# Patient Record
Sex: MALE | Race: OTHER | HISPANIC OR LATINO | Employment: OTHER | URBAN - METROPOLITAN AREA
[De-identification: names, ages, dates, MRNs, and addresses within clinical notes are randomized per-mention and may not be internally consistent; named-entity substitution may affect disease eponyms.]

---

## 2017-03-07 ENCOUNTER — ALLSCRIPTS OFFICE VISIT (OUTPATIENT)
Dept: OTHER | Facility: OTHER | Age: 42
End: 2017-03-07

## 2017-03-07 DIAGNOSIS — M54.50 LOW BACK PAIN: ICD-10-CM

## 2017-03-07 DIAGNOSIS — M54.2 CERVICALGIA: ICD-10-CM

## 2017-03-08 ENCOUNTER — APPOINTMENT (OUTPATIENT)
Dept: PHYSICAL THERAPY | Facility: CLINIC | Age: 42
End: 2017-03-08
Payer: COMMERCIAL

## 2017-03-08 PROCEDURE — 97110 THERAPEUTIC EXERCISES: CPT

## 2017-03-08 PROCEDURE — 97162 PT EVAL MOD COMPLEX 30 MIN: CPT

## 2017-03-09 ENCOUNTER — APPOINTMENT (OUTPATIENT)
Dept: PHYSICAL THERAPY | Facility: CLINIC | Age: 42
End: 2017-03-09
Payer: COMMERCIAL

## 2017-03-09 PROCEDURE — 97140 MANUAL THERAPY 1/> REGIONS: CPT

## 2017-03-09 PROCEDURE — 97110 THERAPEUTIC EXERCISES: CPT

## 2017-03-13 ENCOUNTER — APPOINTMENT (OUTPATIENT)
Dept: PHYSICAL THERAPY | Facility: CLINIC | Age: 42
End: 2017-03-13
Payer: COMMERCIAL

## 2017-03-13 PROCEDURE — 97140 MANUAL THERAPY 1/> REGIONS: CPT

## 2017-03-13 PROCEDURE — 97110 THERAPEUTIC EXERCISES: CPT

## 2017-03-14 ENCOUNTER — APPOINTMENT (OUTPATIENT)
Dept: PHYSICAL THERAPY | Facility: CLINIC | Age: 42
End: 2017-03-14
Payer: COMMERCIAL

## 2017-03-15 ENCOUNTER — APPOINTMENT (OUTPATIENT)
Dept: PHYSICAL THERAPY | Facility: CLINIC | Age: 42
End: 2017-03-15
Payer: COMMERCIAL

## 2017-03-16 ENCOUNTER — APPOINTMENT (OUTPATIENT)
Dept: PHYSICAL THERAPY | Facility: CLINIC | Age: 42
End: 2017-03-16
Payer: COMMERCIAL

## 2017-03-16 PROCEDURE — 97110 THERAPEUTIC EXERCISES: CPT

## 2017-03-20 ENCOUNTER — APPOINTMENT (OUTPATIENT)
Dept: PHYSICAL THERAPY | Facility: CLINIC | Age: 42
End: 2017-03-20
Payer: COMMERCIAL

## 2017-03-20 PROCEDURE — 97110 THERAPEUTIC EXERCISES: CPT

## 2017-03-20 PROCEDURE — 97140 MANUAL THERAPY 1/> REGIONS: CPT

## 2017-03-23 ENCOUNTER — APPOINTMENT (OUTPATIENT)
Dept: PHYSICAL THERAPY | Facility: CLINIC | Age: 42
End: 2017-03-23
Payer: COMMERCIAL

## 2017-03-23 PROCEDURE — 97110 THERAPEUTIC EXERCISES: CPT

## 2017-03-23 PROCEDURE — 97140 MANUAL THERAPY 1/> REGIONS: CPT

## 2017-03-27 ENCOUNTER — APPOINTMENT (OUTPATIENT)
Dept: PHYSICAL THERAPY | Facility: CLINIC | Age: 42
End: 2017-03-27
Payer: COMMERCIAL

## 2017-03-27 PROCEDURE — 97140 MANUAL THERAPY 1/> REGIONS: CPT

## 2017-03-27 PROCEDURE — 97110 THERAPEUTIC EXERCISES: CPT

## 2017-03-30 ENCOUNTER — APPOINTMENT (OUTPATIENT)
Dept: PHYSICAL THERAPY | Facility: CLINIC | Age: 42
End: 2017-03-30
Payer: COMMERCIAL

## 2017-03-30 PROCEDURE — 97110 THERAPEUTIC EXERCISES: CPT

## 2017-03-31 ENCOUNTER — ALLSCRIPTS OFFICE VISIT (OUTPATIENT)
Dept: OTHER | Facility: OTHER | Age: 42
End: 2017-03-31

## 2017-03-31 ENCOUNTER — TRANSCRIBE ORDERS (OUTPATIENT)
Dept: ADMINISTRATIVE | Facility: HOSPITAL | Age: 42
End: 2017-03-31

## 2017-03-31 DIAGNOSIS — M54.40 ACUTE RIGHT-SIDED LOW BACK PAIN WITH SCIATICA, SCIATICA LATERALITY UNSPECIFIED: ICD-10-CM

## 2017-03-31 DIAGNOSIS — M54.16 LUMBAR RADICULOPATHY: Primary | ICD-10-CM

## 2017-04-03 ENCOUNTER — GENERIC CONVERSION - ENCOUNTER (OUTPATIENT)
Dept: OTHER | Facility: OTHER | Age: 42
End: 2017-04-03

## 2017-04-04 ENCOUNTER — APPOINTMENT (OUTPATIENT)
Dept: PHYSICAL THERAPY | Facility: CLINIC | Age: 42
End: 2017-04-04
Payer: COMMERCIAL

## 2017-04-04 PROCEDURE — 97110 THERAPEUTIC EXERCISES: CPT

## 2017-04-05 ENCOUNTER — GENERIC CONVERSION - ENCOUNTER (OUTPATIENT)
Dept: OTHER | Facility: OTHER | Age: 42
End: 2017-04-05

## 2017-04-07 ENCOUNTER — APPOINTMENT (OUTPATIENT)
Dept: PHYSICAL THERAPY | Facility: CLINIC | Age: 42
End: 2017-04-07
Payer: COMMERCIAL

## 2017-04-07 PROCEDURE — 97110 THERAPEUTIC EXERCISES: CPT

## 2017-04-07 PROCEDURE — 97140 MANUAL THERAPY 1/> REGIONS: CPT

## 2017-04-10 ENCOUNTER — ALLSCRIPTS OFFICE VISIT (OUTPATIENT)
Dept: OTHER | Facility: OTHER | Age: 42
End: 2017-04-10

## 2017-04-12 ENCOUNTER — APPOINTMENT (OUTPATIENT)
Dept: PHYSICAL THERAPY | Facility: CLINIC | Age: 42
End: 2017-04-12
Payer: COMMERCIAL

## 2017-04-12 ENCOUNTER — GENERIC CONVERSION - ENCOUNTER (OUTPATIENT)
Dept: OTHER | Facility: OTHER | Age: 42
End: 2017-04-12

## 2017-04-12 PROCEDURE — 97140 MANUAL THERAPY 1/> REGIONS: CPT

## 2017-04-12 PROCEDURE — 97110 THERAPEUTIC EXERCISES: CPT

## 2017-04-13 ENCOUNTER — APPOINTMENT (OUTPATIENT)
Dept: PHYSICAL THERAPY | Facility: CLINIC | Age: 42
End: 2017-04-13
Payer: COMMERCIAL

## 2017-04-13 DIAGNOSIS — M54.50 LOW BACK PAIN: ICD-10-CM

## 2017-04-13 PROCEDURE — 97140 MANUAL THERAPY 1/> REGIONS: CPT

## 2017-04-13 PROCEDURE — 97110 THERAPEUTIC EXERCISES: CPT

## 2017-04-18 ENCOUNTER — APPOINTMENT (OUTPATIENT)
Dept: PHYSICAL THERAPY | Facility: CLINIC | Age: 42
End: 2017-04-18
Payer: COMMERCIAL

## 2017-04-18 PROCEDURE — 97110 THERAPEUTIC EXERCISES: CPT

## 2017-04-18 PROCEDURE — 97140 MANUAL THERAPY 1/> REGIONS: CPT

## 2017-04-20 ENCOUNTER — APPOINTMENT (OUTPATIENT)
Dept: PHYSICAL THERAPY | Facility: CLINIC | Age: 42
End: 2017-04-20
Payer: COMMERCIAL

## 2017-04-20 PROCEDURE — 97110 THERAPEUTIC EXERCISES: CPT

## 2017-04-20 PROCEDURE — 97140 MANUAL THERAPY 1/> REGIONS: CPT

## 2017-04-25 ENCOUNTER — APPOINTMENT (OUTPATIENT)
Dept: PHYSICAL THERAPY | Facility: CLINIC | Age: 42
End: 2017-04-25
Payer: COMMERCIAL

## 2017-04-25 PROCEDURE — 97110 THERAPEUTIC EXERCISES: CPT

## 2017-04-25 PROCEDURE — 97140 MANUAL THERAPY 1/> REGIONS: CPT

## 2017-04-27 ENCOUNTER — APPOINTMENT (OUTPATIENT)
Dept: PHYSICAL THERAPY | Facility: CLINIC | Age: 42
End: 2017-04-27
Payer: COMMERCIAL

## 2017-04-27 PROCEDURE — 97110 THERAPEUTIC EXERCISES: CPT

## 2017-04-30 ENCOUNTER — HOSPITAL ENCOUNTER (OUTPATIENT)
Dept: MRI IMAGING | Facility: HOSPITAL | Age: 42
Discharge: HOME | End: 2017-04-30
Payer: COMMERCIAL

## 2017-04-30 DIAGNOSIS — M54.50 LOW BACK PAIN: ICD-10-CM

## 2017-04-30 PROCEDURE — 72148 MRI LUMBAR SPINE W/O DYE: CPT

## 2017-05-01 ENCOUNTER — ANESTHESIA EVENT (OUTPATIENT)
Dept: GASTROENTEROLOGY | Facility: HOSPITAL | Age: 42
End: 2017-05-01
Payer: COMMERCIAL

## 2017-05-01 ENCOUNTER — GENERIC CONVERSION - ENCOUNTER (OUTPATIENT)
Dept: OTHER | Facility: OTHER | Age: 42
End: 2017-05-01

## 2017-05-01 RX ORDER — NAPROXEN 500 MG/1
500 TABLET ORAL 2 TIMES DAILY WITH MEALS
COMMUNITY
End: 2018-04-25 | Stop reason: ALTCHOICE

## 2017-05-01 RX ORDER — CYCLOBENZAPRINE HCL 10 MG
5 TABLET ORAL 3 TIMES DAILY PRN
COMMUNITY
End: 2018-03-05

## 2017-05-02 ENCOUNTER — GENERIC CONVERSION - ENCOUNTER (OUTPATIENT)
Dept: PERIOP | Facility: HOSPITAL | Age: 42
End: 2017-05-02

## 2017-05-02 ENCOUNTER — ANESTHESIA (OUTPATIENT)
Dept: GASTROENTEROLOGY | Facility: HOSPITAL | Age: 42
End: 2017-05-02
Payer: COMMERCIAL

## 2017-05-02 ENCOUNTER — HOSPITAL ENCOUNTER (OUTPATIENT)
Facility: HOSPITAL | Age: 42
Setting detail: OUTPATIENT SURGERY
Discharge: HOME | End: 2017-05-02
Attending: SURGERY | Admitting: SURGERY
Payer: COMMERCIAL

## 2017-05-02 VITALS
SYSTOLIC BLOOD PRESSURE: 107 MMHG | WEIGHT: 135 LBS | HEIGHT: 69 IN | DIASTOLIC BLOOD PRESSURE: 67 MMHG | TEMPERATURE: 97.8 F | BODY MASS INDEX: 19.99 KG/M2 | HEART RATE: 52 BPM | RESPIRATION RATE: 20 BRPM | OXYGEN SATURATION: 100 %

## 2017-05-02 DIAGNOSIS — K62.5 HEMORRHAGE OF ANUS AND RECTUM: ICD-10-CM

## 2017-05-02 PROCEDURE — 88305 TISSUE EXAM BY PATHOLOGIST: CPT | Performed by: SURGERY

## 2017-05-02 RX ORDER — SODIUM CHLORIDE 9 MG/ML
125 INJECTION, SOLUTION INTRAVENOUS CONTINUOUS
Status: DISCONTINUED | OUTPATIENT
Start: 2017-05-02 | End: 2017-05-02 | Stop reason: HOSPADM

## 2017-05-02 RX ORDER — PROPOFOL 10 MG/ML
INJECTION, EMULSION INTRAVENOUS AS NEEDED
Status: DISCONTINUED | OUTPATIENT
Start: 2017-05-02 | End: 2017-05-02 | Stop reason: SURG

## 2017-05-02 RX ORDER — ONDANSETRON 2 MG/ML
4 INJECTION INTRAMUSCULAR; INTRAVENOUS EVERY 6 HOURS PRN
Status: DISCONTINUED | OUTPATIENT
Start: 2017-05-02 | End: 2017-05-02 | Stop reason: HOSPADM

## 2017-05-02 RX ORDER — LIDOCAINE HYDROCHLORIDE 10 MG/ML
INJECTION, SOLUTION INFILTRATION; PERINEURAL AS NEEDED
Status: DISCONTINUED | OUTPATIENT
Start: 2017-05-02 | End: 2017-05-02 | Stop reason: SURG

## 2017-05-02 RX ADMIN — PROPOFOL 50 MG: 10 INJECTION, EMULSION INTRAVENOUS at 10:25

## 2017-05-02 RX ADMIN — PROPOFOL 120 MG: 10 INJECTION, EMULSION INTRAVENOUS at 10:22

## 2017-05-02 RX ADMIN — PROPOFOL 30 MG: 10 INJECTION, EMULSION INTRAVENOUS at 10:27

## 2017-05-02 RX ADMIN — PROPOFOL 50 MG: 10 INJECTION, EMULSION INTRAVENOUS at 10:32

## 2017-05-02 RX ADMIN — PROPOFOL 50 MG: 10 INJECTION, EMULSION INTRAVENOUS at 10:30

## 2017-05-02 RX ADMIN — SODIUM CHLORIDE 125 ML/HR: 0.9 INJECTION, SOLUTION INTRAVENOUS at 09:53

## 2017-05-02 RX ADMIN — LIDOCAINE HYDROCHLORIDE 25 MG: 10 INJECTION, SOLUTION INFILTRATION; PERINEURAL at 10:22

## 2017-05-03 ENCOUNTER — APPOINTMENT (OUTPATIENT)
Dept: PHYSICAL THERAPY | Facility: CLINIC | Age: 42
End: 2017-05-03
Payer: COMMERCIAL

## 2017-05-03 PROCEDURE — 97110 THERAPEUTIC EXERCISES: CPT

## 2017-05-05 ENCOUNTER — GENERIC CONVERSION - ENCOUNTER (OUTPATIENT)
Dept: OTHER | Facility: OTHER | Age: 42
End: 2017-05-05

## 2017-05-05 ENCOUNTER — APPOINTMENT (OUTPATIENT)
Dept: PHYSICAL THERAPY | Facility: CLINIC | Age: 42
End: 2017-05-05
Payer: COMMERCIAL

## 2017-05-05 PROCEDURE — 97164 PT RE-EVAL EST PLAN CARE: CPT

## 2017-05-05 PROCEDURE — 97110 THERAPEUTIC EXERCISES: CPT

## 2017-05-08 ENCOUNTER — APPOINTMENT (OUTPATIENT)
Dept: PHYSICAL THERAPY | Facility: CLINIC | Age: 42
End: 2017-05-08
Payer: COMMERCIAL

## 2017-05-08 PROCEDURE — 97110 THERAPEUTIC EXERCISES: CPT

## 2017-05-08 PROCEDURE — 97140 MANUAL THERAPY 1/> REGIONS: CPT

## 2017-05-11 ENCOUNTER — APPOINTMENT (OUTPATIENT)
Dept: PHYSICAL THERAPY | Facility: CLINIC | Age: 42
End: 2017-05-11
Payer: COMMERCIAL

## 2017-05-11 PROCEDURE — 97110 THERAPEUTIC EXERCISES: CPT

## 2017-05-11 PROCEDURE — 97140 MANUAL THERAPY 1/> REGIONS: CPT

## 2017-05-15 ENCOUNTER — APPOINTMENT (OUTPATIENT)
Dept: PHYSICAL THERAPY | Facility: CLINIC | Age: 42
End: 2017-05-15
Payer: COMMERCIAL

## 2017-05-18 ENCOUNTER — APPOINTMENT (OUTPATIENT)
Dept: PHYSICAL THERAPY | Facility: CLINIC | Age: 42
End: 2017-05-18
Payer: COMMERCIAL

## 2017-05-18 PROCEDURE — 97140 MANUAL THERAPY 1/> REGIONS: CPT

## 2017-05-18 PROCEDURE — 97110 THERAPEUTIC EXERCISES: CPT

## 2017-05-22 ENCOUNTER — APPOINTMENT (OUTPATIENT)
Dept: PHYSICAL THERAPY | Facility: CLINIC | Age: 42
End: 2017-05-22
Payer: COMMERCIAL

## 2017-05-22 PROCEDURE — 97110 THERAPEUTIC EXERCISES: CPT

## 2017-05-25 ENCOUNTER — APPOINTMENT (OUTPATIENT)
Dept: PHYSICAL THERAPY | Facility: CLINIC | Age: 42
End: 2017-05-25
Payer: COMMERCIAL

## 2017-05-25 PROCEDURE — 97110 THERAPEUTIC EXERCISES: CPT

## 2017-05-25 PROCEDURE — 97140 MANUAL THERAPY 1/> REGIONS: CPT

## 2017-05-30 ENCOUNTER — GENERIC CONVERSION - ENCOUNTER (OUTPATIENT)
Dept: OTHER | Facility: OTHER | Age: 42
End: 2017-05-30

## 2017-05-30 ENCOUNTER — APPOINTMENT (OUTPATIENT)
Dept: PHYSICAL THERAPY | Facility: CLINIC | Age: 42
End: 2017-05-30
Payer: COMMERCIAL

## 2017-05-30 PROCEDURE — 97140 MANUAL THERAPY 1/> REGIONS: CPT

## 2017-05-30 PROCEDURE — 97110 THERAPEUTIC EXERCISES: CPT

## 2017-06-01 ENCOUNTER — ALLSCRIPTS OFFICE VISIT (OUTPATIENT)
Dept: OTHER | Facility: OTHER | Age: 42
End: 2017-06-01

## 2017-06-01 DIAGNOSIS — M54.2 CERVICALGIA: ICD-10-CM

## 2017-06-05 ENCOUNTER — TRANSCRIBE ORDERS (OUTPATIENT)
Dept: ADMINISTRATIVE | Facility: HOSPITAL | Age: 42
End: 2017-06-05

## 2017-06-05 DIAGNOSIS — M54.2 CERVICAL PAIN: Primary | ICD-10-CM

## 2017-06-09 ENCOUNTER — HOSPITAL ENCOUNTER (OUTPATIENT)
Dept: MRI IMAGING | Facility: HOSPITAL | Age: 42
Discharge: HOME | End: 2017-06-09
Payer: COMMERCIAL

## 2017-06-09 DIAGNOSIS — M54.2 CERVICAL PAIN: ICD-10-CM

## 2017-06-09 PROCEDURE — 72141 MRI NECK SPINE W/O DYE: CPT

## 2017-06-12 ENCOUNTER — GENERIC CONVERSION - ENCOUNTER (OUTPATIENT)
Dept: OTHER | Facility: OTHER | Age: 42
End: 2017-06-12

## 2017-07-14 ENCOUNTER — ALLSCRIPTS OFFICE VISIT (OUTPATIENT)
Dept: OTHER | Facility: OTHER | Age: 42
End: 2017-07-14

## 2017-07-19 ENCOUNTER — ALLSCRIPTS OFFICE VISIT (OUTPATIENT)
Dept: RADIOLOGY | Facility: MEDICAL CENTER | Age: 42
End: 2017-07-19
Attending: DENTIST
Payer: COMMERCIAL

## 2017-07-25 ENCOUNTER — ALLSCRIPTS OFFICE VISIT (OUTPATIENT)
Dept: OTHER | Facility: OTHER | Age: 42
End: 2017-07-25

## 2017-07-31 ENCOUNTER — GENERIC CONVERSION - ENCOUNTER (OUTPATIENT)
Dept: OTHER | Facility: OTHER | Age: 42
End: 2017-07-31

## 2017-09-11 DIAGNOSIS — R93.7 ABNORMAL FINDINGS ON DIAGNOSTIC IMAGING OF OTHER PARTS OF MUSCULOSKELETAL SYSTEM: ICD-10-CM

## 2017-09-18 ENCOUNTER — GENERIC CONVERSION - ENCOUNTER (OUTPATIENT)
Dept: OTHER | Facility: OTHER | Age: 42
End: 2017-09-18

## 2017-09-18 DIAGNOSIS — M54.2 CERVICALGIA: ICD-10-CM

## 2017-09-18 DIAGNOSIS — M47.812 SPONDYLOSIS OF CERVICAL REGION WITHOUT MYELOPATHY OR RADICULOPATHY: ICD-10-CM

## 2017-09-20 ENCOUNTER — GENERIC CONVERSION - ENCOUNTER (OUTPATIENT)
Dept: OTHER | Facility: OTHER | Age: 42
End: 2017-09-20

## 2017-09-28 ENCOUNTER — APPOINTMENT (OUTPATIENT)
Dept: PHYSICAL THERAPY | Facility: CLINIC | Age: 42
End: 2017-09-28
Payer: COMMERCIAL

## 2017-09-28 ENCOUNTER — GENERIC CONVERSION - ENCOUNTER (OUTPATIENT)
Dept: OTHER | Facility: OTHER | Age: 42
End: 2017-09-28

## 2017-09-28 DIAGNOSIS — M47.812 SPONDYLOSIS OF CERVICAL REGION WITHOUT MYELOPATHY OR RADICULOPATHY: ICD-10-CM

## 2017-09-28 DIAGNOSIS — M54.2 CERVICALGIA: ICD-10-CM

## 2017-09-28 PROCEDURE — G8991 OTHER PT/OT GOAL STATUS: HCPCS

## 2017-09-28 PROCEDURE — G8990 OTHER PT/OT CURRENT STATUS: HCPCS

## 2017-09-28 PROCEDURE — 97162 PT EVAL MOD COMPLEX 30 MIN: CPT

## 2017-10-03 ENCOUNTER — APPOINTMENT (OUTPATIENT)
Dept: PHYSICAL THERAPY | Facility: CLINIC | Age: 42
End: 2017-10-03
Payer: COMMERCIAL

## 2017-10-03 PROCEDURE — 97140 MANUAL THERAPY 1/> REGIONS: CPT

## 2017-10-03 PROCEDURE — 97110 THERAPEUTIC EXERCISES: CPT

## 2017-10-05 ENCOUNTER — APPOINTMENT (OUTPATIENT)
Dept: PHYSICAL THERAPY | Facility: CLINIC | Age: 42
End: 2017-10-05
Payer: COMMERCIAL

## 2017-10-05 PROCEDURE — 97110 THERAPEUTIC EXERCISES: CPT

## 2017-10-05 PROCEDURE — 97140 MANUAL THERAPY 1/> REGIONS: CPT

## 2017-10-06 ENCOUNTER — GENERIC CONVERSION - ENCOUNTER (OUTPATIENT)
Dept: OTHER | Facility: OTHER | Age: 42
End: 2017-10-06

## 2017-10-10 ENCOUNTER — APPOINTMENT (OUTPATIENT)
Dept: PHYSICAL THERAPY | Facility: CLINIC | Age: 42
End: 2017-10-10
Payer: COMMERCIAL

## 2017-10-10 PROCEDURE — 97140 MANUAL THERAPY 1/> REGIONS: CPT

## 2017-10-10 PROCEDURE — 97110 THERAPEUTIC EXERCISES: CPT

## 2017-10-12 ENCOUNTER — APPOINTMENT (OUTPATIENT)
Dept: PHYSICAL THERAPY | Facility: CLINIC | Age: 42
End: 2017-10-12
Payer: COMMERCIAL

## 2017-10-12 PROCEDURE — 97112 NEUROMUSCULAR REEDUCATION: CPT

## 2017-10-12 PROCEDURE — 97140 MANUAL THERAPY 1/> REGIONS: CPT

## 2017-10-12 PROCEDURE — 97110 THERAPEUTIC EXERCISES: CPT

## 2017-10-17 ENCOUNTER — APPOINTMENT (OUTPATIENT)
Dept: PHYSICAL THERAPY | Facility: CLINIC | Age: 42
End: 2017-10-17
Payer: COMMERCIAL

## 2017-10-19 ENCOUNTER — APPOINTMENT (OUTPATIENT)
Dept: PHYSICAL THERAPY | Facility: CLINIC | Age: 42
End: 2017-10-19
Payer: COMMERCIAL

## 2017-10-19 PROCEDURE — 97140 MANUAL THERAPY 1/> REGIONS: CPT

## 2017-10-19 PROCEDURE — 97110 THERAPEUTIC EXERCISES: CPT

## 2017-10-24 ENCOUNTER — APPOINTMENT (OUTPATIENT)
Dept: PHYSICAL THERAPY | Facility: CLINIC | Age: 42
End: 2017-10-24
Payer: COMMERCIAL

## 2017-10-24 PROCEDURE — 97110 THERAPEUTIC EXERCISES: CPT

## 2017-10-24 PROCEDURE — 97140 MANUAL THERAPY 1/> REGIONS: CPT

## 2017-10-26 ENCOUNTER — APPOINTMENT (OUTPATIENT)
Dept: PHYSICAL THERAPY | Facility: CLINIC | Age: 42
End: 2017-10-26
Payer: COMMERCIAL

## 2017-10-26 PROCEDURE — 97140 MANUAL THERAPY 1/> REGIONS: CPT

## 2017-10-26 PROCEDURE — 97110 THERAPEUTIC EXERCISES: CPT

## 2017-10-30 ENCOUNTER — GENERIC CONVERSION - ENCOUNTER (OUTPATIENT)
Dept: FAMILY MEDICINE CLINIC | Facility: CLINIC | Age: 42
End: 2017-10-30

## 2017-10-31 ENCOUNTER — GENERIC CONVERSION - ENCOUNTER (OUTPATIENT)
Dept: OTHER | Facility: OTHER | Age: 42
End: 2017-10-31

## 2017-10-31 ENCOUNTER — APPOINTMENT (OUTPATIENT)
Dept: PHYSICAL THERAPY | Facility: CLINIC | Age: 42
End: 2017-10-31
Payer: COMMERCIAL

## 2017-10-31 PROCEDURE — G8991 OTHER PT/OT GOAL STATUS: HCPCS

## 2017-10-31 PROCEDURE — G8990 OTHER PT/OT CURRENT STATUS: HCPCS

## 2017-10-31 PROCEDURE — 97140 MANUAL THERAPY 1/> REGIONS: CPT

## 2017-11-02 ENCOUNTER — APPOINTMENT (OUTPATIENT)
Dept: PHYSICAL THERAPY | Facility: CLINIC | Age: 42
End: 2017-11-02
Payer: COMMERCIAL

## 2017-11-02 PROCEDURE — 97110 THERAPEUTIC EXERCISES: CPT

## 2017-11-02 PROCEDURE — 97140 MANUAL THERAPY 1/> REGIONS: CPT

## 2017-11-02 PROCEDURE — 97530 THERAPEUTIC ACTIVITIES: CPT

## 2017-11-07 ENCOUNTER — APPOINTMENT (OUTPATIENT)
Dept: PHYSICAL THERAPY | Facility: CLINIC | Age: 42
End: 2017-11-07
Payer: COMMERCIAL

## 2017-11-07 PROCEDURE — 97140 MANUAL THERAPY 1/> REGIONS: CPT

## 2017-11-07 PROCEDURE — 97530 THERAPEUTIC ACTIVITIES: CPT

## 2017-11-07 PROCEDURE — 97110 THERAPEUTIC EXERCISES: CPT

## 2017-11-09 ENCOUNTER — APPOINTMENT (OUTPATIENT)
Dept: PHYSICAL THERAPY | Facility: CLINIC | Age: 42
End: 2017-11-09
Payer: COMMERCIAL

## 2017-11-09 PROCEDURE — 97110 THERAPEUTIC EXERCISES: CPT

## 2017-11-09 PROCEDURE — 97530 THERAPEUTIC ACTIVITIES: CPT

## 2017-11-09 PROCEDURE — 97140 MANUAL THERAPY 1/> REGIONS: CPT

## 2017-11-13 ENCOUNTER — ALLSCRIPTS OFFICE VISIT (OUTPATIENT)
Dept: OTHER | Facility: OTHER | Age: 42
End: 2017-11-13

## 2017-11-14 ENCOUNTER — APPOINTMENT (OUTPATIENT)
Dept: PHYSICAL THERAPY | Facility: CLINIC | Age: 42
End: 2017-11-14
Payer: COMMERCIAL

## 2017-11-14 PROCEDURE — 97140 MANUAL THERAPY 1/> REGIONS: CPT

## 2017-11-14 PROCEDURE — 97110 THERAPEUTIC EXERCISES: CPT

## 2017-11-14 PROCEDURE — 97530 THERAPEUTIC ACTIVITIES: CPT

## 2017-11-14 NOTE — PROGRESS NOTES
Assessment  1  Cervical myofascial pain syndrome (729 1) (M79 1)   2  Cervical spondylosis (721 0) (M47 812)   3  Chronic neck pain (723 1,338 29) (M54 2,G89 29)    Plan   Cervical myofascial pain syndrome, Myofascial pain syndrome    · Chlorzoxazone 500 MG Oral Tablet; TAKE 1 5 TABLET 3 TIMES AS NEEDED   Rx By: Colton Gualpla; Dispense: 30 Days ; #:90 Tablet; Refill: 1;Cervical myofascial pain syndrome, Myofascial pain syndrome; JAE = N; Verified Transmission to [facility] [address]; Last Updated By: System, SureScript[employer]; 11/13/2017 10:50:34 AM  Follow-up visit in 2 months Evaluation and Treatment  Follow-up with [facility] for med refills  Status: Hold For - Scheduling  Requested for: [id number] Ordered; For: Cervical myofascial pain syndrome;  Ordered By: Colton Guallpa  Performed:   Due: [id number]   Discussion/Summary    The patient presents today for a follow-up office visit  The patient is currently being treated for cervical spondylosis, chronic left-sided neck pain  The patient has been participating in physical therapy which she feels is helpful while he is there and for short time after, however his pain ends up returning  He does use an at home TENS unit  He has also been using cyclobenzaprine  Patient tells me that he has a neurology appointment scheduled for December 13th at [facility]   this time, patient should continue with his physical therapy program and at home TENS unit   will switch the patient's muscle relaxer to chlorzoxazone 1 5 tablets 3 times per day  will provide the patient with samples of Pennsaid ointment since he was not able to obtain diclofenac gel after his last appointment  If he feels this medication is helpful he will call the office  PDMP was reviewed today and was appropriatewill see the patient back in 8 weeks  Patient is able to Self-Care  The treatment plan was reviewed with the patient/guardian   The patient/guardian understands and agrees with the treatment plan      Chief Complaint    1  Neck Pain  Left-sided neck and trapezius pain      History of Present Illness  The patient presents today for a follow-up office visit  The patient is currently being treated for cervical spondylosis, chronic left-sided neck pain  The patient has been participating in physical therapy which she feels is helpful while he is there and for short time after, however his pain ends up returning  He does use an at home TENS unit  He has also been using cyclobenzaprine  Patient tells me that he has a neurology appointment scheduled for December 13th at [facility]   the patient rates his pain 4/10, this is constant in nature most bothersome in the morning and at night  He describes his pain as burning, sharp, and pressure-like  He localizes his pain to the neck on the left side in into the left arm   have personally reviewed and/or updated the patient's past medical history, past surgical history, family history, social history, current medications, allergies, and vital signs today  Rachel Jesus presents with complaints of constant episodes of bilateral posterior neck pain, described as sharp and burning, radiating to the left shoulder  On a scale of 1 to 10, the patient rates the pain as 4  Symptoms are unchanged  Review of Systems   Constitutional: no fever,-- no recent weight gain-- and-- no recent weight loss  Eyes: no double vision-- and-- no blurry vision  Cardiovascular: no chest pain,-- no palpitations-- and-- no lower extremity edema  Respiratory: no complaints of shortness of breath-- and-- no wheezing  Musculoskeletal: muscle weakness,-- joint stiffness-- and-- decreased range of motion, but-- no difficulty walking,-- no joint swelling,-- no limb swelling-- and-- no pain in extremity  Neurological: dizziness, but-- no difficulty swallowing,-- no memory loss,-- no loss of consciousness-- and-- no seizures    Gastrointestinal: no nausea,-- no vomiting,-- no constipation-- and-- no diarrhea  Genitourinary: no difficulty initiating urine stream,-- no genital pain-- and-- no frequent urination  Integumentary: no complaints of skin rash  Psychiatric: no depression  Endocrine: no excessive thirst,-- no adrenal disease,-- no hypothyroidism-- and-- no hyperthyroidism  Hematologic/Lymphatic: no tendency for easy bruising-- and-- no tendency for easy bleeding  Active Problems  1  Abnormal magnetic resonance imaging of cervical spine (793 7) (R93 7)   2  Cervical spondylosis (721 0) (M47 812)   3  Chronic low back pain (724 2,338 29) (M54 5,G89 29)   4  Chronic neck pain (723 1,338 29) (M54 2,G89 29)   5  Depression screening (V79 0) (Z13 89)   6  Diverticulosis (562 10) (K57 90)   7  Hemorrhoids (455 6) (K64 9)   8  History of colonic polyps (V12 72) (Z86 010)   9  Neck pain on left side (723 1) (M54 2)   10  Need for influenza vaccination (V04 81) (Z23)   11  Paresthesias (782 0) (R20 2)   12  History of Rectal bleeding (569 3) (K62 5)   13  Right lumbar radiculopathy (724 4) (M54 16)    Past Medical History  1  History of depression (V11 8) (Z86 59)   2  History of Rectal bleeding (569 3) (K62 5)    Surgical History  1  History of Knee Arthroscopy (Therapeutic)    Family History  Mother    1  Family history of diabetes mellitus (V18 0) (Z83 3)   2  Family history of hypertension (V17 49) (Z82 49)   3  Family history of lung cancer (V16 1) (Z80 1)  Father    4  No pertinent family history    Social History   · Currently unemployed and looking for work (V62 0) (Z56 0)   · History of drug use (305 93) (Z87 898)   · Never a smoker   · No alcohol use   · No preference on Denominational beliefs   · Single, in a monogamous relationship   · Uses marijuana (305 20) (F12 90)    Allergies  1   No Known Drug Allergies    Vitals  Vital Signs    Recorded: 54RBH0597 15:26VG   Systolic 015   Diastolic 70   Weight 218 lb    BMI Calculated 19 64   BSA Calculated 1 74   Pain Scale 4 Physical Exam   Constitutional  General appearance: Well developed, well nourished, alert, in no distress, non-toxic and no overt pain behavior  Eyes  Sclera: anicteric  HEENT  Hearing grossly intact  Pulmonary  Respiratory effort: Even and unlabored  Psychiatric  Mood and affect: Mood and affect appropriate  Neurologic  Cranial nerves: Cranial nerves II-XII grossly intact  Musculoskeletal  Gait and station: Normal    Cervical Spine examination demonstrates Cervical Spine:  Appearance: Forward head carry  Tenderness: left paraspinal tenderness-- and-- left trapezius muscle  Flexion was painful  Extension was painful  Left lateral flexion was painful  Right lateral flexion was painful  Rotation to the left was painful  Rotation to the right was painful   hand strength was normal bilaterally  wrist strength was normal bilaterally  elbow strength was normal bilaterally  shoulder strength was normal bilaterally  Results/Data  * MRI CERVICAL SPINE WO CONTRAST [id number] 10:27PM Tia Goode     Test Name Result Flag Reference   [address]INE [address] (Report)       MRI CERVICAL SPINE WITHOUT CONTRAST   INDICATION: 45-year-old male, posterior lower neck pain  COMPARISON: None  TECHNIQUE: Sagittal T1, sagittal T2, sagittal inversion recovery, axial T2, axial 2D merge      IMAGE QUALITY: Diagnostic   FINDINGS:   ALIGNMENT: Normal alignment of the cervical spine  No compression fracture  No subluxation  No scoliosis  MARROW SIGNAL: Normal marrow signal is identified within the visualized bony structures  No discrete marrow lesion  CERVICAL AND VISUALIZED THORACIC CORD: Minimally prominent central canal of the spinal cord extends from approximately C3-C7, likely developmental  The degree of prominence is not sufficient to be considered typical syrinx   3 month MRI follow-up   recommended performed without and with IV contrast    PREVERTEBRAL AND PARASPINAL SOFT TISSUES: Prevertebral and paraspinal soft tissues are unremarkable     VISUALIZED POSTERIOR FOSSA: The visualized posterior fossa demonstrates no abnormal signal    CERVICAL DISC SPACES:      C2-C3: Normal    C3-C4: Normal    C4-C5: Minimal degenerative disc and facet disease, no stenosis   C5-C6: Minimal degenerative disc and facet disease, no stenosis   C6-C7: Minimal degenerative disc and facet disease, no stenosis   C7-T1: Normal    UPPER THORACIC DISC SPACES: Normal     IMPRESSION:  Minimal multilevel degenerative spondylosis   No evidence of disc herniation, central canal or foraminal stenosis   Mild multilevel prominence of the central canal of the cervical spinal cord, likely developmental  MRI follow-up recommended as discussed above    ##sigslh##sigslh    Workstation performed: [id number]   Signed by:  Jimi Caballero MD  6/12/17     Future Appointments    Date/Time Provider Specialty Site   01/08/2018 11:15 AM [facility] 15       Signatures   Electronically signed by : Roseann Leon; Nov 13 2017 10:53AM EST                       (Author)    Electronically signed by : Sean Escalante DO; Nov 13 2017 12:52PM EST

## 2017-11-16 ENCOUNTER — APPOINTMENT (OUTPATIENT)
Dept: PHYSICAL THERAPY | Facility: CLINIC | Age: 42
End: 2017-11-16
Payer: COMMERCIAL

## 2017-11-21 ENCOUNTER — APPOINTMENT (OUTPATIENT)
Dept: PHYSICAL THERAPY | Facility: CLINIC | Age: 42
End: 2017-11-21
Payer: COMMERCIAL

## 2017-11-21 PROCEDURE — 97140 MANUAL THERAPY 1/> REGIONS: CPT

## 2017-11-21 PROCEDURE — 97110 THERAPEUTIC EXERCISES: CPT

## 2017-11-24 ENCOUNTER — APPOINTMENT (OUTPATIENT)
Dept: PHYSICAL THERAPY | Facility: CLINIC | Age: 42
End: 2017-11-24
Payer: COMMERCIAL

## 2017-11-24 PROCEDURE — 97110 THERAPEUTIC EXERCISES: CPT

## 2017-11-24 PROCEDURE — 97530 THERAPEUTIC ACTIVITIES: CPT

## 2017-11-24 PROCEDURE — 97140 MANUAL THERAPY 1/> REGIONS: CPT

## 2017-11-28 ENCOUNTER — APPOINTMENT (OUTPATIENT)
Dept: PHYSICAL THERAPY | Facility: CLINIC | Age: 42
End: 2017-11-28
Payer: COMMERCIAL

## 2017-11-28 ENCOUNTER — GENERIC CONVERSION - ENCOUNTER (OUTPATIENT)
Dept: OTHER | Facility: OTHER | Age: 42
End: 2017-11-28

## 2017-11-28 PROCEDURE — 97110 THERAPEUTIC EXERCISES: CPT

## 2017-11-28 PROCEDURE — 97140 MANUAL THERAPY 1/> REGIONS: CPT

## 2017-11-28 PROCEDURE — G8990 OTHER PT/OT CURRENT STATUS: HCPCS

## 2017-11-28 PROCEDURE — G8991 OTHER PT/OT GOAL STATUS: HCPCS

## 2017-11-30 ENCOUNTER — APPOINTMENT (OUTPATIENT)
Dept: PHYSICAL THERAPY | Facility: CLINIC | Age: 42
End: 2017-11-30
Payer: COMMERCIAL

## 2017-11-30 PROCEDURE — 97530 THERAPEUTIC ACTIVITIES: CPT

## 2017-11-30 PROCEDURE — 97140 MANUAL THERAPY 1/> REGIONS: CPT

## 2017-11-30 PROCEDURE — 97110 THERAPEUTIC EXERCISES: CPT

## 2017-12-04 ENCOUNTER — GENERIC CONVERSION - ENCOUNTER (OUTPATIENT)
Dept: OTHER | Facility: OTHER | Age: 42
End: 2017-12-04

## 2017-12-04 DIAGNOSIS — Z13.220 ENCOUNTER FOR SCREENING FOR LIPOID DISORDERS: ICD-10-CM

## 2017-12-04 DIAGNOSIS — R35.0 FREQUENCY OF MICTURITION: ICD-10-CM

## 2017-12-04 DIAGNOSIS — R63.1 POLYDIPSIA: ICD-10-CM

## 2017-12-04 DIAGNOSIS — R10.9 ABDOMINAL PAIN: ICD-10-CM

## 2017-12-04 DIAGNOSIS — R31.9 HEMATURIA: ICD-10-CM

## 2017-12-04 DIAGNOSIS — R35.8 OTHER POLYURIA (CODE): ICD-10-CM

## 2017-12-05 ENCOUNTER — APPOINTMENT (OUTPATIENT)
Dept: LAB | Facility: HOSPITAL | Age: 42
End: 2017-12-05
Payer: COMMERCIAL

## 2017-12-05 ENCOUNTER — GENERIC CONVERSION - ENCOUNTER (OUTPATIENT)
Dept: OTHER | Facility: OTHER | Age: 42
End: 2017-12-05

## 2017-12-05 DIAGNOSIS — R10.9 ABDOMINAL PAIN: ICD-10-CM

## 2017-12-05 DIAGNOSIS — Z13.220 ENCOUNTER FOR SCREENING FOR LIPOID DISORDERS: ICD-10-CM

## 2017-12-05 DIAGNOSIS — R63.1 POLYDIPSIA: ICD-10-CM

## 2017-12-05 DIAGNOSIS — R35.8 OTHER POLYURIA (CODE): ICD-10-CM

## 2017-12-05 DIAGNOSIS — R35.0 FREQUENCY OF MICTURITION: ICD-10-CM

## 2017-12-05 LAB
ALBUMIN SERPL BCP-MCNC: 4.1 G/DL (ref 3.5–5)
ALP SERPL-CCNC: 50 U/L (ref 46–116)
ALT SERPL W P-5'-P-CCNC: 17 U/L (ref 12–78)
ANION GAP SERPL CALCULATED.3IONS-SCNC: 6 MMOL/L (ref 4–13)
AST SERPL W P-5'-P-CCNC: 13 U/L (ref 5–45)
BACTERIA UR QL AUTO: ABNORMAL /HPF
BASOPHILS # BLD AUTO: 0.06 THOUSANDS/ΜL (ref 0–0.1)
BASOPHILS NFR BLD AUTO: 1 % (ref 0–1)
BILIRUB SERPL-MCNC: 0.4 MG/DL (ref 0.2–1)
BILIRUB UR QL STRIP: NEGATIVE
BUN SERPL-MCNC: 9 MG/DL (ref 5–25)
CALCIUM SERPL-MCNC: 9.4 MG/DL (ref 8.3–10.1)
CHLORIDE SERPL-SCNC: 103 MMOL/L (ref 100–108)
CHOLEST SERPL-MCNC: 177 MG/DL (ref 50–200)
CLARITY UR: CLEAR
CO2 SERPL-SCNC: 29 MMOL/L (ref 21–32)
COLOR UR: YELLOW
CREAT SERPL-MCNC: 0.81 MG/DL (ref 0.6–1.3)
EOSINOPHIL # BLD AUTO: 0.33 THOUSAND/ΜL (ref 0–0.61)
EOSINOPHIL NFR BLD AUTO: 3 % (ref 0–6)
ERYTHROCYTE [DISTWIDTH] IN BLOOD BY AUTOMATED COUNT: 14.4 % (ref 11.6–15.1)
EST. AVERAGE GLUCOSE BLD GHB EST-MCNC: 103 MG/DL
GFR SERPL CREATININE-BSD FRML MDRD: 110 ML/MIN/1.73SQ M
GLUCOSE P FAST SERPL-MCNC: 103 MG/DL (ref 65–99)
GLUCOSE UR STRIP-MCNC: NEGATIVE MG/DL
HBA1C MFR BLD: 5.2 % (ref 4.2–6.3)
HCT VFR BLD AUTO: 43.5 % (ref 36.5–49.3)
HDLC SERPL-MCNC: 49 MG/DL (ref 40–60)
HGB BLD-MCNC: 14.8 G/DL (ref 12–17)
HGB UR QL STRIP.AUTO: ABNORMAL
KETONES UR STRIP-MCNC: NEGATIVE MG/DL
LDLC SERPL CALC-MCNC: 105 MG/DL (ref 0–100)
LEUKOCYTE ESTERASE UR QL STRIP: NEGATIVE
LYMPHOCYTES # BLD AUTO: 4.68 THOUSANDS/ΜL (ref 0.6–4.47)
LYMPHOCYTES NFR BLD AUTO: 40 % (ref 14–44)
MCH RBC QN AUTO: 30.8 PG (ref 26.8–34.3)
MCHC RBC AUTO-ENTMCNC: 34 G/DL (ref 31.4–37.4)
MCV RBC AUTO: 91 FL (ref 82–98)
MONOCYTES # BLD AUTO: 0.76 THOUSAND/ΜL (ref 0.17–1.22)
MONOCYTES NFR BLD AUTO: 7 % (ref 4–12)
NEUTROPHILS # BLD AUTO: 5.81 THOUSANDS/ΜL (ref 1.85–7.62)
NEUTS SEG NFR BLD AUTO: 49 % (ref 43–75)
NITRITE UR QL STRIP: NEGATIVE
NON-SQ EPI CELLS URNS QL MICRO: ABNORMAL /HPF
NRBC BLD AUTO-RTO: 0 /100 WBCS
PH UR STRIP.AUTO: 6 [PH] (ref 4.5–8)
PLATELET # BLD AUTO: 274 THOUSANDS/UL (ref 149–390)
PMV BLD AUTO: 10.9 FL (ref 8.9–12.7)
POTASSIUM SERPL-SCNC: 3.9 MMOL/L (ref 3.5–5.3)
PROT SERPL-MCNC: 7.3 G/DL (ref 6.4–8.2)
PROT UR STRIP-MCNC: NEGATIVE MG/DL
RBC # BLD AUTO: 4.8 MILLION/UL (ref 3.88–5.62)
RBC #/AREA URNS AUTO: ABNORMAL /HPF
SODIUM SERPL-SCNC: 138 MMOL/L (ref 136–145)
SP GR UR STRIP.AUTO: <=1.005 (ref 1–1.03)
TRIGL SERPL-MCNC: 113 MG/DL
TSH SERPL DL<=0.05 MIU/L-ACNC: 1.01 UIU/ML (ref 0.36–3.74)
UROBILINOGEN UR QL STRIP.AUTO: 0.2 E.U./DL
WBC # BLD AUTO: 11.64 THOUSAND/UL (ref 4.31–10.16)
WBC #/AREA URNS AUTO: ABNORMAL /HPF

## 2017-12-05 PROCEDURE — 80053 COMPREHEN METABOLIC PANEL: CPT

## 2017-12-05 PROCEDURE — 36415 COLL VENOUS BLD VENIPUNCTURE: CPT

## 2017-12-05 PROCEDURE — 80061 LIPID PANEL: CPT

## 2017-12-05 PROCEDURE — 81001 URINALYSIS AUTO W/SCOPE: CPT

## 2017-12-05 PROCEDURE — 83036 HEMOGLOBIN GLYCOSYLATED A1C: CPT

## 2017-12-05 PROCEDURE — 84443 ASSAY THYROID STIM HORMONE: CPT

## 2017-12-05 PROCEDURE — 85025 COMPLETE CBC W/AUTO DIFF WBC: CPT

## 2017-12-11 ENCOUNTER — HOSPITAL ENCOUNTER (OUTPATIENT)
Dept: ULTRASOUND IMAGING | Facility: HOSPITAL | Age: 42
Discharge: HOME | End: 2017-12-11
Payer: COMMERCIAL

## 2017-12-11 DIAGNOSIS — R35.8 OTHER POLYURIA (CODE): ICD-10-CM

## 2017-12-11 DIAGNOSIS — R31.9 HEMATURIA: ICD-10-CM

## 2017-12-11 DIAGNOSIS — R35.0 FREQUENCY OF MICTURITION: ICD-10-CM

## 2017-12-11 PROCEDURE — 76770 US EXAM ABDO BACK WALL COMP: CPT

## 2017-12-12 ENCOUNTER — GENERIC CONVERSION - ENCOUNTER (OUTPATIENT)
Dept: OTHER | Facility: OTHER | Age: 42
End: 2017-12-12

## 2018-01-08 ENCOUNTER — ALLSCRIPTS OFFICE VISIT (OUTPATIENT)
Dept: OTHER | Facility: OTHER | Age: 43
End: 2018-01-08

## 2018-01-08 ENCOUNTER — GENERIC CONVERSION - ENCOUNTER (OUTPATIENT)
Dept: OTHER | Facility: OTHER | Age: 43
End: 2018-01-08

## 2018-01-09 NOTE — PROGRESS NOTES
Assessment   1  Chronic neck pain (723 1,338 29) (M54 2,G89 29)   2  Cervical myofascial pain syndrome (729 1) (M79 1)   3  Cervical spondylosis (721 0) (M47 812)    Plan   Cervical spondylosis, Chronic neck pain    · Follow-up visit in 2 months Evaluation and Treatment  Follow-up with AO for reevaluation     Status: Hold For - Scheduling  Requested for: [id number]   Ordered; For: Cervical spondylosis, Chronic neck pain; Ordered By: Zeenat Watkins Performed:  Due: 58FKE0835  Chronic neck pain, Neck pain on left side    · DULoxetine HCl - 30 MG Oral Capsule Delayed Release Particles; TAKE 1    CAPSULE AT BEDTIME   Rx By: Zeenat Medicariane; Dispense: 30 Days ; #:30 Capsule Delayed Release Particles; Refill: 1;For: Chronic neck pain, Neck pain on left side; JAE = N; Verified Transmission to [address]; Last Updated By: SystemRewardsForce; 1/8/2018 11:29:12 AM    Discussion/Summary      The patient presents today for a follow-up office visit  The patient is currently being treated for cervical spondylosis, chronic left-sided neck pain  The patient is no longer participating in Physical therapy, he had to stop due to other health issues  He is no longer using chlorzoxazone as it was not providing any relief, and pennsaid made his skin itchy  this time I would like to initiate cymbalta to decrease his neck pain symptoms  He was educated on the most common side effects which are drowsiness, dizziness, and nausea  He is aware it may take 3-4 weeks for the effects of the medication to be noticeable  PDMP was reviewed today and was appropriate   will follow up in 8 weeks for re-evaluation of duloxetine, this may need to be increased  Patient is able to Self-Care  The treatment plan was reviewed with the patient/guardian  The patient/guardian understands and agrees with the treatment plan      Chief Complaint   1   Neck Pain  Left-sided neck and trapezius pain      History of Present Illness   The patient presents today for a follow-up office visit  The patient is currently being treated for cervical spondylosis, chronic left-sided neck pain  The patient is no longer participating in Physical therapy, he had to stop due to other health issues  He is no longer using chlorzoxazone as it was not providing any relief, and pennsaid made his skin itchy  the patient rates his pain 7/10, this is constant in nature and bothersome throughout the entirety of the day  He describes his pain as burning, sharp, throbbing, pressure-like, shooting, and numbness  He localizes pain to the left side of his neck and the left upper trapezius  He is occasionally getting radiating left arm pain    have personally reviewed and/or updated the patient's past medical history, past surgical history, family history, social history, current medications, allergies, and vital signs today  Batsheva Gillespie presents with complaints of gradual onset of constant episodes of moderate left posterior neck pain, described as sharp, burning and throbbing, radiating to the left trapezius  On a scale of 1 to 10, the patient rates the pain as 7  Symptoms are unchanged  Review of Systems        Constitutional: no fever,-- no recent weight gain-- and-- no recent weight loss  Eyes: no double vision-- and-- no blurry vision  Cardiovascular: no chest pain,-- no palpitations-- and-- no lower extremity edema  Respiratory: no complaints of shortness of breath-- and-- no wheezing  Musculoskeletal: difficulty walking,-- muscle weakness,-- joint stiffness,-- joint swelling left side of neck-- and-- decreased range of motion, but-- no limb swelling-- and-- no pain in extremity  Neurological: no dizziness,-- no difficulty swallowing,-- no memory loss,-- no loss of consciousness-- and-- no seizures  Gastrointestinal: no nausea,-- no vomiting,-- no constipation-- and-- no diarrhea        Genitourinary: no difficulty initiating urine stream,-- no genital pain-- and-- no frequent urination  Integumentary: no complaints of skin rash  Psychiatric: no depression  Endocrine: no excessive thirst,-- no adrenal disease,-- no hypothyroidism-- and-- no hyperthyroidism  Hematologic/Lymphatic: no tendency for easy bruising-- and-- no tendency for easy bleeding  Active Problems   1  Abdominal pain (789 00) (R10 9)   2  Abnormal magnetic resonance imaging of cervical spine (793 7) (R93 7)   3  Cervical myofascial pain syndrome (729 1) (M79 1)   4  Cervical spondylosis (721 0) (M47 812)   5  Chronic low back pain (724 2,338 29) (M54 5,G89 29)   6  Chronic neck pain (723 1,338 29) (M54 2,G89 29)   7  Depression screening (V79 0) (Z13 89)   8  Diverticulosis (562 10) (K57 90)   9  Epidermoid cyst of face (706 2) (L72 0)   10  Frequency of urination and polyuria (788 41,788 42) (R35 0,R35 8)   11  Hematuria (599 70) (R31 9)   12  Hemorrhoids (455 6) (K64 9)   13  History of colonic polyps (V12 72) (Z86 010)   14  Lipid screening (V77 91) (Z13 220)   15  Myofascial pain syndrome (729 1) (M79 1)   16  Neck pain on left side (723 1) (M54 2)   17  Need for influenza vaccination (V04 81) (Z23)   18  Paresthesias (782 0) (R20 2)   19  Polydipsia (783 5) (R63 1)   20  History of Rectal bleeding (569 3) (K62 5)   21  Right lumbar radiculopathy (724 4) (M54 16)    Past Medical History   1  History of depression (V11 8) (Z86 59)   2  History of Rectal bleeding (569 3) (K62 5)    Surgical History   1  History of Knee Arthroscopy (Therapeutic)    Family History   Mother    1  Family history of diabetes mellitus (V18 0) (Z83 3)   2  Family history of hypertension (V17 49) (Z82 49)   3  Family history of lung cancer (V16 1) (Z80 1)  Father    4   No pertinent family history    Social History    · Currently unemployed and looking for work (V62 0) (Z56 0)   · History of drug use (305 93) (Z87 898)   · Never a smoker   · No preference on Buddhist beliefs   · Occasional alcohol use   · Single, in a monogamous relationship   · Uses marijuana (305 20) (F12 90)    Allergies   1  No Known Drug Allergies    Vitals   Vital Signs    Recorded: 96CQJ9869 11:03AM   Heart Rate 60   Respiration 12   Systolic 389   Diastolic 72   Height 5 ft 9 in   Weight 131 lb    BMI Calculated 19 35   BSA Calculated 1 73   Pain Scale 7     Physical Exam        Constitutional      General appearance: Well developed, well nourished, alert, in no distress, non-toxic and no overt pain behavior  Eyes      Sclera: anicteric      HEENT      Hearing grossly intact  Pulmonary      Respiratory effort: Even and unlabored  Psychiatric      Mood and affect: Mood and affect appropriate  Neurologic      Cranial nerves: Cranial nerves II-XII grossly intact  Musculoskeletal      Gait and station: Normal        Cervical Spine examination demonstrates Cervical Spine:      Appearance: Forward head carry  Tenderness: left paraspinal tenderness-- and-- left trapezius muscle  Flexion was painful  Extension was painful  Left lateral flexion was painful  Right lateral flexion was painful  Rotation to the left was painful  Rotation to the right was painful       hand strength was normal bilaterally  wrist strength was normal bilaterally  elbow strength was normal bilaterally  shoulder strength was normal bilaterally  Results/Data   Results Free Text Form [facility]:    Results        MRI:  MRI CERVICAL SPINE WITHOUT CONTRAST     INDICATION: 29-year-old male, posterior lower neck pain   COMPARISON: None  TECHNIQUE: Sagittal T1, sagittal T2, sagittal inversion recovery, axial T2, axial 2D merge     IMAGE QUALITY: Diagnostic     FINDINGS:     ALIGNMENT: Normal alignment of the cervical spine  No compression fracture  No subluxation  No scoliosis  MARROW SIGNAL: Normal marrow signal is identified within the visualized bony structures   No discrete marrow lesion  CERVICAL AND VISUALIZED THORACIC CORD: Minimally prominent central canal of the spinal cord extends from approximately C3-C7, likely developmental  The degree of prominence is not sufficient to be considered typical syrinx  3 month MRI follow-up   recommended performed without and with IV contrast      PREVERTEBRAL AND PARASPINAL SOFT TISSUES: Prevertebral and paraspinal soft tissues are unremarkable  VISUALIZED POSTERIOR FOSSA: The visualized posterior fossa demonstrates no abnormal signal      CERVICAL DISC SPACES:     C2-C3: Normal      C3-C4: Normal      C4-C5: Minimal degenerative disc and facet disease, no stenosis     C5-C6: Minimal degenerative disc and facet disease, no stenosis     C6-C7: Minimal degenerative disc and facet disease, no stenosis     C7-T1: Normal      UPPER THORACIC DISC SPACES: Normal        IMPRESSION:   Minimal multilevel degenerative spondylosis     No evidence of disc herniation, central canal or foraminal stenosis     Mild multilevel prominence of the central canal of the cervical spinal cord, likely developmental  MRI follow-up recommended as discussed above       ##sigslh##sigslh              Signatures    Electronically signed by : Trinh Alves; Jan 8 2018 11:32AM EST                       (Author)     Electronically signed by : Brigida Avila DO; Jan 8 2018 12:02PM EST

## 2018-01-09 NOTE — RESULT NOTES
Verified Results  * MRI LUMBAR SPINE WO CONTRAST 30Apr2017 08:16AM Lydia Gaona Order Number: [id number]    - Patient Instructions: To schedule this appointment, please contact Central Scheduling at [phone]  Test Name Result Flag Reference   MRI LUMBAR SPINE [address] (Report)     This is a summary report  The complete report is available in the patient's medical record  If you cannot access the medical record, please contact the sending organization for a detailed fax or copy  MRI LUMBAR SPINE WITHOUT CONTRAST     INDICATION: Low back pain for 9 years  Pain radiates down the left leg  COMPARISON: None  TECHNIQUE: Sagittal T1, sagittal T2, sagittal inversion recovery, axial T1 and axial T2, coronal T2       IMAGE QUALITY: Diagnostic     FINDINGS:     ALIGNMENT: Normal alignment of the lumbar spine  No compression fracture  No spondylolysis or spondylolisthesis  No scoliosis  MARROW SIGNAL: Normal marrow signal is identified within the visualized bony structures  No discrete marrow lesion  DISTAL CORD AND CONUS: Normal size and signal within the distal cord and conus  The conus ends at the L1 level  PARASPINAL SOFT TISSUES: Paraspinal soft tissues are unremarkable  SACRUM: Normal signal within the sacrum  No evidence of insufficiency or stress fracture  LOWER THORACIC DISC SPACES: Normal disc height and signal  No disc herniation, canal stenosis or foraminal narrowing  LUMBAR DISC SPACES: Disks maintain normal height and hydration  No central or foraminal narrowing  IMPRESSION:     Normal MRI of the lumbar spine         Workstation performed: [id number]     Signed by:   Nadia De Los Santos DO   5/1/17

## 2018-01-09 NOTE — MISCELLANEOUS
Message   Recorded as Task   Date: 09/28/2017 09:55 AM, Created By: Narinder Stokes   Task Name: Miscellaneous   Assigned To: [id number] [address] clinical,Team   Regarding Patient: Jose Trejo, Status: Active   Comment:    Doris Solitario - 28 Sep 2017 9:55 AM     TASK CREATED  physical therapist Robson Stevens is calling and would like to talk with someone about pt  please call her at [phone] - 28 Sep 2017 10:34 AM     TASK EDITED  S/w Robson Stevens PT  Robson Stevens stated today the pt had "dysphagia, vision changes, saccades w/ visual tracking, positive test for transverse ligament dysfunction and positive test for vertebral basiliar insufficiency "  Robson Stevens stated the pt had "no significant symptoms while he was there" so she didn't refer him to the ER  Robson Stevens wanted to know if the pt should F/U w/ [facility] or neuology and/or if need additional imaging? Robson Stevens wants to know how to proceed  Please advise  Siria Carrero - 28 Sep 2017 10:37 AM     TASK REPLIED TO: Previously Assigned To Siria Carrero                      recommend follow up with neurology for these symptoms   Inessa Martins - 28 Sep 2017 11:16 AM     TASK EDITED  S/W Robson Stevens PT  Advised her of the same and that the pt should go through PCP for referal   Robson Stevens stated she will let the pt know  Siria Carrero - 28 Sep 2017 11:20 AM     TASK REPLIED TO: Previously Assigned To Siria Carrero                   agree        Active Problems    1  Abnormal magnetic resonance imaging of cervical spine (793 7) (R93 7)   2  Cervical spondylosis (721 0) (M47 812)   3  Chronic low back pain (724 2,338 29) (M54 5,G89 29)   4  Chronic neck pain (723 1,338 29) (M54 2,G89 29)   5  Depression screening (V79 0) (Z13 89)   6  Diverticulosis (562 10) (K57 90)   7  Hemorrhoids (455 6) (K64 9)   8  History of colonic polyps (V12 72) (Z86 010)   9  Neck pain on left side (723 1) (M54 2)   10  Need for influenza vaccination (V04 81) (Z23)   11  History of Rectal bleeding (569 3) (K62 5)   12   Right lumbar radiculopathy (724 4) (M54 16)    Current Meds   1  Cyclobenzaprine HCl - 10 MG Oral Tablet; TAKE ONE-HALF TO 1 TABLET 3 TIMES    DAILY AS NEEDED; Therapy: [id number] to (Last Rx:11Niy5680)  Requested for: 89Crt4596 Ordered   2  Diclofenac Sodium 1 % Transdermal Gel; Apply 4 grams to affected area QID PRN; Therapy: 87AML4553 to (Last Flor John)  Requested for: 32YUF5819 Ordered   3  Naproxen 500 MG Oral Tablet; TAKE 1 TABLET TWICE DAILY WITH MEALS; Therapy: 30LOF2046 to (Evaluate:72Njg6607)  Requested for: 85LLP3651; Last   Rx:[id number] Ordered    Allergies    1   No Known Drug Allergies    Signatures   Electronically signed by : Sandrine Courtney, ; Sep 28 2017 11:25AM EST                       (Author)

## 2018-01-11 NOTE — MISCELLANEOUS
Message   Recorded as Task   Date: 09/20/2017 09:21 AM, Created By: Debo Gilliam   Task Name: Miscellaneous   Assigned To: [id number] clinical,Team   Regarding Patient: Rodrigo Prince, Status: Active   Comment:    Doris Solitario - 20 Sep 2017 9:21 AM     TASK CREATED  pt said his cream that was ordered is not at the pharmacy  [facility] on [address] [place] [address] # [phone]   Andrea Martin - 20 Sep 2017 9:36 AM     TASK REASSIGNED: Previously Assigned To [id number] clinical,Team   Femi Madrigal - 20 Sep 2017 10:25 AM     TASK REPLIED TO: Previously Assigned To Andrea Gonzalez - 20 Sep 2017 10:35 AM     TASK EDITED  S/W pt  Advised pt same as above  Active Problems    1  Abnormal magnetic resonance imaging of cervical spine (793 7) (R93 7)   2  Cervical spondylosis (721 0) (M47 812)   3  Chronic low back pain (724 2,338 29) (M54 5,G89 29)   4  Chronic neck pain (723 1,338 29) (M54 2,G89 29)   5  Depression screening (V79 0) (Z13 89)   6  Diverticulosis (562 10) (K57 90)   7  Hemorrhoids (455 6) (K64 9)   8  History of colonic polyps (V12 72) (Z86 010)   9  Neck pain on left side (723 1) (M54 2)   10  Need for influenza vaccination (V04 81) (Z23)   11  History of Rectal bleeding (569 3) (K62 5)   12  Right lumbar radiculopathy (724 4) (M54 16)    Current Meds   1  Cyclobenzaprine HCl - 10 MG Oral Tablet; TAKE ONE-HALF TO 1 TABLET 3 TIMES    DAILY AS NEEDED; Therapy: 50WMY8212 to (Last Rx:66Cpm0429)  Requested for: 36Fjt4729 Ordered   2  Diclofenac Sodium 1 % Transdermal Gel; Apply 4 grams to affected area QID PRN; Therapy: 25YWU5731 to (Last Darreld Brave)  Requested for: 81AGY9111 Ordered   3  Naproxen 500 MG Oral Tablet; TAKE 1 TABLET TWICE DAILY WITH MEALS; Therapy: 08DEU5614 to (Evaluate:84Vqe5665)  Requested for: 82PSI7042; Last   Rx:79Rqo7355 Ordered    Allergies    1   No Known Drug Allergies    Signatures   Electronically signed by : Cassie Nelson RN; Sep 20 2017 10:36AM EST                       (Author)

## 2018-01-12 VITALS — WEIGHT: 133 LBS | DIASTOLIC BLOOD PRESSURE: 70 MMHG | BODY MASS INDEX: 19.64 KG/M2 | SYSTOLIC BLOOD PRESSURE: 120 MMHG

## 2018-01-12 VITALS
WEIGHT: 132 LBS | RESPIRATION RATE: 18 BRPM | DIASTOLIC BLOOD PRESSURE: 70 MMHG | OXYGEN SATURATION: 100 % | SYSTOLIC BLOOD PRESSURE: 138 MMHG | HEART RATE: 88 BPM | BODY MASS INDEX: 19.55 KG/M2 | HEIGHT: 69 IN | TEMPERATURE: 98.4 F

## 2018-01-12 NOTE — MISCELLANEOUS
Message  Mailed colono letter to patients home address  Tasked PCPs office  {Updated pre-visit planning to reflect 5 year follow up  }      Active Problems    1  Chronic low back pain (724 2,338 29) (M54 5,G89 29)   2  Chronic neck pain (723 1,338 29) (M54 2,G89 29)   3  Depression (311) (F32 9)   4  Depression screening (V79 0) (Z13 89)   5  Need for influenza vaccination (V04 81) (Z23)   6  Rectal bleeding (569 3) (K62 5)   7  Right lumbar radiculopathy (724 4) (M54 16)    Current Meds   1  Cyclobenzaprine HCl - 10 MG Oral Tablet; TAKE ONE-HALF TO 1 TABLET 3 TIMES    DAILY AS NEEDED; Therapy: 07IZH6697 to (Last Rx:31Mar2017)  Requested for: 12FLK9682 Ordered   2  Naproxen 500 MG Oral Tablet; TAKE 1 TABLET TWICE DAILY WITH MEALS; Therapy: 16ZNB8223 to (Evaluate:14Nbc5109)  Requested for: 16GPL0278; Last   Rx:31Mar2017 Ordered    Allergies    1  No Known Drug Allergies    Plan  Rectal bleeding    · COLONOSCOPY ; every 5 years;  Last 69DLV8355; Next 57OOS5381; Status:Active    Signatures   Electronically signed by : Joselito Richards, ; May 17 2017 11:08AM EST                       (Author)

## 2018-01-13 VITALS
WEIGHT: 136 LBS | DIASTOLIC BLOOD PRESSURE: 70 MMHG | HEART RATE: 72 BPM | HEIGHT: 69 IN | TEMPERATURE: 96.4 F | BODY MASS INDEX: 20.14 KG/M2 | SYSTOLIC BLOOD PRESSURE: 102 MMHG | RESPIRATION RATE: 20 BRPM

## 2018-01-13 VITALS
OXYGEN SATURATION: 100 % | DIASTOLIC BLOOD PRESSURE: 80 MMHG | BODY MASS INDEX: 18.96 KG/M2 | HEIGHT: 69 IN | TEMPERATURE: 98.4 F | HEART RATE: 88 BPM | SYSTOLIC BLOOD PRESSURE: 128 MMHG | RESPIRATION RATE: 18 BRPM | WEIGHT: 128 LBS

## 2018-01-13 VITALS
SYSTOLIC BLOOD PRESSURE: 110 MMHG | HEART RATE: 88 BPM | RESPIRATION RATE: 18 BRPM | BODY MASS INDEX: 19.26 KG/M2 | OXYGEN SATURATION: 99 % | DIASTOLIC BLOOD PRESSURE: 70 MMHG | TEMPERATURE: 98.8 F | WEIGHT: 130 LBS | HEIGHT: 69 IN

## 2018-01-14 VITALS
WEIGHT: 129 LBS | SYSTOLIC BLOOD PRESSURE: 118 MMHG | RESPIRATION RATE: 12 BRPM | DIASTOLIC BLOOD PRESSURE: 62 MMHG | BODY MASS INDEX: 19.11 KG/M2 | HEART RATE: 60 BPM | HEIGHT: 69 IN

## 2018-01-14 VITALS
TEMPERATURE: 96.9 F | DIASTOLIC BLOOD PRESSURE: 62 MMHG | HEIGHT: 69 IN | SYSTOLIC BLOOD PRESSURE: 106 MMHG | RESPIRATION RATE: 18 BRPM | WEIGHT: 136.13 LBS | OXYGEN SATURATION: 100 % | HEART RATE: 72 BPM | BODY MASS INDEX: 20.16 KG/M2

## 2018-01-14 NOTE — RESULT NOTES
Message   Recorded as Task   Date: 10/10/2017 03:37 PM, Created By: Li Roy   Task Name: Med Renewal Request   Assigned To: Clifton Cunningham   Regarding Patient: Laura Katz, Status:  In Progress   Carlos Ojeda - 10 Oct 2017 3:37 PM     TASK CREATED  Prior Authorization sent for Diclofenac sodium 1% gel wait for approval   Li Roy - 10 Oct 2017 3:38 PM     TASK IN PROGRESS   Diane Moses - 13 Oct 2017 9:51 AM     TASK EDITED  Gel is now covered by insurance per pt, who received a letter  Pt also confirmed with the pharmacy

## 2018-01-14 NOTE — MISCELLANEOUS
Message   Recorded as Task   Date: 05/05/2017 01:14 PM, Created By: Hung Dotson   Task Name: Care Coordination   Assigned To: [facility],Team   Regarding Patient: Xiomara Saavedra, Status: Active   CommentGabriel Mitchell - 05 May 2017 1:14 PM     TASK CREATED  tubular adenoma, 3-5 year follow up   Madison Hernández - 05 May 2017 1:24 PM     TASK EDITED  Called patient with path results  Told to call our office in 3-5 years for repeat colonoscopy and to expect a letter in the mail stating this  Active Problems    1  Chronic low back pain (724 2,338 29) (M54 5,G89 29)   2  Chronic neck pain (723 1,338 29) (M54 2,G89 29)   3  Depression (311) (F32 9)   4  Depression screening (V79 0) (Z13 89)   5  Need for influenza vaccination (V04 81) (Z23)   6  Rectal bleeding (569 3) (K62 5)   7  Right lumbar radiculopathy (724 4) (M54 16)    Current Meds   1  Cyclobenzaprine HCl - 10 MG Oral Tablet; TAKE ONE-HALF TO 1 TABLET 3 TIMES    DAILY AS NEEDED; Therapy: 98RZN3215 to (Last Rx:31Mar2017)  Requested for: 69ZWA8709 Ordered   2  Naproxen 500 MG Oral Tablet; TAKE 1 TABLET TWICE DAILY WITH MEALS; Therapy: 93ROM3044 to (Evaluate:73Jeu9038)  Requested for: 86VJW5659; Last   Rx:31Mar2017 Ordered    Allergies    1   No Known Drug Allergies    Signatures   Electronically signed by : Ulises Montano, ; May  5 2017  1:25PM EST                       (Author)

## 2018-01-15 NOTE — RESULT NOTES
Verified Results  * MRI CERVICAL SPINE WO CONTRAST 76EPC2011 10:27PM Tia Goode     Test Name Result Flag Reference   MRI CERVICAL SPINE [address] (Report)     MRI CERVICAL SPINE WITHOUT CONTRAST     INDICATION: 36-year-old male, posterior lower neck pain   COMPARISON: None  TECHNIQUE: Sagittal T1, sagittal T2, sagittal inversion recovery, axial T2, axial 2D merge        IMAGE QUALITY: Diagnostic     FINDINGS:     ALIGNMENT: Normal alignment of the cervical spine  No compression fracture  No subluxation  No scoliosis  MARROW SIGNAL: Normal marrow signal is identified within the visualized bony structures  No discrete marrow lesion  CERVICAL AND VISUALIZED THORACIC CORD: Minimally prominent central canal of the spinal cord extends from approximately C3-C7, likely developmental  The degree of prominence is not sufficient to be considered typical syrinx  3 month MRI follow-up    recommended performed without and with IV contrast      PREVERTEBRAL AND PARASPINAL SOFT TISSUES: Prevertebral and paraspinal soft tissues are unremarkable       VISUALIZED POSTERIOR FOSSA: The visualized posterior fossa demonstrates no abnormal signal      CERVICAL DISC SPACES:        C2-C3: Normal      C3-C4: Normal      C4-C5: Minimal degenerative disc and facet disease, no stenosis     C5-C6: Minimal degenerative disc and facet disease, no stenosis     C6-C7: Minimal degenerative disc and facet disease, no stenosis     C7-T1: Normal      UPPER THORACIC DISC SPACES: Normal        IMPRESSION:   Minimal multilevel degenerative spondylosis     No evidence of disc herniation, central canal or foraminal stenosis     Mild multilevel prominence of the central canal of the cervical spinal cord, likely developmental  MRI follow-up recommended as discussed above       ##sigslh##sigslh       Workstation performed: [id number]     Signed by:   Tereza Niño MD   6/12/17       Plan  Abnormal magnetic resonance imaging of cervical spine · * MRI CERVICAL SPINE WO CONTRAST; Status:Need Information - Financial  Authorization;  Requested for:90Cla9859;

## 2018-01-15 NOTE — MISCELLANEOUS
Message   Recorded as Task   Date: 05/03/2017 11:08 AM, Created By: Caridad Freed   Task Name: Follow Up   Assigned To: [facility],Team   Regarding Patient: Jona Duverney, Status: Active   CommentMikeybernardo Merrill - 03 May 2017 11:08 AM     TASK CREATED  colono = 5/2/17  Called and spoke to patients wife, Dimas Hwang  She states that patient is doing fine  No F/V/N/C  He had a BM this morning  She's aware that pathology is pending and of 5 year recommended followup  She's aware that follow up letter will be mailed to home address  Madison Hernández - 05 May 2017 1:26 PM     TASK EDITED  see previous task        Active Problems    1  Chronic low back pain (724 2,338 29) (M54 5,G89 29)   2  Chronic neck pain (723 1,338 29) (M54 2,G89 29)   3  Depression (311) (F32 9)   4  Depression screening (V79 0) (Z13 89)   5  Need for influenza vaccination (V04 81) (Z23)   6  Rectal bleeding (569 3) (K62 5)   7  Right lumbar radiculopathy (724 4) (M54 16)    Current Meds   1  Cyclobenzaprine HCl - 10 MG Oral Tablet; TAKE ONE-HALF TO 1 TABLET 3 TIMES    DAILY AS NEEDED; Therapy: 67IMZ2060 to (Last Rx:31Mar2017)  Requested for: 78ZYH7697 Ordered   2  Naproxen 500 MG Oral Tablet; TAKE 1 TABLET TWICE DAILY WITH MEALS; Therapy: 58ZMK6819 to (Evaluate:09Pgv6395)  Requested for: 30DWN6613; Last   Rx:31Mar2017 Ordered    Allergies    1   No Known Drug Allergies    Signatures   Electronically signed by : Shyla Presley, ; May  5 2017  1:26PM EST                       (Author)

## 2018-01-16 NOTE — MISCELLANEOUS
Message   Recorded as Task   Date: 10/06/2017 08:38 AM, Created By: Liset Gross   Task Name: Miscellaneous   Assigned To: [id number] [address] clinical,Team   Regarding Patient: Laura Katz, Status: Active   Comment:    AltafDoris - 06 Oct 2017 8:38 AM     TASK CREATED  pts cream that was prescribed was not covered before but he just got a letter in the mail saying it is covered now so he wants to know if you can send it to pharmacy again  please  [phone]   Norris Zhu - 06 Oct 2017 9:35 AM     TASK IN PROGRESS   VarunBarb - 06 Oct 2017 9:37 AM     TASK EDITED  S/w pt  States diclofenac gel now covered by his insurance, per letter he recieved in the mail  Pharmacy verified  Please reorder  Norris Zhu - 06 Oct 2017 9:38 AM     TASK EDITED   Norris Zhu - 06 Oct 2017 9:44 AM     TASK REASSIGNED: Previously Assigned To Peter Palencia - 06 Oct 2017 10:00 AM     TASK REPLIED TO: Previously Assigned To [id number] [place] [address] clinical,Team  I escribed a script with a refill for the Diclofenac GEl as requested  Thank you  Marjorie Bynum - 06 Oct 2017 10:05 AM     TASK EDITED  S/W pt  Advised pt of the same and [facility]  Pt verbalized understanding  Active Problems    1  Abnormal magnetic resonance imaging of cervical spine (793 7) (R93 7)   2  Cervical spondylosis (721 0) (M47 812)   3  Chronic low back pain (724 2,338 29) (M54 5,G89 29)   4  Chronic neck pain (723 1,338 29) (M54 2,G89 29)   5  Depression screening (V79 0) (Z13 89)   6  Diverticulosis (562 10) (K57 90)   7  Hemorrhoids (455 6) (K64 9)   8  History of colonic polyps (V12 72) (Z86 010)   9  Neck pain on left side (723 1) (M54 2)   10  Need for influenza vaccination (V04 81) (Z23)   11  History of Rectal bleeding (569 3) (K62 5)   12  Right lumbar radiculopathy (724 4) (M54 16)    Current Meds   1  Cyclobenzaprine HCl - 10 MG Oral Tablet; TAKE ONE-HALF TO 1 TABLET 3 TIMES    DAILY AS NEEDED;    Therapy: 67RBS0086 to (Last Rx:39Mdy6291)  Requested for: 09Emf1733 Ordered   2  Diclofenac Sodium 1 % Transdermal Gel; Apply 4 grams to affected area QID PRN; Therapy: 83ENE9021 to (Claudia Tamayo)  Requested for: 07GFO7961; Last   Rx:81Fqx9868 Ordered   3  Naproxen 500 MG Oral Tablet; TAKE 1 TABLET TWICE DAILY WITH MEALS; Therapy: 68TZE5506 to (Evaluate:54Xpt5776)  Requested for: 60OJV7466; Last   Rx:15Kwb1527 Ordered    Allergies    1   No Known Drug Allergies    Signatures   Electronically signed by : Liset Bhatia, ; Oct  6 2017 10:05AM EST                       (Author)

## 2018-01-17 NOTE — RESULT NOTES
Message   Recorded as Task   Date: 07/20/2017 03:08 PM, Created By: Junie Roth   Task Name: Follow Up   Assigned To: Keyona Smiley   Regarding Patient: Katerina Laughlin, Status: Active   CommentGaila Mian - 20 Jul 2017 3:08 PM     TASK CREATED  please schedule MBB#2   Gavin,Shae - 20 Jul 2017 3:09 PM     TASK REASSIGNED: Previously Assigned To [address] [place] [address] clinical,Team   Keyona Smiley - 20 Jul 2017 4:08 PM     TASK EDITED  noted, will coordinate   Keyona Smiley - 21 Jul 2017 10:34 AM     TASK EDITED  As indicated in IDX, preferred contact method is mobile #; called cell and left message to call me back  Stormy Cartagena - 24 Jul 2017 8:56 AM     TASK EDITED  phone call from patient stating that he feels like he has something in his throat, patient states he felt this way since the procedure on 07/19  please call patient at [phone] (cell)  Chula Shea - 24 Jul 2017 1:40 PM     TASK EDITED  S/w pt regarding above  Per pt the day after his procedure on 7/19 he felt like there was something "caught" in his throat  Per pt he doesn't have trouble breathing and feels like he has to constantly "clear" his throat  Per pt he only had approx 2 hours of pain relief from the MBB#1 and actually had such increased pressure from the injection on his left side of his neck that did not go away until three days after procedure  Per pt he has such pressure and "tenderness" noted at the "ball of his neck "    Per pt maybe the feeling of having something stuck in his throat is because of the tenderness at the "ball of neck "    Advised pt that I would send this information to Kayla Block and c/b with his recommendations for same  ***please advise on above thank you***   Michael Carroll - 24 Jul 2017 2:17 PM     TASK REPLIED TO: Previously Assigned To Junie Roth                      aware, rec follow up with urgent care or pcp regarding throat symptoms, not related to procedure  can schedule MBB#2 when comfortable to  Chula Shea - 24 Jul 2017 3:51 PM     TASK EDITED  S/w pt regarding above  Per pt he stated that he will call his pcp regarding his throat, however feels that he really did not get enough pain relief to have a socond MBB  Advised pt that I would send this information to Desi Hu to see what he would recommend  ***please advise thank you***   Ping Joels - 24 Jul 2017 3:58 PM     TASK REPLIED TO: Previously Assigned To Ping Hopes                      understood, that is fine, he can follow up with his PCP or AO for further recs   Chula Shea - 25 Jul 2017 10:16 AM     TASK EDITED  ***pt to see pcp today at 3:40***   Chula Shea - 25 Jul 2017 10:16 AM     TASK IN PROGRESS   Shae Alonso - 25 Jul 2017 1:33 PM     TASK EDITED  Left vm on number provided for pt to c/b and s/w the nurse  Office hours and c/b # provided  **pls relay below rec from Dr Travon Webster that he can f/u with his PCP or AO for further rec if he doesn't want to have the 2nd MBB done  **   Shae Alonso - 25 Jul 2017 4:06 PM     TASK EDITED  S/W pt and advised that per Desi Hu if he doesn't want to do MBB#2 then he can f/u with PCP or AO for further rec  Pt said he has a letter from his Ins co that MBB #2 might not be covered by his Ins co so he doesn't think he should schedule it  Told pt I would have Keyona the  call him tomorrow and he should provide that info to her b/c she would be the one to get the approval from his Ins co for MBB #2  Pls call pt on his cell [phone]  Keyona Smiley - 25 Jul 2017 4:15 PM     TASK EDITED  correct - it is not approved, pending the pain diary from #1 MBB   Keyona Smiley - 26 Jul 2017 11:30 AM     TASK EDITED  Faxed MBB #1 pain diary to [employer] [facility] to see if they will auth #2; will call patient to schedule once approved     Keyona Smiley - 28 Jul 2017 2:08 PM     TASK EDITED  Left message for patient with update; when I get response from [employer] regarding #2 MBB, I will call him to coordinate Keyona Smiley - 31 Jul 2017 7:51 AM     TASK EDITED  Message rec'd (7/28/17 @4487) from Dal Landau at [address]: MBB #2 has been denied by the medical director as the pain diary and patient note from PCP visit shows that he did not have an benefit from #1  Gvep-re-uuku can by done if initiaed within 2 business, [phone]  Ref#: [id number]    Do you wish for cvco-ou-xtkn? OV to discuss further options? Martin Harding - 31 Jul 2017 11:52 AM     TASK REPLIED TO: Previously Assigned To Martin Harding                      aware, follow up with AO or PCP for further management  no other procedural options available at this time  Keyona Smiley - 31 Jul 2017 12:03 PM     TASK EDITED  Spoke with patient; aware of MBB #2 denial and understands  Would like to f/u with AO for any further options (non-procedural) for him  Scheduled for 9/6, [address] [place] [address]

## 2018-01-18 NOTE — MISCELLANEOUS
Message   Recorded as Task   Date: 03/31/2017 02:02 PM, Created By: System   Task Name: Schedule Appointment   Assigned To: [facility],Team   Regarding Patient: Mee Suarez, Status: Active   Comment:    System - 31 Mar 2017 2:02 PM     Preferred Communication: Mail  Ordering Site: [address] [place] [address]    Referred To: Leonila Richter MD, [facility]  To Be Done: 31 Mar 2017   Ash Russo - 31 Mar 2017 2:12 PM     TASK REASSIGNED: Previously Assigned To Tia Goode  Please call patient to schedule an appointment within 48 hours on their  The best time to reach the patient is in the  Patient prefers appointment to be  or     Last Mammogram date:[  ]    After this appointment is scheduled please reply back to [  ] team    Marcia Sanchez - 03 Apr 2017 8:52 AM     TASK EDITED  Received call from patient  Scheduled consultation for Monday, 4/10/17 in the [facility] office  Aware of office location  Mailed packet to home address  Active Problems    1  Chronic low back pain (724 2,338 29) (M54 5,G89 29)   2  Chronic neck pain (723 1,338 29) (M54 2,G89 29)   3  Depression (311) (F32 9)   4  Depression screening (V79 0) (Z13 89)   5  Need for influenza vaccination (V04 81) (Z23)   6  Rectal bleeding (569 3) (K62 5)   7  Right lumbar radiculopathy (724 4) (M54 16)    Current Meds   1  Cyclobenzaprine HCl - 10 MG Oral Tablet; TAKE ONE-HALF TO 1 TABLET 3 TIMES    DAILY AS NEEDED; Therapy: 01PIU6734 to (Last Rx:31Mar2017)  Requested for: 45SPA5901 Ordered   2  Naproxen 500 MG Oral Tablet; TAKE 1 TABLET TWICE DAILY WITH MEALS; Therapy: 55KGH7797 to (Evaluate:01Lmh3546)  Requested for: 70JHJ1530; Last   Rx:31Mar2017 Ordered    Allergies    1   No Known Drug Allergies    Signatures   Electronically signed by : Trudi Rubinstein, ; Apr  3 2017  8:52AM EST                       (Author)

## 2018-01-22 VITALS
TEMPERATURE: 97.3 F | WEIGHT: 131 LBS | SYSTOLIC BLOOD PRESSURE: 128 MMHG | HEART RATE: 93 BPM | BODY MASS INDEX: 19.4 KG/M2 | HEIGHT: 69 IN | RESPIRATION RATE: 18 BRPM | DIASTOLIC BLOOD PRESSURE: 82 MMHG

## 2018-01-22 VITALS
SYSTOLIC BLOOD PRESSURE: 152 MMHG | DIASTOLIC BLOOD PRESSURE: 72 MMHG | WEIGHT: 132 LBS | BODY MASS INDEX: 19.55 KG/M2 | HEART RATE: 80 BPM | RESPIRATION RATE: 16 BRPM | HEIGHT: 69 IN

## 2018-01-23 VITALS
HEART RATE: 60 BPM | DIASTOLIC BLOOD PRESSURE: 72 MMHG | HEIGHT: 69 IN | WEIGHT: 131 LBS | BODY MASS INDEX: 19.4 KG/M2 | RESPIRATION RATE: 12 BRPM | SYSTOLIC BLOOD PRESSURE: 108 MMHG

## 2018-01-23 NOTE — RESULT NOTES
Verified Results  [facility] BLADDER 17Uuv8475 10:48AM Paco Foil Order Number: [id number]    - Patient Instructions: To schedule this appointment, please contact Central Scheduling at [phone]  Test Name Result Flag Reference   US KIDNEY AND BLADDER (Report)     RENAL ULTRASOUND     INDICATION: 20-year-old male with microscopic hematuria and urinary frequency     COMPARISON: None  TECHNIQUE:  Ultrasound of the retroperitoneum was performed with a curvilinear transducer utilizing volumetric sweeps and still imaging techniques  FINDINGS:     KIDNEYS:   Symmetric and normal size  Right kidney: 10 9 x 4 5 cm  Normal echogenicity and contour  No suspicious masses detected  No hydronephrosis  No shadowing calculi  No perinephric fluid collections  Left kidney: 10 3 x 5 7 cm  Normal echogenicity and contour  No suspicious masses detected  No hydronephrosis  No shadowing calculi  No perinephric fluid collections  URETERS:   Nonvisualized  BLADDER:    Normally distended  No focal thickening or mass lesions  Bilateral ureteral jets detected  IMPRESSION:     No significant abnormality identified   If symptoms persist, renal protocol CT scan suggested for further workup        Workstation performed: [id number]     Signed by:   Jeanne Graf MD   12/12/17

## 2018-01-23 NOTE — RESULT NOTES
Verified Results  (1) CBC/PLT/DIFF [id number] 09:13AM [facility] Order Number: [id number]     Test Name Result Flag Reference   WBC COUNT 11 64 Thousand/uL H 4 31-10 16   RBC COUNT 4 80 Million/uL  3 88-5 62   HEMOGLOBIN 14 8 g/dL  12 0-17 0   HEMATOCRIT 43 5 %  36 5-49 3   MCV 91 fL  82-98   MCH 30 8 pg  26 8-34 3   MCHC 34 0 g/dL  31 4-37 4   RDW 14 4 %  11 6-15 1   MPV 10 9 fL  8 9-12 7   PLATELET COUNT 769 Thousands/uL  149-390   nRBC AUTOMATED 0 /100 WBCs     NEUTROPHILS RELATIVE PERCENT 49 %  43-75   LYMPHOCYTES RELATIVE PERCENT 40 %  14-44   MONOCYTES RELATIVE PERCENT 7 %  4-12   EOSINOPHILS RELATIVE PERCENT 3 %  0-6   BASOPHILS RELATIVE PERCENT 1 %  0-1   NEUTROPHILS ABSOLUTE COUNT 5 81 Thousands/? ??L  1 85-7 62   LYMPHOCYTES ABSOLUTE COUNT 4 68 Thousands/? ??L H 0 60-4 47   MONOCYTES ABSOLUTE COUNT 0 76 Thousand/? ??L  0 17-1 22   EOSINOPHILS ABSOLUTE COUNT 0 33 Thousand/? ??L  0 00-0 61   BASOPHILS ABSOLUTE COUNT 0 06 Thousands/? ??L  0 00-0 10     (1) COMPREHENSIVE METABOLIC PANEL [id number] 55:80JR [facility] Order Number: [id number]     Test Name Result Flag Reference   SODIUM 138 mmol/L  136-145   POTASSIUM 3 9 mmol/L  3 5-5 3   CHLORIDE 103 mmol/L  100-108   CARBON DIOXIDE 29 mmol/L  21-32   ANION GAP (CALC) 6 mmol/L  4-13   BLOOD UREA NITROGEN 9 mg/dL  5-25   CREATININE 0 81 mg/dL  0 60-1 30   Standardized to IDMS reference method   CALCIUM 9 4 mg/dL  8 3-10 1   BILI, TOTAL 0 40 mg/dL  0 20-1 00   ALK PHOSPHATAS 50 U/L     ALT (SGPT) 17 U/L  12-78   Specimen collection should occur prior to Sulfasalazine administration due to the potential for falsely depressed results  AST(SGOT) 13 U/L  5-45   Specimen collection should occur prior to Sulfasalazine administration due to the potential for falsely depressed results     ALBUMIN 4 1 g/dL  3 5-5 0   TOTAL PROTEIN 7 3 g/dL  6 4-8 2   eGFR 110 ml/min/1 73sq [employer] Disease Education Program recommendations are as follows:  GFR calculation is accurate only with a steady state creatinine  Chronic Kidney disease less than 60 ml/min/1 73 sq  meters  Kidney failure less than 15 ml/min/1 73 sq  meters  GLUCOSE FASTING 103 mg/dL H 65-99   Specimen collection should occur prior to Sulfasalazine administration due to the potential for falsely depressed results  Specimen collection should occur prior to Sulfapyridine administration due to the potential for falsely elevated results  (1) HEMOGLOBIN A1C [id number] 09:13AM [facility] Order Number: [id number]     Test Name Result Flag Reference   HEMOGLOBIN A1C 5 2 %  4 2-6 3   EST  AVG  GLUCOSE 103 mg/dl       (1) LIPID PANEL, FASTING [id number] 09:13AM [facility] Order Number: [id number]     Test Name Result Flag Reference   CHOLESTEROL 177 mg/dL     HDL,DIRECT 49 mg/dL  40-60   Specimen collection should occur prior to Metamizole administration due to the potential for falsley depressed results  LDL CHOLESTEROL CALCULATED 105 mg/dL H 0-100   Triglyceride:        Normal <150 mg/dl   Borderline High 150-199 mg/dl   High 200-499 mg/dl   Very High >499 mg/dl      Cholesterol:       Desirable <200 mg/dl    Borderline High 200-239 mg/dl    High >239 mg/dl      HDL Cholesterol:       High>59 mg/dL    Low <41 mg/dL      This screening LDL is a calculated result  It does not have the accuracy of the Direct Measured LDL in the monitoring of patients with hyperlipidemia and/or statin therapy  Direct Measure LDL (LCL648) must be ordered separately in these patients  TRIGLYCERIDES 113 mg/dL  <=150   Specimen collection should occur prior to N-Acetylcysteine or Metamizole administration due to the potential for falsely depressed results       (1) TSH WITH FT4 REFLEX [id number] 09:13AM [facility] Order Number: [id number]     Test Name Result Flag Reference   TSH 1 010 uIU/mL  0 358-3 740   Patients undergoing fluorescein dye angiography may retain small amounts of fluorescein in the body for 48-72 hours post procedure  Samples containing fluorescein can produce falsely depressed TSH values  If the patient had this procedure,a specimen should be resubmitted post fluorescein clearance       (1) URINALYSIS w URINE C/S REFLEX (will reflex a microscopy if leukocytes, occult blood, or nitrites are not within normal limits) [id number] 09:13AM [facility] Order Number: [id number]     Test Name Result Flag Reference   COLOR Yellow     CLARITY Clear     PH UA 6 0  4 5-8 0   LEUKOCYTE ESTERASE UA Negative  Negative   NITRITE UA Negative  Negative   PROTEIN UA Negative mg/dl  Negative   GLUCOSE UA Negative mg/dl  Negative   KETONES UA Negative mg/dl  Negative   UROBILINOGEN UA 0 2 E U /dl  0 2, 1 0 E U /dl   BILIRUBIN UA Negative  Negative   BLOOD UA Small A Negative, Trace-Intact   SPECIFIC GRAVITY UA <=1 005  1 003-1 030   BACTERIA None Seen /hpf  None Seen, Occasional   EPITHELIAL CELLS Occasional /hpf  None Seen, Occasional   RBC UA None Seen /hpf  None Seen, 0-5   WBC UA 1-2 /hpf A None Seen, 0-5, 5-55, 5-65       Plan  Frequency of urination and polyuria, Hematuria    · [facility]; Status:Hold For - Scheduling; Requested for:[id number];    · 1 Sadia Hidalgo MD, Hilda Madera  (Urology) Co-Management  *  Status: Active  Requested for:  [id number]  Care Summary provided  : Yes

## 2018-01-24 VITALS
TEMPERATURE: 98.1 F | DIASTOLIC BLOOD PRESSURE: 76 MMHG | SYSTOLIC BLOOD PRESSURE: 120 MMHG | HEIGHT: 69 IN | RESPIRATION RATE: 18 BRPM | WEIGHT: 131 LBS | HEART RATE: 88 BPM | BODY MASS INDEX: 19.4 KG/M2

## 2018-03-05 ENCOUNTER — OFFICE VISIT (OUTPATIENT)
Dept: PAIN MEDICINE | Facility: MEDICAL CENTER | Age: 43
End: 2018-03-05
Payer: COMMERCIAL

## 2018-03-05 VITALS — WEIGHT: 129 LBS | BODY MASS INDEX: 19.05 KG/M2 | SYSTOLIC BLOOD PRESSURE: 130 MMHG | DIASTOLIC BLOOD PRESSURE: 80 MMHG

## 2018-03-05 DIAGNOSIS — M79.18 CERVICAL MYOFASCIAL PAIN SYNDROME: ICD-10-CM

## 2018-03-05 DIAGNOSIS — M47.812 SPONDYLOSIS OF CERVICAL REGION WITHOUT MYELOPATHY OR RADICULOPATHY: ICD-10-CM

## 2018-03-05 DIAGNOSIS — M54.2 NECK PAIN: Primary | ICD-10-CM

## 2018-03-05 PROCEDURE — 99213 OFFICE O/P EST LOW 20 MIN: CPT | Performed by: NURSE PRACTITIONER

## 2018-03-05 RX ORDER — DULOXETIN HYDROCHLORIDE 30 MG/1
60 CAPSULE, DELAYED RELEASE ORAL DAILY
Qty: 60 CAPSULE | Refills: 1 | Status: SHIPPED | OUTPATIENT
Start: 2018-03-05 | End: 2018-04-25 | Stop reason: SDUPTHER

## 2018-03-05 RX ORDER — DICLOFENAC SODIUM 1 MG/ML
SOLUTION/ DROPS OPHTHALMIC
COMMUNITY
End: 2018-06-20

## 2018-03-05 RX ORDER — DULOXETIN HYDROCHLORIDE 30 MG/1
30 CAPSULE, DELAYED RELEASE ORAL
COMMUNITY
Start: 2018-01-09 | End: 2018-03-05 | Stop reason: SDUPTHER

## 2018-03-05 RX ORDER — CHLORZOXAZONE 500 MG/1
500 TABLET ORAL
COMMUNITY
Start: 2017-11-14 | End: 2018-03-05 | Stop reason: ALTCHOICE

## 2018-03-05 RX ORDER — FAMOTIDINE 20 MG/1
20 TABLET, FILM COATED ORAL
Refills: 0 | COMMUNITY
Start: 2017-12-04 | End: 2018-06-20

## 2018-03-05 RX ORDER — TIZANIDINE HYDROCHLORIDE 2 MG/1
CAPSULE, GELATIN COATED ORAL
Qty: 30 CAPSULE | Refills: 1 | Status: SHIPPED | OUTPATIENT
Start: 2018-03-05 | End: 2018-04-25 | Stop reason: SDUPTHER

## 2018-03-05 NOTE — PROGRESS NOTES
Pt is c/o neck pain that travels to his left shoulder  Assessment:  1  Neck pain    2  Cervical myofascial pain syndrome    3  Spondylosis of cervical region without myelopathy or radiculopathy        Plan: At this time, I would like to continue with the Cymbalta, an increase to 60 milligrams to try and obtain better pain relief  This was refilled today  He can trial tizanidine 2 milligrams at bedtime, he did try chlorzoxazone with no relief  I did encourage the patient to use over-the-counter Lidocaine products such as a lidocaine patch for 12 hours, or lidocaine cream up to 4 times daily  Follow-up in 6 weeks for re-evaluation    [place] Prescription Drug Monitoring Program report was reviewed and was appropriate       History of Present Illness: The patient is a 44 y o  male who presents for a follow up office visit in regards to Neck Pain  The patients current symptoms include neck pain rated 8/10, this is constant in nature most bothersome in the morning in the evening  He describes his pain as burning, sharp, pressure-like, and numbness  Cymbalta 30 milligrams daily with nausea as his only side effect  Patient is scheduled to have an EMG per his neurologist next month  I have personally reviewed and/or updated the patient's past medical history, past surgical history, family history, social history, current medications, allergies, and vital signs today  Review of Systems  Review of Systems   Respiratory: Negative for shortness of breath  Cardiovascular: Negative for chest pain  Gastrointestinal: Negative for constipation, diarrhea, nausea and vomiting  Musculoskeletal: Negative for arthralgias, gait problem, joint swelling and myalgias  Difficulty walking  Joint stiffness     Skin: Negative for rash  Neurological: Positive for dizziness  Negative for seizures and weakness  Memory loss   All other systems reviewed and are negative          Past Medical History:   Diagnosis Date    Chronic pain disorder     low back and neck    Rectal bleeding     Right lumbar radiculopathy        Past Surgical History:   Procedure Laterality Date    KNEE ARTHROSCOPY Left     knee    CO COLONOSCOPY FLX DX W/COLLJ SPEC WHEN PFRMD N/A 5/2/2017    Procedure: COLONOSCOPY with polypectomies;  Surgeon: Troy Koenig MD;  Location: [facility]; Service: General       Family History   Mother    1  Family history of diabetes mellitus (V18 0) (Z83 3)   2  Family history of hypertension (V17 49) (Z82 49)   3  Family history of lung cancer (V16 1) (Z80 1)  Father    4  No pertinent family history    Social History     Occupational History    Not on file  Social History Main Topics    Smoking status: Never Smoker    Smokeless tobacco: Never Used    Alcohol use Yes      Comment: socially    Drug use: Yes     Types: Marijuana      Comment: daily    Sexual activity: Not on file         Current Outpatient Prescriptions:     diclofenac (VOLTAREN) 0 1 % ophthalmic solution, Apply topically, Disp: , Rfl:     DULoxetine (CYMBALTA) 30 mg delayed release capsule, Take 2 capsules (60 mg total) by mouth daily, Disp: 60 capsule, Rfl: 1    famotidine (PEPCID) 20 mg tablet, Take 20 mg by mouth daily at bedtime, Disp: , Rfl: 0    naproxen (NAPROSYN) 500 mg tablet, Take 500 mg by mouth 2 (two) times a day with meals, Disp: , Rfl:     TiZANidine (ZANAFLEX) 2 MG capsule, Use one tablet at bedtime, Disp: 30 capsule, Rfl: 1    No Known Allergies    Physical Exam:    /80   Wt 58 5 kg (129 lb)   BMI 19 05 kg/m²     Constitutional:normal, well developed, well nourished, alert, in no distress and non-toxic and no overt pain behavior    Eyes:anicteric  HEENT:grossly intact  Neck:supple, symmetric, trachea midline and no masses   Pulmonary:even and unlabored  Cardiovascular:No edema or pitting edema present  Skin:Normal without rashes or lesions and well hydrated  Psychiatric:Mood and affect appropriate  Neurologic:Cranial Nerves II-XII grossly intact  Musculoskeletal:normal   Cervical Spine Exam    Appearance:  Normal lordosis  Palpation/Tenderness:  left cervical paraspinal tenderness  right cervical paraspinal tenderness  left trapezium tenderness  right trapezium tenderness          Imaging  No orders to display     MRI cervical spine wo contrast   Status: Final result   PACS Images     Show images for MRI cervical spine wo contrast   Order Report      Order Details   Study Result     MRI CERVICAL SPINE WITHOUT CONTRAST     INDICATION:  81-year-old male, posterior lower neck pain  COMPARISON:  None      TECHNIQUE:  Sagittal T1, sagittal T2, sagittal inversion recovery, axial T2, axial  2D merge          IMAGE QUALITY:  Diagnostic     FINDINGS:     ALIGNMENT:  Normal alignment of the cervical spine  No compression fracture  No subluxation  No scoliosis      MARROW SIGNAL:  Normal marrow signal is identified within the visualized bony structures  No discrete marrow lesion      CERVICAL AND VISUALIZED THORACIC CORD:  Minimally prominent central canal of the spinal cord extends from approximately C3-C7, likely developmental   The degree of prominence is not sufficient to be considered typical syrinx    3 month MRI follow-up   recommended performed without and with IV contrast      PREVERTEBRAL AND PARASPINAL SOFT TISSUES:  Prevertebral and paraspinal soft tissues are unremarkable      VISUALIZED POSTERIOR FOSSA:  The visualized posterior fossa demonstrates no abnormal signal      CERVICAL DISC SPACES:          C2-C3:  Normal      C3-C4:  Normal      C4-C5:  Minimal degenerative disc and facet disease, no stenosis     C5-C6:  Minimal degenerative disc and facet disease, no stenosis     C6-C7:  Minimal degenerative disc and facet disease, no stenosis     C7-T1:  Normal      UPPER THORACIC DISC SPACES:  Normal      IMPRESSION:  Minimal multilevel degenerative spondylosis     No evidence of disc herniation, central canal or foraminal stenosis     Mild multilevel prominence of the central canal of the cervical spinal cord, likely developmental   MRI follow-up recommended as discussed above        ##sigslh##sigslh       No orders of the defined types were placed in this encounter

## 2018-04-25 ENCOUNTER — OFFICE VISIT (OUTPATIENT)
Dept: PAIN MEDICINE | Facility: MEDICAL CENTER | Age: 43
End: 2018-04-25
Payer: COMMERCIAL

## 2018-04-25 VITALS
RESPIRATION RATE: 12 BRPM | HEIGHT: 69 IN | WEIGHT: 135.4 LBS | HEART RATE: 68 BPM | SYSTOLIC BLOOD PRESSURE: 106 MMHG | BODY MASS INDEX: 20.06 KG/M2 | DIASTOLIC BLOOD PRESSURE: 70 MMHG

## 2018-04-25 DIAGNOSIS — M54.2 NECK PAIN: ICD-10-CM

## 2018-04-25 DIAGNOSIS — M79.18 CERVICAL MYOFASCIAL PAIN SYNDROME: ICD-10-CM

## 2018-04-25 DIAGNOSIS — M47.812 SPONDYLOSIS OF CERVICAL REGION WITHOUT MYELOPATHY OR RADICULOPATHY: Primary | ICD-10-CM

## 2018-04-25 PROCEDURE — 99213 OFFICE O/P EST LOW 20 MIN: CPT | Performed by: NURSE PRACTITIONER

## 2018-04-25 RX ORDER — TIZANIDINE HYDROCHLORIDE 2 MG/1
2 CAPSULE, GELATIN COATED ORAL 3 TIMES DAILY
Qty: 90 CAPSULE | Refills: 1 | Status: SHIPPED | OUTPATIENT
Start: 2018-04-25 | End: 2018-06-20 | Stop reason: SDUPTHER

## 2018-04-25 RX ORDER — DULOXETIN HYDROCHLORIDE 30 MG/1
60 CAPSULE, DELAYED RELEASE ORAL DAILY
Qty: 60 CAPSULE | Refills: 1 | Status: SHIPPED | OUTPATIENT
Start: 2018-04-25 | End: 2018-06-20 | Stop reason: SDDI

## 2018-04-25 NOTE — PROGRESS NOTES
Assessment:  1  Spondylosis of cervical region without myelopathy or radiculopathy    2  Neck pain    3  Cervical myofascial pain syndrome        Plan: At this time, the patient can continue with the Cymbalta at as prescribed  This was refilled today  I will increase his tizanidine to 3 times daily this should help with the stiffness he is experiencing in his neck  This was also refilled today  I would like him to [facility] or twice a week for 4-6 weeks they will include massage therapy for myofascial release and hopefully improve his cervical range of motion  Follow-up visit in 8 weeks for re-evaluation      [place] Prescription Drug Monitoring Program report was reviewed and was appropriate         My impressions and treatment recommendations were discussed in detail with the patient who verbalized understanding and had no further questions  Discharge instructions were provided  I personally saw and examined the patient and I agree with the above discussed plan of care  Orders Placed This Encounter   Procedures    Ambulatory referral to Physical Therapy     Standing Status:   Future     Standing Expiration Date:   4/25/2019     Referral Priority:   Routine     Referral Type:   Physical Therapy     Referral Reason:   Specialty Services Required     Requested Specialty:   Physical Therapy     Number of Visits Requested:   1     Expiration Date:   4/25/2019     New Medications Ordered This Visit   Medications    DULoxetine (CYMBALTA) 30 mg delayed release capsule     Sig: Take 2 capsules (60 mg total) by mouth daily     Dispense:  60 capsule     Refill:  1    TiZANidine (ZANAFLEX) 2 MG capsule     Sig: Take 1 capsule (2 mg total) by mouth 3 (three) times a day Use one tablet at bedtime     Dispense:  90 capsule     Refill:  1       History of Present Illness:    Dae Iqbal is a 44 y o  male who presents for follow-up office visit related to his neck pain  Patient has been taking Cymbalta 60 mg 1 tablet daily along with tizanidine 2 mg 1 tablet at bedtime  He states that he does get nauseous from the Cymbalta however he is able to balance this out by eating more  Patient was to have EMG study per his neurologist, however he states that these were canceled and rescheduled and they can't get him in until July  I have personally reviewed and/or updated the patient's past medical history, past surgical history, family history, social history, current medications, allergies, and vital signs today  Review of Systems:    Review of Systems   Respiratory: Negative for shortness of breath  Cardiovascular: Positive for chest pain  Gastrointestinal: Positive for nausea  Negative for constipation, diarrhea and vomiting  Musculoskeletal: Positive for joint swelling, myalgias, neck pain and neck stiffness  Negative for arthralgias and gait problem  Skin: Negative for rash  Neurological: Positive for weakness  Negative for dizziness and seizures  All other systems reviewed and are negative  Patient Active Problem List   Diagnosis    Neck pain    Cervical myofascial pain syndrome    Spondylosis of cervical region without myelopathy or radiculopathy       Past Medical History:   Diagnosis Date    Chronic pain disorder     low back and neck    Rectal bleeding     Right lumbar radiculopathy        Past Surgical History:   Procedure Laterality Date    KNEE ARTHROSCOPY Left     knee    ND COLONOSCOPY FLX DX W/COLLJ SPEC WHEN PFRMD N/A 5/2/2017    Procedure: COLONOSCOPY with polypectomies;  Surgeon: Jarvis Demarco MD;  Location: [facility]; Service: General       History reviewed  No pertinent family history  Social History     Occupational History    Not on file       Social History Main Topics    Smoking status: Never Smoker    Smokeless tobacco: Never Used    Alcohol use Yes      Comment: socially    Drug use: Yes     Types: Marijuana Comment: daily    Sexual activity: Not on file       Current Outpatient Prescriptions on File Prior to Visit   Medication Sig    diclofenac (VOLTAREN) 0 1 % ophthalmic solution Apply topically    famotidine (PEPCID) 20 mg tablet Take 20 mg by mouth daily at bedtime    [DISCONTINUED] DULoxetine (CYMBALTA) 30 mg delayed release capsule Take 2 capsules (60 mg total) by mouth daily    [DISCONTINUED] TiZANidine (ZANAFLEX) 2 MG capsule Use one tablet at bedtime    [DISCONTINUED] naproxen (NAPROSYN) 500 mg tablet Take 500 mg by mouth 2 (two) times a day with meals     No current facility-administered medications on file prior to visit  Allergies   Allergen Reactions    Pregabalin      Depression       Physical Exam:    /70   Pulse 68   Resp 12   Ht 5' 9" (1 753 m)   Wt 61 4 kg (135 lb 6 4 oz)   BMI 20 00 kg/m²     Constitutional: normal, well developed, well nourished, alert, in no distress and non-toxic and no overt pain behavior    Eyes: anicteric  HEENT: grossly intact  Neck: supple, symmetric, trachea midline and no masses   Pulmonary:even and unlabored  Cardiovascular:No edema or pitting edema present  Skin:Normal without rashes or lesions and well hydrated  Psychiatric:Mood and affect appropriate  Neurologic:Cranial Nerves II-XII grossly intact  Musculoskeletal:normal   Cervical Spine Exam    Appearance:  Normal lordosis  Palpation/Tenderness:  left cervical paraspinal tenderness  right cervical paraspinal tenderness  left trapezium tenderness  right trapezium tenderness  Range of Motion:  Flexion:  No limitation  with pain  Extension:  Minimally limited  with pain  Lateral Flexion - Left:  Minimally limited  with pain  Lateral Flexion - Right:  Minimally limited  with pain  Rotation - Left:  Minimally limited  with pain  Rotation - Right:  Minimally limited  with pain  Motor Strength:  Left Arm Flexion  5/5  Left Arm Extension  5/5  Right Arm Flexion  5/5  Right Arm Extension  5/5  Left Wrist Flexion  5/5  Left Wrist Extension  5/5  Left Finger Abduction  5/5  Right Finger Abduction  5/5  Left Pincer Grasp  5/5  Right Pincer Grasp  5/5  Left    5/5  Right   5/5          Imaging

## 2018-05-07 ENCOUNTER — TELEPHONE (OUTPATIENT)
Dept: PAIN MEDICINE | Facility: MEDICAL CENTER | Age: 43
End: 2018-05-07

## 2018-05-07 NOTE — TELEPHONE ENCOUNTER
Pt's girlfriend Andrae Nipple) stating he was referred to [facility] by [facility] and they don't accept his insurance  Please call [phone]

## 2018-05-07 NOTE — TELEPHONE ENCOUNTER
Aware TY   Patient can try and bring that script to a [facility] facility and they should follow  The instructions provided on the script to use massage for myofascial release

## 2018-05-07 NOTE — TELEPHONE ENCOUNTER
Rn attempted to reach pt or pt's girlfriend regarding previous  RN left a detailed VMMOM of César's cell phone as per release of health information on chart  c/b number office hours and location provided as well as central scheduling ph# for PT of [phone]

## 2018-05-09 ENCOUNTER — EVALUATION (OUTPATIENT)
Dept: PHYSICAL THERAPY | Facility: CLINIC | Age: 43
End: 2018-05-09
Payer: COMMERCIAL

## 2018-05-09 DIAGNOSIS — M54.2 NECK PAIN: ICD-10-CM

## 2018-05-09 DIAGNOSIS — M79.18 CERVICAL MYOFASCIAL PAIN SYNDROME: ICD-10-CM

## 2018-05-09 DIAGNOSIS — M47.812 SPONDYLOSIS OF CERVICAL REGION WITHOUT MYELOPATHY OR RADICULOPATHY: Primary | ICD-10-CM

## 2018-05-09 PROCEDURE — 97140 MANUAL THERAPY 1/> REGIONS: CPT | Performed by: PHYSICAL THERAPIST

## 2018-05-09 PROCEDURE — 97162 PT EVAL MOD COMPLEX 30 MIN: CPT | Performed by: PHYSICAL THERAPIST

## 2018-05-09 PROCEDURE — G8991 OTHER PT/OT GOAL STATUS: HCPCS | Performed by: PHYSICAL THERAPIST

## 2018-05-09 PROCEDURE — G8990 OTHER PT/OT CURRENT STATUS: HCPCS | Performed by: PHYSICAL THERAPIST
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
## 2018-05-11 ENCOUNTER — OFFICE VISIT (OUTPATIENT)
Dept: PHYSICAL THERAPY | Facility: CLINIC | Age: 43
End: 2018-05-11
Payer: COMMERCIAL

## 2018-05-11 DIAGNOSIS — M54.2 NECK PAIN: ICD-10-CM

## 2018-05-11 DIAGNOSIS — M79.18 CERVICAL MYOFASCIAL PAIN SYNDROME: ICD-10-CM

## 2018-05-11 DIAGNOSIS — M47.812 SPONDYLOSIS OF CERVICAL REGION WITHOUT MYELOPATHY OR RADICULOPATHY: Primary | ICD-10-CM

## 2018-05-11 PROCEDURE — 97110 THERAPEUTIC EXERCISES: CPT

## 2018-05-11 PROCEDURE — 97150 GROUP THERAPEUTIC PROCEDURES: CPT

## 2018-05-11 PROCEDURE — 97140 MANUAL THERAPY 1/> REGIONS: CPT

## 2018-05-14 ENCOUNTER — OFFICE VISIT (OUTPATIENT)
Dept: PHYSICAL THERAPY | Facility: CLINIC | Age: 43
End: 2018-05-14
Payer: COMMERCIAL

## 2018-05-14 DIAGNOSIS — M47.812 SPONDYLOSIS OF CERVICAL REGION WITHOUT MYELOPATHY OR RADICULOPATHY: Primary | ICD-10-CM

## 2018-05-14 DIAGNOSIS — M79.18 CERVICAL MYOFASCIAL PAIN SYNDROME: ICD-10-CM

## 2018-05-14 DIAGNOSIS — M54.2 NECK PAIN: ICD-10-CM

## 2018-05-14 PROCEDURE — 97140 MANUAL THERAPY 1/> REGIONS: CPT

## 2018-05-14 PROCEDURE — 97110 THERAPEUTIC EXERCISES: CPT

## 2018-05-14 NOTE — PROGRESS NOTES
Daily Note     Today's date: 2018  Patient name: Philip Wyman  : 1975  MRN: [id number]  Referring provider: LANA Clayton  Dx:   Encounter Diagnosis     ICD-10-CM    1  Spondylosis of cervical region without myelopathy or radiculopathy M47 812    2  Neck pain M54 2    3  Cervical myofascial pain syndrome M79 1                   Subjective: Pt presents to PT reporting no pain only "stiffness"  Pt reports temporary relief from stiffness and pain post last visit "for a couple of hours"  Pt reports no changes in L UE sensation or feeling of heaviness  Pt reports fatigue in CS after performing wall slides  Pt reports "a lot less stiffness" post PT session  Objective: See treatment diary below    Precautions: arthritis, back pain, depression, headaches, sleep dysfunction    Daily Treatment Diary     Manual  18          Myofascial release to B suboccipitals, cervical paraspinals, levator 10 min PK PK          L UE nerve glides nv np PK          SOR nv PK PK          Cspine side glides PRN nv np np          BP in seated nv NV L UE/seated  118/78              Exercise Diary  18          UBE standing nv 4 mins bkwrd 4 mins bkwrd          pulleys nv 3 mins 3 mins          Cervical AROM nv 5" x 5 ea 5" x 10 ea          UE wall slides nv 5" x 10 5" x 10          Scapular retraction nv X 15 15x           TB ER w/ scap retraction nv Peach  3" x 10 Peach  3" x 15          Tspine extension over chair nv 5" x 10 5" x 10          Chin tucks in supine nv  3" x 10                                                                                                                                                                          Modalities  18          MH PRN nv declined declined                                          Assessment: Pt reports sensitivity at pain of CS but dissipates and manual therapy progresses    Pt demonstrates good tolerance to progressions with no complaints of increased pain  Plan: Continue per plan of care

## 2018-05-16 ENCOUNTER — OFFICE VISIT (OUTPATIENT)
Dept: PHYSICAL THERAPY | Facility: CLINIC | Age: 43
End: 2018-05-16
Payer: COMMERCIAL

## 2018-05-16 DIAGNOSIS — M47.812 SPONDYLOSIS OF CERVICAL REGION WITHOUT MYELOPATHY OR RADICULOPATHY: Primary | ICD-10-CM

## 2018-05-16 DIAGNOSIS — M54.2 NECK PAIN: ICD-10-CM

## 2018-05-16 DIAGNOSIS — M79.18 CERVICAL MYOFASCIAL PAIN SYNDROME: ICD-10-CM

## 2018-05-16 PROCEDURE — 97110 THERAPEUTIC EXERCISES: CPT | Performed by: PHYSICAL THERAPIST

## 2018-05-16 PROCEDURE — 97140 MANUAL THERAPY 1/> REGIONS: CPT | Performed by: PHYSICAL THERAPIST

## 2018-05-16 NOTE — PROGRESS NOTES
Daily Note     Today's date: 2018  Patient name: Kirt Barbosa  : 1975  MRN: [id number]  Referring provider: LANA Beckford  Dx:   Encounter Diagnosis     ICD-10-CM    1  Spondylosis of cervical region without myelopathy or radiculopathy M47 812    2  Neck pain M54 2    3  Cervical myofascial pain syndrome M79 1                   Subjective: Pt notes significant stiffness in bilateral scapulae and neck today, stating "I don't know how much I'm going to be able to do today " Pt rates pain 5/10  Objective: See treatment diary below    Precautions: arthritis, back pain, depression, headaches, sleep dysfunction    Daily Treatment Diary     Manual  18         Myofascial release to B suboccipitals, cervical paraspinals, levator 10 min PK PK ED         L UE nerve glides nv np PK nv         SOR nv PK PK nv         Cspine side glides PRN nv np np PA gr 1-2 in prone, ED         IASTM handlebar L scap lift    ED         BP in seated nv NV L UE/seated  118/78 L UE/ seated 113/69             Exercise Diary  18         UBE standing nv 4 mins bkwrd 4 mins bkwrd 4 min bwd         pulleys nv 3 mins 3 mins 3 min         Cervical AROM nv 5" x 5 ea 5" x 10 ea 5"x10 ea         UE wall slides nv 5" x 10 5" x 10          Scapular retraction nv X 15 15x           TB ER w/ scap retraction nv Peach  3" x 10 Peach  3" x 15          Tspine extension over chair nv 5" x 10 5" x 10 5"x10         Chin tucks in supine nv  3" x 10          Corner pec stretch    nv                                                                                                                                                            Modalities  18         MH PRN nv declined declined declined                                         Assessment: Tolerated treatment well   Patient would benefit from continued PT Pt demonstrates hypomobility throughout cervical and thoracic spine, fair tolerance to PA mobs in prone  Pt has significant soft tissue restrictions in cervical paraspinals and L periscapular muscles  Pt had good tolerance to initiation of IASTM to L scapula with handlebars with decreased trigger points noted afterward  Pt demonstrates improved mobility and fluidity of motion following manuals  Pt rates stiffness reduced to 2/10, "stabbing/burning" pain at CT junction reduced to 3/10  Plan: Continue per plan of care  Progress treatment as tolerated  with plan to resume full treatment session per pt tolerance

## 2018-05-21 ENCOUNTER — OFFICE VISIT (OUTPATIENT)
Dept: PHYSICAL THERAPY | Facility: CLINIC | Age: 43
End: 2018-05-21
Payer: COMMERCIAL

## 2018-05-21 DIAGNOSIS — M79.18 CERVICAL MYOFASCIAL PAIN SYNDROME: ICD-10-CM

## 2018-05-21 DIAGNOSIS — M54.2 NECK PAIN: ICD-10-CM

## 2018-05-21 DIAGNOSIS — M47.812 SPONDYLOSIS OF CERVICAL REGION WITHOUT MYELOPATHY OR RADICULOPATHY: Primary | ICD-10-CM

## 2018-05-21 PROCEDURE — 97110 THERAPEUTIC EXERCISES: CPT | Performed by: PHYSICAL THERAPIST

## 2018-05-21 PROCEDURE — 97140 MANUAL THERAPY 1/> REGIONS: CPT | Performed by: PHYSICAL THERAPIST

## 2018-05-21 NOTE — PROGRESS NOTES
Daily Note     Today's date: 2018  Patient name: Akanksha West  : 1975  MRN: [id number]  Referring provider: LANA Phelan  Dx:   Encounter Diagnosis     ICD-10-CM    1  Spondylosis of cervical region without myelopathy or radiculopathy M47 812    2  Neck pain M54 2    3  Cervical myofascial pain syndrome M79 1                   Subjective: Pt reports feeling increased pressure in his eardrums since last visit, denies changes in sinus pressure  Pt states that shoulder blade felt good following last visit but continues to note stiffness throughout cervical spine  Pt rates cervical pain 3/10 at start of session, reporting increase to 6/10 with active extension  Pt has EMG scheduled for this Thursday, 18        Objective: See treatment diary below    Precautions: arthritis, back pain, depression, headaches, sleep dysfunction    Daily Treatment Diary     Manual  18        Myofascial release to B suboccipitals, cervical paraspinals, levator 10 min PK PK ED ED        L UE nerve glides nv np PK nv ED        SOR nv PK PK nv ED        Cspine side glides PRN nv np np PA gr 1-2 in prone, ED ED        IASTM handlebar L scap lift    ED ED        BP in seated nv NV L UE/seated  118/78 L UE/ seated 113/69 np            Exercise Diary  18        UBE standing nv 4 mins bkwrd 4 mins bkwrd 4 min bwd 5 min bwd        pulleys nv 3 mins 3 mins 3 min 4 min        Cervical AROM nv 5" x 5 ea 5" x 10 ea 5"x10 ea 5"x10 ea        UE wall slides nv 5" x 10 5" x 10  5"x10        Scapular retraction nv X 15 15x   3"x20        TB ER w/ scap retraction nv Peach  3" x 10 Peach  3" x 15  peach        Tspine extension over chair nv 5" x 10 5" x 10 5"x10 5"x15        Chin tucks in supine nv  3" x 10  nv        Corner pec stretch    nv 15"x5 Modalities  5/9/18 5/11/18 5/14/18 5/16/18 5/21/18        MH PRN nv declined declined declined declined                                          Assessment: Tolerated treatment well  Patient demonstrated fatigue post treatment and would benefit from continued PT Pt requires cuing with cervical AROM to avoid extension with R rotation with pt demonstrating improved tolerance and ROM with this correction  Pt continues to have significant soft tissue restrictions in cervical paraspinals, L > R, and along articular pillar  Pt requires cuing to correct UE wall slides to maintain posture  Pt reports reduced pain to 1-2/10 at end of session  Plan: Continue per plan of care  Progress treatment as tolerated

## 2018-05-30 ENCOUNTER — OFFICE VISIT (OUTPATIENT)
Dept: PHYSICAL THERAPY | Facility: CLINIC | Age: 43
End: 2018-05-30
Payer: COMMERCIAL

## 2018-05-30 DIAGNOSIS — M79.18 CERVICAL MYOFASCIAL PAIN SYNDROME: ICD-10-CM

## 2018-05-30 DIAGNOSIS — M54.2 NECK PAIN: ICD-10-CM

## 2018-05-30 DIAGNOSIS — M47.812 SPONDYLOSIS OF CERVICAL REGION WITHOUT MYELOPATHY OR RADICULOPATHY: Primary | ICD-10-CM

## 2018-05-30 PROCEDURE — 97110 THERAPEUTIC EXERCISES: CPT

## 2018-05-30 PROCEDURE — 97140 MANUAL THERAPY 1/> REGIONS: CPT

## 2018-05-30 NOTE — PROGRESS NOTES
Daily Note     Today's date: 2018  Patient name: Lida Dunne  : 1975  MRN: [id number]  Referring provider: LANA Swift  Dx:   Encounter Diagnosis     ICD-10-CM    1  Spondylosis of cervical region without myelopathy or radiculopathy M47 812    2  Neck pain M54 2    3  Cervical myofascial pain syndrome M79 1                   Subjective: Pt presents to pt reporting EMG was performed and will get results w/ f/u on 18  Pt grades his pain a 4/10 in lower CS stating "the ball"  Pt reports decreased stiffness and pain grading the pain a 3/10          Objective: See treatment diary below    Precautions: arthritis, back pain, depression, headaches, sleep dysfunction    Daily Treatment Diary     Manual  18       Myofascial release to B suboccipitals, cervical paraspinals, levator 10 min PK PK ED ED PK       L UE nerve glides nv np PK nv ED PK       SOR nv PK PK nv ED nv       Cspine side glides PRN nv np np PA gr 1-2 in prone, ED ED np       IASTM handlebar L scap lift    ED ED PK       BP in seated nv NV L UE/seated  118/78 L UE/ seated 113/69 np np           Exercise Diary  18       UBE standing nv 4 mins bkwrd 4 mins bkwrd 4 min bwd 5 min bwd 5 min bwd       pulleys nv 3 mins 3 mins 3 min 4 min 5 min       Cervical AROM nv 5" x 5 ea 5" x 10 ea 5"x10 ea 5"x10 ea 5"x10 ea       UE wall slides nv 5" x 10 5" x 10  5"x10 5"x10       Scapular retraction nv X 15 15x   3"x20 3"x20       TB ER w/ scap retraction nv Pe[place]  3" x 10 Peach  3" x 15  peach Peach  3" x 15       Tspine extension over chair nv 5" x 10 5" x 10 5"x10 5"x15 5"x15       Chin tucks in supine nv  3" x 10  nv 3" x 10       Corner pec stretch    nv 15"x5 30" x 3                                                                                                                                                          Modalities  18 5/14/18 5/16/18 5/21/18 5/30/18       MH PRN nv declined declined declined declined declined                                         Assessment: Pt tolerated progressions well with no complaint  He required occ suing for correct technique  Patient demonstrated fatigue post treatment and would benefit from continued PT  Plan: Continue per plan of care  Progress treatment as tolerated

## 2018-06-04 ENCOUNTER — OFFICE VISIT (OUTPATIENT)
Dept: PHYSICAL THERAPY | Facility: CLINIC | Age: 43
End: 2018-06-04
Payer: COMMERCIAL

## 2018-06-04 DIAGNOSIS — M54.2 NECK PAIN: ICD-10-CM

## 2018-06-04 DIAGNOSIS — M79.18 CERVICAL MYOFASCIAL PAIN SYNDROME: ICD-10-CM

## 2018-06-04 DIAGNOSIS — M47.812 SPONDYLOSIS OF CERVICAL REGION WITHOUT MYELOPATHY OR RADICULOPATHY: Primary | ICD-10-CM

## 2018-06-04 PROCEDURE — 97110 THERAPEUTIC EXERCISES: CPT

## 2018-06-04 PROCEDURE — 97140 MANUAL THERAPY 1/> REGIONS: CPT

## 2018-06-04 NOTE — PROGRESS NOTES
Daily Note     Today's date: 2018  Patient name: Julisa Montano  : 1975  MRN: [id number]  Referring provider: LANA Gallego  Dx:   No diagnosis found  Subjective: Pt presents to pt reporting 6/10 burning pain in CS and L UT  He states he was grilling yesterday for a family picnic which may be the reason for the increased soreness  Pt reports decreased pain and increased mobility post PT session grading the pain a 4/10 and continues to describe it as burning        Objective: See treatment diary below    Precautions: arthritis, back pain, depression, headaches, sleep dysfunction    Daily Treatment Diary     Manual  18      Myofascial release to B suboccipitals, cervical paraspinals, levator 10 min PK PK ED ED PK PK      L UE nerve glides nv np PK nv ED PK np      SOR nv PK PK nv ED nv PK      Cspine side glides PRN nv np np PA gr 1-2 in prone, ED ED np np      IASTM handlebar L scap lift    ED ED PK PK      BP in seated nv NV L UE/seated  118/78 L UE/ seated 113/69 np np NP          Exercise Diary  18      UBE standing nv 4 mins bkwrd 4 mins bkwrd 4 min bwd 5 min bwd 5 min bwd 5 min bwd      pulleys nv 3 mins 3 mins 3 min 4 min 5 min 5 min      Cervical AROM nv 5" x 5 ea 5" x 10 ea 5"x10 ea 5"x10 ea 5"x10 ea 5"x10 ea      UE wall slides nv 5" x 10 5" x 10  5"x10 5"x10 5" x10      Scapular retraction nv X 15 15x   3"x20 3"x20 3"x20      TB ER w/ scap retraction nv Peach  3" x 10 Peach  3" x 15  peach Peach  3" x 15 Org  3" x 15      Tspine extension over chair nv 5" x 10 5" x 10 5"x10 5"x15 5"x15 5"x15      Chin tucks in supine nv  3" x 10  nv 3" x 10 3" x 10      Corner pec stretch    nv 15"x5 30" x 3 30" x 3                                                                                                                                                         Modalities  18 5/11/18 5/14/18 5/16/18 5/21/18 5/30/18 6/4/18      MH PRN nv declined declined declined declined declined declined                                        Assessment: Pt tolerated treatment well with no complaint  Noted mild muscle restrictions in L UT with manual   Patient demonstrated fatigue post treatment and would benefit from continued PT  Plan: Continue per plan of care  Progress treatment as tolerated

## 2018-06-06 ENCOUNTER — EVALUATION (OUTPATIENT)
Dept: PHYSICAL THERAPY | Facility: CLINIC | Age: 43
End: 2018-06-06
Payer: COMMERCIAL

## 2018-06-06 DIAGNOSIS — M47.812 SPONDYLOSIS OF CERVICAL REGION WITHOUT MYELOPATHY OR RADICULOPATHY: Primary | ICD-10-CM

## 2018-06-06 DIAGNOSIS — M79.18 CERVICAL MYOFASCIAL PAIN SYNDROME: ICD-10-CM

## 2018-06-06 DIAGNOSIS — M54.2 NECK PAIN: ICD-10-CM

## 2018-06-06 PROCEDURE — 97140 MANUAL THERAPY 1/> REGIONS: CPT | Performed by: PHYSICAL THERAPIST

## 2018-06-06 PROCEDURE — G8991 OTHER PT/OT GOAL STATUS: HCPCS | Performed by: PHYSICAL THERAPIST

## 2018-06-06 PROCEDURE — G8990 OTHER PT/OT CURRENT STATUS: HCPCS | Performed by: PHYSICAL THERAPIST

## 2018-06-06 NOTE — PROGRESS NOTES
PT Re-Evaluation     Today's date: 2018  Patient name: Ian Green  : 1975  MRN: [id number]  Referring provider: LANA Jovel  Dx:   Encounter Diagnosis     ICD-10-CM    1  Spondylosis of cervical region without myelopathy or radiculopathy M47 812    2  Neck pain M54 2    3  Cervical myofascial pain syndrome M79 1                   Assessment  Impairments: abnormal or restricted ROM, impaired physical strength and pain with function    Assessment details: Ian Green is a 44 y o  male who presents to physical therapy with Spondylosis of cervical region without myelopathy or radiculopathy, Neck pain, Cervical myofascial pain syndrome  Pt continues to have difficulty with reaching overhead  Pt has difficulty with sitting for greater than 30 minutes, noting head and scapulae feel "heavy and tired " Pt is not driving  Pt has not tried lifting heavier than a grocery bag  Pt continues to limit activity with L hand  Pt reports increased cervical rotation since starting therapy  Pt may benefit from additional evaluation to address psychosocial aspect of pain  Pt has improved cervical AROM and L UE strength but continues to have deficits in both areas  Pt continues to demonstrate poor seated posture  Ian Green would benefit from continued formal physical therapy to address impairments as detailed, decrease pain, and restore maximal level of function for all home and mobility tasks  Thank you for this referral     Understanding of Dx/Px/POC: good   Prognosis: good    Goals  Short-term goals:  1  Pt will decrease pain by 1-2 points within 4 weeks  - met  2  Pt will improve ROM by 5-10 degrees within 4 weeks  - met  3  Pt will improve strength by 1/2 grade within 4 weeks  - met  4  Pt will improve physical FS score by 15 points by discharge  - partially met  Long-term goals:  1  Pt will demonstrate independence with HEP by discharge  - met  2   Pt will return to all home tasks with good body mechanics and pain <3/10 by discharge  - partially met  3  Pt will reach in all planes with pain <4/10 by discharge  - partially met  4  Pt will demonstrate appropriate self-correction of posture without cuing by discharge  - partially met    Plan  Patient would benefit from: skilled PT  Planned modality interventions: cryotherapy and thermotherapy: hydrocollator packs  Planned therapy interventions: joint mobilization, manual therapy, neuromuscular re-education, patient education, strengthening, stretching, therapeutic exercise, flexibility, functional ROM exercises, home exercise program, activity modification, body mechanics training and massage  Other planned therapy interventions: myofascial release  Frequency: 2x week  Duration in weeks: 4  Treatment plan discussed with: patient        Subjective Evaluation    History of Present Illness  Mechanism of injury: Pt feels about 15% improved since starting therapy  Pt reports feeling better when he leaves therapy but that pain and pressure return within 2-3 hours  Pt has been compliant with therapy attendance and performing home exercise program  Pt reports occasional radicular symptoms into L arm, describing arm as feeling "heavy " Pt states headaches have been "not too many" since starting therapy  Pt reports continued "pressure" in posterior head  Pain  At best pain ratin  At worst pain ratin  Location: L cervical spine  Quality: pressure (pinching)  Relieving factors: heat, rest and change in position  Aggravating factors: lifting and overhead activity  Progression: improved    Social Support  Lives with: significant other and adult children    Employment status: not working  Hand dominance: left      Diagnostic Tests  MRI studies: abnormal (DDD)  Abnormal EMG results: pt does not have results at this time    Patient Goals  Patient goals for therapy: increased motion  Patient goal: increased mobility- partially met        Objective     Postural Observations    Additional Postural Observation Details  [facility] posture, significant capital extension to bring head to neutral    Palpation   Left   Hypertonic in the suboccipitals  Muscle spasm in the suboccipitals and upper trapezius  Tenderness of the suboccipitals  Right   Hypertonic in the suboccipitals  Muscle spasm in the suboccipitals  Tenderness of the suboccipitals  Additional Palpation Details  "tingling, stabbing" over C-spine; persists at RE over "ball of neck" (pt indicates CT junction)    Tenderness     Additional Tenderness Details  Tender over CT junction and C7, T1 spinous processes; persists at RE    Neurological Testing     Sensation   Cervical/Thoracic   Left   Intact: light touch  Diminished: light touch    Right   Intact: light touch    Comments   Left light touch: C5-C7       Reflexes   Left   Biceps (C5/C6): normal (2+)  Brachioradialis (C6): normal (2+)  Triceps (C7): normal (2+)  Velasquez's reflex: negative    Right   Biceps (C5/C6): normal (2+)  Brachioradialis (C6): normal (2+)  Triceps (C7): normal (2+)  Velasquez's reflex: negative    Active Range of Motion   Cervical/Thoracic Spine   Cervical    Flexion: 40 (felt pulling around 20 deg) degrees with pain  Extension: 30 degrees with pain  Left lateral flexion: 35 ("cracking, kink") degrees with pain  Right lateral flexion: 40 (pulling in L UT) degrees   Left rotation: 39 ("kink") degrees   Right rotation: 53 (stretch) degrees     Additional Active Range of Motion Details  Pain on L with extension > flexion; persists at RE    Joint Play Joints within functional limits: C1 and C2   Comments: Joint mobility WNL when supported in supine, restricted with cervical flexion; pt has improved mobility at RE but continues to have pain, elevated L ribs 1-2, head positioned in extension and R lateral flexion in supine    Strength/Myotome Testing   Cervical Spine     Left   Interossei strength (t1): 4    Right   Interossei strength (t1): 5etr    Left Shoulder     Planes of Motion   Flexion: 4   Abduction: 4   External rotation at 0°: 4+   Internal rotation at 0°: 4+     Right Shoulder     Planes of Motion   Flexion: 5   Abduction: 5   External rotation at 0°: 5   Internal rotation at 0°: 4+     Left Elbow   Flexion: 4+  Extension: 4+    Right Elbow   Flexion: 5  Extension: 5    Left Wrist/Hand   Wrist extension: 5  Wrist flexion: 5    Right Wrist/Hand   Wrist extension: 5  Wrist flexion: 5    Tests   Cervical     Left   Negative alar ligament integrity and transverse ligament  Right   Negative alar ligament integrity and transverse ligament  Left Shoulder   Positive ULTT1, ULTT2, ULTT3 and ULTT4  Right Shoulder   Negative ULTT1, ULTT2, ULTT3 and ULTT4       Additional Tests Details  Better with SOR; persists at RE  Neg VBI  Median nerve significantly restricted on L; improved at RE      Flowsheet Rows      Most Recent Value   PT/OT G-Codes   Current Score  43   Projected Score  46   FOTO information reviewed  Yes   Assessment Type  Re-evaluation   G code set  Other PT/OT Primary   Other PT Primary Current Status ()  CK   Other PT Primary Goal Status ()  CK        Precautions: arthritis, back pain, depression, headaches, sleep dysfunction    Daily Treatment Diary     Manual  5/9/18 5/11/19 5/14/18 5/16/18 5/21/18 5/30/18 6/4/18 6/6/18     Myofascial release to B suboccipitals, cervical paraspinals, levator 10 min PK PK ED ED PK PK ED     L UE nerve glides nv np PK nv ED PK np nv     SOR nv PK PK nv ED nv PK ED     Cspine side glides PRN nv np np PA gr 1-2 in prone, ED ED np np np     IASTM handlebar L scap lift    ED ED PK PK manual     BP in seated nv NV L UE/seated  118/78 L UE/ seated 113/69 np np NP np     measurements        ED         Exercise Diary  5/9/18 5/11/18 5/14/18 5/16/18 5/21/18 5/30/18 6/4/18 6/6/18     UBE standing nv 4 mins bkwrd 4 mins bkwrd 4 min bwd 5 min bwd 5 min bwd 5 min bwd 5 min bwd     pulleys nv 3 mins 3 mins 3 min 4 min 5 min 5 min nv     Cervical AROM nv 5" x 5 ea 5" x 10 ea 5"x10 ea 5"x10 ea 5"x10 ea 5"x10 ea nv     UE wall slides nv 5" x 10 5" x 10  5"x10 5"x10 5" x10 nv     Scapular retraction nv X 15 15x   3"x20 3"x20 3"x20 nv     TB ER w/ scap retraction nv Peach  3" x 10 Peach  3" x 15  peach [place]ach  3" x 15 Org  3" x 15 nv     Tspine extension over chair nv 5" x 10 5" x 10 5"x10 5"x15 5"x15 5"x15 nv     Chin tucks in supine nv  3" x 10  nv 3" x 10 3" x 10 nv     Corner pec stretch    nv 15"x5 30" x 3 30" x 3 nv                                                                                                                                                        Modalities  5/9/18 5/11/18 5/14/18 5/16/18 5/21/18 5/30/18 6/4/18 6/6/18      PRN nv declined declined declined declined declined declined np

## 2018-06-11 ENCOUNTER — OFFICE VISIT (OUTPATIENT)
Dept: PHYSICAL THERAPY | Facility: CLINIC | Age: 43
End: 2018-06-11
Payer: COMMERCIAL

## 2018-06-11 DIAGNOSIS — M54.2 NECK PAIN: ICD-10-CM

## 2018-06-11 DIAGNOSIS — M47.812 SPONDYLOSIS OF CERVICAL REGION WITHOUT MYELOPATHY OR RADICULOPATHY: Primary | ICD-10-CM

## 2018-06-11 DIAGNOSIS — M79.18 CERVICAL MYOFASCIAL PAIN SYNDROME: ICD-10-CM

## 2018-06-11 PROCEDURE — 97140 MANUAL THERAPY 1/> REGIONS: CPT

## 2018-06-11 PROCEDURE — 97110 THERAPEUTIC EXERCISES: CPT

## 2018-06-11 NOTE — PROGRESS NOTES
Daily Note     Today's date: 2018  Patient name: Carroll Mora  : 1975  MRN: [id number]  Referring provider: LANA Quintanilla  Dx:   Encounter Diagnosis     ICD-10-CM    1  Spondylosis of cervical region without myelopathy or radiculopathy M47 812    2  Neck pain M54 2    3  Cervical myofascial pain syndrome M79 1                   Subjective: Pt presents to PT reporting pressure and mild pain in lower CS grading the pain a 3/10  He reports pain with CS ext  He also reports "numbness" in L UE and reports occ tingling  He also reports L UE "feels heavy"          Objective: See treatment diary below    Precautions: arthritis, back pain, depression, headaches, sleep dysfunction    Daily Treatment Diary     Manual  18    Myofascial release to B suboccipitals, cervical paraspinals, levator 10 min PK PK ED ED PK PK ED PK    L UE nerve glides nv np PK nv ED PK np nv PK    SOR nv PK PK nv ED nv PK ED PK    Cspine side glides PRN nv np np PA gr 1-2 in prone, ED ED np np np     IASTM handlebar L scap lift    ED ED PK PK manual np    BP in seated nv NV L UE/seated  118/78 L UE/ seated 113/69 np np NP np np    measurements        ED         Exercise Diary  18    UBE standing nv 4 mins bkwrd 4 mins bkwrd 4 min bwd 5 min bwd 5 min bwd 5 min bwd 5 min bwd 5 min  bkwrd    pulleys nv 3 mins 3 mins 3 min 4 min 5 min 5 min nv 5 min    Cervical AROM nv 5" x 5 ea 5" x 10 ea 5"x10 ea 5"x10 ea 5"x10 ea 5"x10 ea nv 5" x 10 ea (no ext)    UE wall slides nv 5" x 10 5" x 10  5"x10 5"x10 5" x10 nv 5" x10    Scapular retraction nv X 15 15x   3"x20 3"x20 3"x20 nv 3"x20    TB ER w/ scap retraction nv Peach  3" x 10 Peach  3" x 15  peach Peach  3" x 15 Org  3" x 15 nv Org  3" x 15    Tspine extension over chair nv 5" x 10 5" x 10 5"x10 5"x15 5"x15 5"x15 nv 5"x15    Chin tucks in supine nv  3" x 10  nv 3" x 10 3" x 10 nv 3" x 10    Corner pec stretch    nv 15"x5 30" x 3 30" x 3 nv 30" x 3                                                                                                                                                       Modalities  5/9/18 5/11/18 5/14/18 5/16/18 5/21/18 5/30/18 6/4/18 6/6/18 6/11/18    MH PRN nv declined declined declined declined declined declined np np                                      Assessment:  Pt demonstrates good tolerance to TE but held cs extension secondary to c/o pain with that motion  Patient demonstrated fatigue post treatment and exhibited good technique with therapeutic exercises      Plan: Continue per plan of care

## 2018-06-13 ENCOUNTER — APPOINTMENT (OUTPATIENT)
Dept: PHYSICAL THERAPY | Facility: CLINIC | Age: 43
End: 2018-06-13
Payer: COMMERCIAL

## 2018-06-20 ENCOUNTER — OFFICE VISIT (OUTPATIENT)
Dept: PAIN MEDICINE | Facility: MEDICAL CENTER | Age: 43
End: 2018-06-20
Payer: COMMERCIAL

## 2018-06-20 VITALS — SYSTOLIC BLOOD PRESSURE: 118 MMHG | WEIGHT: 132 LBS | BODY MASS INDEX: 19.49 KG/M2 | DIASTOLIC BLOOD PRESSURE: 80 MMHG

## 2018-06-20 DIAGNOSIS — M79.18 CERVICAL MYOFASCIAL PAIN SYNDROME: ICD-10-CM

## 2018-06-20 DIAGNOSIS — M54.2 NECK PAIN: ICD-10-CM

## 2018-06-20 DIAGNOSIS — M47.812 SPONDYLOSIS OF CERVICAL REGION WITHOUT MYELOPATHY OR RADICULOPATHY: Primary | ICD-10-CM

## 2018-06-20 PROCEDURE — 99213 OFFICE O/P EST LOW 20 MIN: CPT | Performed by: NURSE PRACTITIONER

## 2018-06-20 RX ORDER — TIZANIDINE HYDROCHLORIDE 2 MG/1
2 CAPSULE, GELATIN COATED ORAL 3 TIMES DAILY
Qty: 90 CAPSULE | Refills: 1 | Status: SHIPPED | OUTPATIENT
Start: 2018-06-20 | End: 2018-08-15 | Stop reason: SDDI

## 2018-06-20 NOTE — PROGRESS NOTES
Pt is c/i neck pain that travels to his left sh  Assessment:  1  Spondylosis of cervical region without myelopathy or radiculopathy    2  Neck pain    3  Cervical myofascial pain syndrome        Plan: At today's visit, we did discuss the EMG that he had completed in May  This did not show any cervical radiculopathies  There was a possible left ulnar neuropathy  I did discuss with the patient that I do feel that the neck and left trapezius pain he is experiencing is myofascial in nature  I would like him to continue with physical therapy as it is providing him some relief  Continue with tizanidine this was refilled today  I did suggest that the patient try over-the-counter Biofreeze  Follow-up visit in 8 weeks if still having pain will try left cervical and upper trapezius trigger point injection        [place] Prescription Drug Monitoring Program report was reviewed and was appropriate       History of Present Illness: The patient is a 44 y o  male who presents for a follow up office visit in regards to Neck Pain  The patients current symptoms include neck and upper trapezius pain on the left rated 8/10, this is constant in nature most bothersome in the evening and at night  He describes his pain as burning, sharp, pressure-like, numbness, and stabbing  Patient is no longer taking the Cymbalta he states that it was not really providing him any relief so he stopped  He is taking the tizanidine 2 mg 3 times a day and he states that this is providing him a lot of relief of the cramping he was experiencing  I have personally reviewed and/or updated the patient's past medical history, past surgical history, family history, social history, current medications, allergies, and vital signs today  Review of Systems  Review of Systems   Respiratory: Negative for shortness of breath  Cardiovascular: Positive for chest pain     Gastrointestinal: Negative for constipation, diarrhea, nausea and vomiting  Musculoskeletal: Negative for arthralgias, gait problem, joint swelling and myalgias  Decreased rom  Muscle weakness  Joint stiffness   Skin: Negative for rash  Neurological: Positive for dizziness  Negative for seizures and weakness  All other systems reviewed and are negative  Past Medical History:   Diagnosis Date    Chronic pain disorder     low back and neck    Rectal bleeding     Right lumbar radiculopathy        Past Surgical History:   Procedure Laterality Date    KNEE ARTHROSCOPY Left     knee    NC COLONOSCOPY FLX DX W/COLLJ SPEC WHEN PFRMD N/A 5/2/2017    Procedure: COLONOSCOPY with polypectomies;  Surgeon: Misti Owens MD;  Location: [facility]; Service: General       Family History   Problem Relation Age of Onset    Cancer Mother        Social History     Occupational History    Not on file  Social History Main Topics    Smoking status: Never Smoker    Smokeless tobacco: Never Used    Alcohol use Yes      Comment: socially    Drug use: Yes     Types: Marijuana      Comment: daily    Sexual activity: Not on file         Current Outpatient Prescriptions:     TiZANidine (ZANAFLEX) 2 MG capsule, Take 1 capsule (2 mg total) by mouth 3 (three) times a day Use one tablet at bedtime, Disp: 90 capsule, Rfl: 1    Allergies   Allergen Reactions    Pregabalin      Depression       Physical Exam:    /80   Wt 59 9 kg (132 lb)   BMI 19 49 kg/m²     Constitutional:normal, well developed, well nourished, alert, in no distress and non-toxic and no overt pain behavior    Eyes:anicteric  HEENT:grossly intact  Neck:supple, symmetric, trachea midline and no masses   Pulmonary:even and unlabored  Cardiovascular:No edema or pitting edema present  Skin:Normal without rashes or lesions and well hydrated  Psychiatric:Mood and affect appropriate  Neurologic:Cranial Nerves II-XII grossly intact  Musculoskeletal:normal   Cervical Spine Exam    Appearance:  Normal lordosis  Palpation/Tenderness:  left cervical paraspinal tenderness  left trapezium tenderness          Imaging  No orders to display   MRI cervical spine wo contrast   Status: Final result   PACS Images     Show images for MRI cervical spine wo contrast   Order Report      Order Details   Study Result     MRI CERVICAL SPINE WITHOUT CONTRAST     INDICATION:  61-year-old male, posterior lower neck pain  COMPARISON:  None      TECHNIQUE:  Sagittal T1, sagittal T2, sagittal inversion recovery, axial T2, axial  2D merge          IMAGE QUALITY:  Diagnostic     FINDINGS:     ALIGNMENT:  Normal alignment of the cervical spine  No compression fracture  No subluxation  No scoliosis      MARROW SIGNAL:  Normal marrow signal is identified within the visualized bony structures  No discrete marrow lesion      CERVICAL AND VISUALIZED THORACIC CORD:  Minimally prominent central canal of the spinal cord extends from approximately C3-C7, likely developmental   The degree of prominence is not sufficient to be considered typical syrinx    3 month MRI follow-up   recommended performed without and with IV contrast      PREVERTEBRAL AND PARASPINAL SOFT TISSUES:  Prevertebral and paraspinal soft tissues are unremarkable      VISUALIZED POSTERIOR FOSSA:  The visualized posterior fossa demonstrates no abnormal signal      CERVICAL DISC SPACES:          C2-C3:  Normal      C3-C4:  Normal      C4-C5:  Minimal degenerative disc and facet disease, no stenosis     C5-C6:  Minimal degenerative disc and facet disease, no stenosis     C6-C7:  Minimal degenerative disc and facet disease, no stenosis     C7-T1:  Normal      UPPER THORACIC DISC SPACES:  Normal      IMPRESSION:  Minimal multilevel degenerative spondylosis     No evidence of disc herniation, central canal or foraminal stenosis     Mild multilevel prominence of the central canal of the cervical spinal cord, likely developmental   MRI follow-up recommended as discussed above        ##sigslh##sigslh        Workstation performed: [id number]         No orders of the defined types were placed in this encounter      [place]

## 2018-08-15 ENCOUNTER — OFFICE VISIT (OUTPATIENT)
Dept: PAIN MEDICINE | Facility: MEDICAL CENTER | Age: 43
End: 2018-08-15
Payer: COMMERCIAL

## 2018-08-15 VITALS
WEIGHT: 130.4 LBS | BODY MASS INDEX: 19.31 KG/M2 | SYSTOLIC BLOOD PRESSURE: 108 MMHG | RESPIRATION RATE: 12 BRPM | HEIGHT: 69 IN | DIASTOLIC BLOOD PRESSURE: 56 MMHG | HEART RATE: 64 BPM

## 2018-08-15 DIAGNOSIS — R20.2 PARESTHESIA OF RIGHT UPPER EXTREMITY: ICD-10-CM

## 2018-08-15 DIAGNOSIS — M79.18 CERVICAL MYOFASCIAL PAIN SYNDROME: ICD-10-CM

## 2018-08-15 DIAGNOSIS — M47.812 SPONDYLOSIS OF CERVICAL REGION WITHOUT MYELOPATHY OR RADICULOPATHY: Primary | ICD-10-CM

## 2018-08-15 DIAGNOSIS — M54.2 NECK PAIN: ICD-10-CM

## 2018-08-15 PROCEDURE — 99213 OFFICE O/P EST LOW 20 MIN: CPT | Performed by: NURSE PRACTITIONER

## 2018-08-15 RX ORDER — GABAPENTIN 300 MG/1
300 CAPSULE ORAL 3 TIMES DAILY
Qty: 90 CAPSULE | Refills: 0 | Status: SHIPPED | OUTPATIENT
Start: 2018-08-15 | End: 2018-09-12 | Stop reason: SDUPTHER

## 2018-08-15 NOTE — PROGRESS NOTES
Assessment:  1  Spondylosis of cervical region without myelopathy or radiculopathy    2  Neck pain    3  Cervical myofascial pain syndrome    4  Paresthesia of right upper extremity        Plan: At this time I did have a long discussion with the patient regarding his treatment options  He has had cervical medial branch blocks in the past which made his pain worse  He is no longer taking the tizanidine he has had a lot of stomach issues resulting in bleeding so he has stopped all medication  He has tried Cymbalta in the past which made him nauseous  I did offer him some trigger point injections however he is not interested  After our discussion he does tell me that in the past he was on Lyrica and it did provide him relief of the pain in his neck and shoulders  He could not tolerate the medication so he stopped however it was giving him good relief  I feel it is reasonable to trial gabapentin 300 mg 1 tablet 3 times a day and the patient is willing to try this medication  He was given a counter on how to safely increase the dose of the medication  If he experiences side effects in a negative way I would consider nortriptyline 25 mg at bedtime  Return in 4 weeks for re-evaluation        [address] [place] [address] [employer] [facility] report was reviewed and was appropriate         My impressions and treatment recommendations were discussed in detail with the patient who verbalized understanding and had no further questions  Discharge instructions were provided  I personally saw and examined the patient and I agree with the above discussed plan of care  No orders of the defined types were placed in this encounter      New Medications Ordered This Visit   Medications    gabapentin (NEURONTIN) 300 mg capsule     Sig: Take 1 capsule (300 mg total) by mouth 3 (three) times a day     Dispense:  90 capsule     Refill:  0       History of Present Illness:    Ian Green is a 44 y o  male who presents for follow-up related to his neck and shoulder pain he rates his pain 8/10 this is constant in nature most bothersome in the morning and at night  He describes his pain as burning, sharp, pressure-like, pins and needles, and shooting  The patient has had an EMG which was normal except for some ulnar neuropathy  He has also had cervical spine MRI last 1 being [facility] 2017 which was also normal except for minimal facet arthropathy  Patient tells me that he was told he has a cut and a leak at the ball of his neck " he is going to obtain records from his PCP as I am not seeing this on his most recent MRI  Patient stopped going to physical therapy as it was getting him very frustrated to see people older than him doing activities that he can do  I have personally reviewed and/or updated the patient's past medical history, past surgical history, family history, social history, current medications, allergies, and vital signs today  Review of Systems:    Review of Systems   Respiratory: Negative for shortness of breath  Cardiovascular: Positive for chest pain  Gastrointestinal: Negative for constipation, diarrhea, nausea and vomiting  Musculoskeletal: Positive for gait problem, joint swelling, myalgias and neck pain  Negative for arthralgias  Skin: Negative for rash  Neurological: Positive for weakness  Negative for dizziness and seizures  Psychiatric/Behavioral: Positive for decreased concentration  All other systems reviewed and are negative        Patient Active Problem List   Diagnosis    Neck pain    Cervical myofascial pain syndrome    Spondylosis of cervical region without myelopathy or radiculopathy    Paresthesia of right upper extremity       Past Medical History:   Diagnosis Date    Chronic pain disorder     low back and neck    Rectal bleeding     Right lumbar radiculopathy        Past Surgical History:   Procedure Laterality Date    KNEE ARTHROSCOPY Left     knee    [place] COLONOSCOPY FLX DX W/COLLJ SPEC WHEN PFRMD N/A 5/2/2017    Procedure: COLONOSCOPY with polypectomies;  Surgeon: Oseas Montano MD;  Location: [facility]; Service: General       Family History   Problem Relation Age of Onset    Cancer Mother        Social History     Occupational History    Not on file  Social History Main Topics    Smoking status: Never Smoker    Smokeless tobacco: Never Used    Alcohol use Yes      Comment: socially    Drug use: Yes     Types: Marijuana      Comment: daily    Sexual activity: Not on file       Current Outpatient Prescriptions on File Prior to Visit   Medication Sig    [DISCONTINUED] TiZANidine (ZANAFLEX) 2 MG capsule Take 1 capsule (2 mg total) by mouth 3 (three) times a day Use one tablet at bedtime     No current facility-administered medications on file prior to visit  Allergies   Allergen Reactions    Pregabalin      Depression       Physical Exam:    /56   Pulse 64   Resp 12   Ht 5' 9" (1 753 m)   Wt 59 1 kg (130 lb 6 4 oz)   BMI 19 26 kg/m²     Constitutional: normal, well developed, well nourished, alert, in no distress and non-toxic and no overt pain behavior    Eyes: anicteric  HEENT: grossly intact  Neck: supple, symmetric, trachea midline and no masses   Pulmonary:even and unlabored  Cardiovascular:No edema or pitting edema present  Skin:Normal without rashes or lesions and well hydrated  Psychiatric:Mood and affect appropriate  Neurologic:Cranial Nerves II-XII grossly intact  Musculoskeletal:normal   Cervical Spine Exam    Appearance:  Normal lordosis  Palpation/Tenderness:  left cervical paraspinal tenderness  right cervical paraspinal tenderness      Range of Motion:  Flexion:  No limitation  with pain  Extension:  No limitation  with pain  Lateral Flexion - Left:  No limitation  with pain  Lateral Flexion - Right:  No limitation  with pain  Rotation - Left:  No limitation  with pain  Rotation - Right:  No limitation  with pain  Motor Strength:  Left Arm Flexion  5/5  Left Arm Extension  5/5  Right Arm Flexion  5/5  Right Arm Extension  5/5  Left Wrist Flexion  5/5  Left Wrist Extension  5/5  Left Finger Abduction  5/5  Right Finger Abduction  5/5  Left Pincer Grasp  5/5  Right Pincer Grasp  5/5  Left    5/5  Right   5/5          Imaging

## 2018-09-12 ENCOUNTER — OFFICE VISIT (OUTPATIENT)
Dept: PAIN MEDICINE | Facility: MEDICAL CENTER | Age: 43
End: 2018-09-12
Payer: COMMERCIAL

## 2018-09-12 VITALS
BODY MASS INDEX: 18.19 KG/M2 | RESPIRATION RATE: 12 BRPM | DIASTOLIC BLOOD PRESSURE: 64 MMHG | WEIGHT: 122.8 LBS | SYSTOLIC BLOOD PRESSURE: 108 MMHG | HEIGHT: 69 IN | HEART RATE: 60 BPM

## 2018-09-12 DIAGNOSIS — R20.2 PARESTHESIA OF RIGHT UPPER EXTREMITY: ICD-10-CM

## 2018-09-12 DIAGNOSIS — M54.2 NECK PAIN: ICD-10-CM

## 2018-09-12 DIAGNOSIS — M47.812 SPONDYLOSIS OF CERVICAL REGION WITHOUT MYELOPATHY OR RADICULOPATHY: Primary | ICD-10-CM

## 2018-09-12 DIAGNOSIS — M79.18 CERVICAL MYOFASCIAL PAIN SYNDROME: ICD-10-CM

## 2018-09-12 PROCEDURE — 99213 OFFICE O/P EST LOW 20 MIN: CPT | Performed by: NURSE PRACTITIONER

## 2018-09-12 RX ORDER — GABAPENTIN 300 MG/1
600 CAPSULE ORAL 3 TIMES DAILY
Qty: 180 CAPSULE | Refills: 1 | Status: SHIPPED | OUTPATIENT
Start: 2018-09-12 | End: 2018-11-07 | Stop reason: SDUPTHER

## 2018-09-12 NOTE — PROGRESS NOTES
Assessment:  1  Spondylosis of cervical region without myelopathy or radiculopathy    2  Neck pain    3  Cervical myofascial pain syndrome    4  Paresthesia of right upper extremity        Plan: At this time since the patient is experiencing some relief from the gabapentin I feel it is reasonable to increase the dose to the therapeutic dose range of 1800 mg to provide further benefit  This medication was refilled today  Follow-up visit in 8 weeks for re-evaluation of medication  [place] Prescription Drug Monitoring Program report was reviewed and was appropriate         My impressions and treatment recommendations were discussed in detail with the patient who verbalized understanding and had no further questions  Discharge instructions were provided  I personally saw and examined the patient and I agree with the above discussed plan of care  No orders of the defined types were placed in this encounter  New Medications Ordered This Visit   Medications    gabapentin (NEURONTIN) 300 mg capsule     Sig: Take 2 capsules (600 mg total) by mouth 3 (three) times a day     Dispense:  180 capsule     Refill:  1       History of Present Illness:    Josie Pelayo is a 44 y o  male who presents for follow-up related to his neck and right arm pain  Today he rates his pain 7/10  This is constant in nature most bothersome in the morning and night  He describes his pain as burning, sharp, pressure-like, numbness, and pins and needles  The patient tells me that he does feel slightly better since starting gabapentin at his last office visit  Patient has been using gabapentin 300 mg twice a day with 20% relief and no side effects or issues  I have personally reviewed and/or updated the patient's past medical history, past surgical history, family history, social history, current medications, allergies, and vital signs today       Review of Systems:    Review of Systems   Respiratory: Negative for shortness of breath  Cardiovascular: Positive for chest pain  Gastrointestinal: Negative for constipation, diarrhea, nausea and vomiting  Musculoskeletal: Positive for joint swelling and myalgias  Negative for arthralgias and gait problem  Skin: Negative for rash  Neurological: Positive for weakness  Negative for dizziness and seizures  Psychiatric/Behavioral: Positive for decreased concentration  All other systems reviewed and are negative  Patient Active Problem List   Diagnosis    Neck pain    Cervical myofascial pain syndrome    Spondylosis of cervical region without myelopathy or radiculopathy    Paresthesia of right upper extremity       Past Medical History:   Diagnosis Date    Chronic pain disorder     low back and neck    Rectal bleeding     Right lumbar radiculopathy        Past Surgical History:   Procedure Laterality Date    KNEE ARTHROSCOPY Left     knee    [place] COLONOSCOPY FLX DX W/COLLJ SPEC WHEN PFRMD N/A 5/2/2017    Procedure: COLONOSCOPY with polypectomies;  Surgeon: Sandra Askew MD;  Location: [facility]; Service: General       Family History   Problem Relation Age of Onset    Cancer Mother        Social History     Occupational History    Not on file  Social History Main Topics    Smoking status: Never Smoker    Smokeless tobacco: Never Used    Alcohol use Yes      Comment: socially    Drug use: Yes     Types: Marijuana      Comment: daily    Sexual activity: Not on file       Current Outpatient Prescriptions on File Prior to Visit   Medication Sig    [DISCONTINUED] gabapentin (NEURONTIN) 300 mg capsule Take 1 capsule (300 mg total) by mouth 3 (three) times a day     No current facility-administered medications on file prior to visit          Allergies   Allergen Reactions    Pregabalin      Depression       Physical Exam:    /64   Pulse 60   Resp 12   Ht 5' 9" (1 753 m)   Wt 55 7 kg (122 lb 12 8 oz)   BMI 18 13 kg/m²     Constitutional: normal, well developed, well nourished, alert, in no distress and non-toxic and no overt pain behavior    Eyes: anicteric  HEENT: grossly intact  Neck: supple, symmetric, trachea midline and no masses   Pulmonary:even and unlabored  Cardiovascular:No edema or pitting edema present  Skin:Normal without rashes or lesions and well hydrated  Psychiatric:Mood and affect appropriate  Neurologic:Cranial Nerves II-XII grossly intact  Musculoskeletal:normal    Imaging

## 2018-11-07 ENCOUNTER — OFFICE VISIT (OUTPATIENT)
Dept: PAIN MEDICINE | Facility: MEDICAL CENTER | Age: 43
End: 2018-11-07
Payer: COMMERCIAL

## 2018-11-07 VITALS
BODY MASS INDEX: 19.26 KG/M2 | SYSTOLIC BLOOD PRESSURE: 108 MMHG | WEIGHT: 130 LBS | DIASTOLIC BLOOD PRESSURE: 74 MMHG | HEIGHT: 69 IN | HEART RATE: 70 BPM

## 2018-11-07 DIAGNOSIS — M47.812 SPONDYLOSIS OF CERVICAL REGION WITHOUT MYELOPATHY OR RADICULOPATHY: Primary | ICD-10-CM

## 2018-11-07 DIAGNOSIS — M54.2 NECK PAIN: ICD-10-CM

## 2018-11-07 DIAGNOSIS — R20.2 PARESTHESIA OF RIGHT UPPER EXTREMITY: ICD-10-CM

## 2018-11-07 DIAGNOSIS — M79.18 CERVICAL MYOFASCIAL PAIN SYNDROME: ICD-10-CM

## 2018-11-07 PROCEDURE — 99213 OFFICE O/P EST LOW 20 MIN: CPT | Performed by: NURSE PRACTITIONER

## 2018-11-07 RX ORDER — CYCLOBENZAPRINE HCL 10 MG
10 TABLET ORAL
Qty: 30 TABLET | Refills: 2 | Status: SHIPPED | OUTPATIENT
Start: 2018-11-07 | End: 2019-01-30 | Stop reason: SDUPTHER

## 2018-11-07 RX ORDER — GABAPENTIN 300 MG/1
600 CAPSULE ORAL 3 TIMES DAILY
Qty: 180 CAPSULE | Refills: 2 | Status: SHIPPED | OUTPATIENT
Start: 2018-11-07 | End: 2019-01-30 | Stop reason: SDUPTHER

## 2018-11-07 NOTE — PROGRESS NOTES
Assessment:  1  Spondylosis of cervical region without myelopathy or radiculopathy    2  Cervical myofascial pain syndrome    3  Paresthesia of right upper extremity    4  Neck pain        Plan:  Continue with gabapentin this was refilled today  I will also initiate cyclobenzaprine 10 mg 1 tablet at bedtime for the muscle pain he is experiencing in his left upper trapezius  I would also like him to begin using his at home TENS unit in this area up to 4 times a day  Patient can use over-the-counter lidocaine patches on his posterior neck and leave in place for 12 hr as needed for pain  Follow-up in 12 weeks for medication refills        [place] Prescription Drug Monitoring Program report was reviewed and was appropriate         My impressions and treatment recommendations were discussed in detail with the patient who verbalized understanding and had no further questions  Discharge instructions were provided  I personally saw and examined the patient and I agree with the above discussed plan of care  No orders of the defined types were placed in this encounter  New Medications Ordered This Visit   Medications    gabapentin (NEURONTIN) 300 mg capsule     Sig: Take 2 capsules (600 mg total) by mouth 3 (three) times a day     Dispense:  180 capsule     Refill:  2       History of Present Illness:    Litzy Vasquez is a 44 y o  male who presents for follow-up related to his neck pain  Today he rates his pain 7/10 this is constant in nature most bothersome in the morning and at night  He describes his pain as burning, dull, aching, and pressure-like  The patient reports that since starting gabapentin the stabbing sharp pain he has been experiencing in his posterior neck has definitely improved  Patient takes gabapentin 300 mg 2 tablets 3 times a day with 30-35% relief of his pain symptoms    He is happy with the results of this medication and reports short-term memory loss as his only side effect  I have personally reviewed and/or updated the patient's past medical history, past surgical history, family history, social history, current medications, allergies, and vital signs today  Review of Systems:    Review of Systems   Constitutional: Negative for fever and unexpected weight change  HENT: Negative for trouble swallowing  Eyes: Negative for visual disturbance  Respiratory: Negative for shortness of breath and wheezing  Cardiovascular: Negative for chest pain and palpitations  Gastrointestinal: Negative for constipation, diarrhea, nausea and vomiting  Endocrine: Negative for cold intolerance, heat intolerance and polydipsia  Genitourinary: Negative for difficulty urinating and frequency  Musculoskeletal: Positive for joint swelling  Negative for arthralgias, gait problem and myalgias  Decreased ROM, swelling   Skin: Negative for rash  Neurological: Positive for weakness  Negative for dizziness, seizures, syncope and headaches  Memory loss   Hematological: Does not bruise/bleed easily  Psychiatric/Behavioral: Negative for dysphoric mood  All other systems reviewed and are negative  Patient Active Problem List   Diagnosis    Neck pain    Cervical myofascial pain syndrome    Spondylosis of cervical region without myelopathy or radiculopathy    Paresthesia of right upper extremity       Past Medical History:   Diagnosis Date    Chronic pain disorder     low back and neck    Rectal bleeding     Right lumbar radiculopathy        Past Surgical History:   Procedure Laterality Date    KNEE ARTHROSCOPY Left     knee    [place] COLONOSCOPY FLX DX W/COLLJ SPEC WHEN PFRMD N/A 5/2/2017    Procedure: COLONOSCOPY with polypectomies;  Surgeon: Cheryle Martin MD;  Location: [facility]; Service: General       Family History   Problem Relation Age of Onset    Cancer Mother        Social History     Occupational History    Not on file       Social History Main Topics    Smoking status: Never Smoker    Smokeless tobacco: Never Used    Alcohol use Yes      Comment: socially    Drug use: Yes     Types: Marijuana      Comment: daily    Sexual activity: Not on file       Current Outpatient Prescriptions on File Prior to Visit   Medication Sig    [DISCONTINUED] gabapentin (NEURONTIN) 300 mg capsule Take 2 capsules (600 mg total) by mouth 3 (three) times a day     No current facility-administered medications on file prior to visit  Allergies   Allergen Reactions    Pregabalin      Depression       Physical Exam:    /74   Pulse 70   Ht 5' 9" (1 753 m)   Wt 59 kg (130 lb)   BMI 19 20 kg/m²     Constitutional: normal, well developed, well nourished, alert, in no distress and non-toxic and no overt pain behavior    Eyes: anicteric  HEENT: grossly intact  Neck: supple, symmetric, trachea midline and no masses   Pulmonary:even and unlabored  Cardiovascular:No edema or pitting edema present  Skin:Normal without rashes or lesions and well hydrated  Psychiatric:Mood and affect appropriate  Neurologic:Cranial Nerves II-XII grossly intact  Musculoskeletal:normal    Imaging

## 2018-12-17 ENCOUNTER — HOSPITAL ENCOUNTER (EMERGENCY)
Facility: HOSPITAL | Age: 43
Discharge: HOME | End: 2018-12-17
Attending: EMERGENCY MEDICINE | Admitting: EMERGENCY MEDICINE
Payer: COMMERCIAL

## 2018-12-17 ENCOUNTER — TELEPHONE (OUTPATIENT)
Dept: PHYSICAL THERAPY | Facility: OTHER | Age: 43
End: 2018-12-17

## 2018-12-17 ENCOUNTER — NURSE TRIAGE (OUTPATIENT)
Dept: PHYSICAL THERAPY | Facility: OTHER | Age: 43
End: 2018-12-17

## 2018-12-17 VITALS
SYSTOLIC BLOOD PRESSURE: 139 MMHG | DIASTOLIC BLOOD PRESSURE: 63 MMHG | RESPIRATION RATE: 17 BRPM | BODY MASS INDEX: 19.94 KG/M2 | HEART RATE: 99 BPM | TEMPERATURE: 99 F | WEIGHT: 135 LBS

## 2018-12-17 DIAGNOSIS — M54.5 CHRONIC BILATERAL LOW BACK PAIN, WITH SCIATICA PRESENCE UNSPECIFIED: Primary | ICD-10-CM

## 2018-12-17 DIAGNOSIS — S39.012A STRAIN OF LUMBAR REGION, INITIAL ENCOUNTER: Primary | ICD-10-CM

## 2018-12-17 DIAGNOSIS — M25.552 PAIN OF BOTH HIP JOINTS: ICD-10-CM

## 2018-12-17 DIAGNOSIS — M25.551 PAIN OF BOTH HIP JOINTS: ICD-10-CM

## 2018-12-17 DIAGNOSIS — M54.5 ACUTE BILATERAL LOW BACK PAIN, WITH SCIATICA PRESENCE UNSPECIFIED: ICD-10-CM

## 2018-12-17 DIAGNOSIS — G89.29 CHRONIC BILATERAL LOW BACK PAIN, WITH SCIATICA PRESENCE UNSPECIFIED: Primary | ICD-10-CM

## 2018-12-17 PROCEDURE — 99283 EMERGENCY DEPT VISIT LOW MDM: CPT

## 2018-12-17 RX ORDER — LIDOCAINE 50 MG/G
1 PATCH TOPICAL DAILY
Qty: 7 PATCH | Refills: 0 | Status: SHIPPED | OUTPATIENT
Start: 2018-12-17 | End: 2019-01-30

## 2018-12-17 RX ORDER — IBUPROFEN 600 MG/1
600 TABLET ORAL EVERY 6 HOURS PRN
Qty: 30 TABLET | Refills: 0 | Status: SHIPPED | OUTPATIENT
Start: 2018-12-17 | End: 2019-03-27 | Stop reason: ALTCHOICE

## 2018-12-17 NOTE — TELEPHONE ENCOUNTER
Reason for Disposition   Is this a chronic condition? Additional Information   Commented on: Has the patient experienced major trauma? (fall from height, high speed collision, direct blow to spine) and is also experiencing nausea, light-headedness, or loss of consciousness? Fell 6 days ago - patient has been managed    Background - Initial Assessment  Clinical complaint: low back and bilateral hip pain  Date of onset: 12/11/18  Mechanism of injury: fell down steps    Previous Treatment - Previous Treatment  Previous evaluation: ED evaluation  Current provider: Tia Goode  Diagnostics: MRI of cervical spine; MRI of lumbar spine in 2017  Previous treatment: anti inflammatory in ED    Protocols used:  [facility] COMPREHENSIVE SPINE PROGRAM PROTOCOL    Acute fall on chronic low back and neck pain  Patient feels like he is unable to bend with back pain  Patient states he is only comfortable when he is laying down  Patient reports generalized back weakness  Patient to be re referred and set up with physical therapy for his lower back  Patient states he was supposed to follow up on low back pain and lumbar MRI with [facility], and did not do so  Patient advised to follow up with [facility] provider  RN to place new referral for [facility] for low back as well  Pt made aware per protocol, a referral would be entered to [facility] based on triage intake assessment questions  The goal is to take care of patients emotional well-being in addition to the physical well-being  We recognize that you are at an increased risk for anxiety and depression with the worrying thoughts you've expressed  Someone from [facility] will contact you to discuss options and services

## 2018-12-17 NOTE — DISCHARGE INSTRUCTIONS
Low Back Strain   WHAT YOU NEED TO KNOW:   Low back strain is an injury to your lower back muscles or tendons  Tendons are strong tissues that connect muscles to bones  The lower back supports most of your body weight and helps you move, twist, and bend  DISCHARGE INSTRUCTIONS:   Return to the emergency department if:   · You hear or feel a pop in your lower back  · You have increased swelling or pain in your lower back  · You have trouble moving your legs  · Your legs are numb  Contact your healthcare provider if:   · You have a fever  · Your pain does not go away, even after treatment  · You have questions or concerns about your condition or care  Medicines: The following medicines may be ordered by your healthcare provider:  · Acetaminophen decreases pain and fever  It is available without a doctor's order  Ask how much to take and how often to take it  Follow directions  Acetaminophen can cause liver damage if not taken correctly  · NSAIDs , such as ibuprofen, help decrease swelling, pain, and fever  This medicine is available with or without a doctor's order  NSAIDs can cause stomach bleeding or kidney problems in certain people  If you take blood thinner medicine, always ask your healthcare provider if NSAIDs are safe for you  Always read the medicine label and follow directions  · Muscle relaxers  help decrease pain and muscle spasms  · Prescription pain medicine  may be given  Ask how to take this medicine safely  · Take your medicine as directed  Contact your healthcare provider if you think your medicine is not helping or if you have side effects  Tell him or her if you are allergic to any medicine  Keep a list of the medicines, vitamins, and herbs you take  Include the amounts, and when and why you take them  Bring the list or the pill bottles to follow-up visits  Carry your medicine list with you in case of an emergency  Self-care:   · Rest  as directed   You may need to rest in bed for a period of time after your injury  Do not lift heavy objects  · Apply ice  on your back for 15 to 20 minutes every hour or as directed  Use an ice pack, or put crushed ice in a plastic bag  Cover it with a towel  Ice helps prevent tissue damage and decreases swelling and pain  · Apply heat  on your lower back for 20 to 30 minutes every 2 hours for as many days as directed  Heat helps decrease pain and muscle spasms  · Slowly start to increase your activity  as the pain decreases, or as directed  Prevent another low back strain:   · Use correct body movements  ¨ Bend at the hips and knees when you  objects  Do not bend from the waist  Use your leg muscles as you lift the load  Do not use your back  Keep the object close to your chest as you lift it  Try not to twist or lift anything above your waist     ¨ Change your position often when you stand for long periods of time  Rest one foot on a small box or footrest, and then switch to the other foot often  ¨ Try not to sit for long periods of time  When you do, sit in a straight-backed chair with your feet flat on the floor  ¨ Never reach, pull, or push while you are sitting  · Warm up before you exercise  Do exercises that strengthen your back muscles  Ask your healthcare provider about the best exercise plan for you  · Maintain a healthy weight  Ask your healthcare provider how much you should weigh  Ask him to help you create a weight loss plan if you are overweight  © 2017 [employer] Information is for End User's use only and may not be sold, redistributed or otherwise used for commercial purposes  All illustrations and images included in CareNotes® are the copyrighted property of [employer]  or [employer]  The above information is an  only  It is not intended as medical advice for individual conditions or treatments   Talk to your doctor, nurse or pharmacist before following any medical regimen to see if it is safe and effective for you

## 2018-12-17 NOTE — ED PROVIDER NOTES
History  Chief Complaint   Patient presents with    Back Pain     pt c/o left lower back pain, reports slipping on steps approx 6 days ago  pt reports worsening pain  pt reports unable bend over denies loss of bowel or bladder control   Abdominal Pain     pt c/o generalized lower abd pain states "feels like cramping"  pt denies vomiting or fevers  History provided by:  Patient   used: No    Back Pain   Location:  Lumbar spine (left side)  Quality:  Aching  Radiates to:  Does not radiate  Pain severity:  Moderate  Pain is:  Same all the time  Onset quality:  Sudden  Duration:  1 week  Timing:  Constant  Progression:  Unchanged  Chronicity:  New  Context comment:  Slipped on stairs but caught self and didn't fall  Pain left lumbar area for 1 week without radiation  Relieved by:  Nothing  Worsened by:  Palpation, twisting and movement  Ineffective treatments:  None tried (takes gabapentin for neck )  Associated symptoms: abdominal pain    Associated symptoms: no bladder incontinence, no bowel incontinence, no chest pain, no fever, no headaches, no leg pain, no numbness, no paresthesias, no tingling and no weakness    Risk factors comment:  Cervical spine issues, sees pain management  Prior to Admission Medications   Prescriptions Last Dose Informant Patient Reported? Taking?    cyclobenzaprine (FLEXERIL) 10 mg tablet   No No   Sig: Take 1 tablet (10 mg total) by mouth daily at bedtime   gabapentin (NEURONTIN) 300 mg capsule   No No   Sig: Take 2 capsules (600 mg total) by mouth 3 (three) times a day      Facility-Administered Medications: None       Past Medical History:   Diagnosis Date    Chronic pain disorder     low back and neck    Rectal bleeding     Right lumbar radiculopathy        Past Surgical History:   Procedure Laterality Date    KNEE ARTHROSCOPY Left     knee    [place] COLONOSCOPY FLX DX W/COLLJ SPEC WHEN PFRMD N/A 5/2/2017    Procedure: COLONOSCOPY with polypectomies;  Surgeon: Gerald Petersen MD;  Location: [facility]; Service: General       Family History   Problem Relation Age of Onset    Cancer Mother      I have reviewed and agree with the history as documented  Social History   Substance Use Topics    Smoking status: Never Smoker    Smokeless tobacco: Never Used    Alcohol use Yes      Comment: socially        Review of Systems   Constitutional: Negative for chills and fever  Respiratory: Negative for chest tightness and shortness of breath  Cardiovascular: Negative for chest pain  Gastrointestinal: Positive for abdominal pain  Negative for bowel incontinence, diarrhea, nausea and vomiting  Genitourinary: Negative for bladder incontinence, difficulty urinating and flank pain  Musculoskeletal: Positive for back pain and neck pain  Negative for arthralgias and gait problem  Skin: Negative for color change  Neurological: Negative for tingling, weakness, numbness, headaches and paresthesias  All other systems reviewed and are negative  Physical Exam  Physical Exam   Constitutional: He appears well-developed and well-nourished  He is cooperative  Non-toxic appearance  He does not have a sickly appearance  He does not appear ill  No distress  HENT:   Head: Normocephalic and atraumatic  Right Ear: Hearing normal  No drainage or swelling  Left Ear: Hearing normal  No drainage or swelling  Mouth/Throat: Mucous membranes are normal    Eyes: Conjunctivae and lids are normal  Right eye exhibits no discharge  Left eye exhibits no discharge  Neck: Trachea normal and normal range of motion  No JVD present  Cardiovascular: Normal rate, regular rhythm, normal heart sounds, intact distal pulses and normal pulses  Exam reveals no gallop and no friction rub  No murmur heard  Pulmonary/Chest: Effort normal  No stridor  No respiratory distress  He exhibits no tenderness  Abdominal: Soft  Normal appearance   He exhibits no distension, no ascites and no mass  There is no hepatosplenomegaly  There is no tenderness  There is no rebound, no guarding and no CVA tenderness  Musculoskeletal: Normal range of motion  He exhibits no edema  Cervical back: Normal         Thoracic back: Normal         Lumbar back: He exhibits tenderness and pain  He exhibits normal range of motion, no bony tenderness, no swelling, no edema, no deformity and no spasm  Arms:  Lymphadenopathy:        Right: No inguinal adenopathy present  Left: No inguinal adenopathy present  Neurological: He is alert  He has normal strength and normal reflexes  He displays normal reflexes  No sensory deficit  He exhibits normal muscle tone  Coordination and gait normal  GCS eye subscore is 4  GCS verbal subscore is 5  GCS motor subscore is 6  Negative straight leg  Skin: Skin is warm, dry and intact  No rash noted  He is not diaphoretic  No pallor  Psychiatric: He has a normal mood and affect  His speech is normal and behavior is normal  Judgment and thought content normal  Cognition and memory are normal    Nursing note and vitals reviewed  Vital Signs  ED Triage Vitals [12/17/18 0918]   Temperature Pulse Respirations Blood Pressure SpO2   99 °F (37 2 °C) 99 17 139/63 --      Temp Source Heart Rate Source Patient Position - Orthostatic VS BP Location FiO2 (%)   Oral Monitor Sitting Right arm --      Pain Score       --           Vitals:    12/17/18 0918   BP: 139/63   Pulse: 99   Patient Position - Orthostatic VS: Sitting       Visual Acuity      ED Medications  Medications - No data to display    Diagnostic Studies  Results Reviewed     None                 No orders to display              Procedures  Procedures       Phone Contacts  ED Phone Contact    ED Course                               MDM  Number of Diagnoses or Management Options  Strain of lumbar region, initial encounter:   Diagnosis management comments: Muscle strain  Neuro intact  Ambulatory  No rash  Follow up comp spine and/or PCP  Amount and/or Complexity of Data Reviewed  Tests in the radiology section of CPT®: reviewed    Patient Progress  Patient progress: stable    CritCare Time    Disposition  Final diagnoses:   Strain of lumbar region, initial encounter     Time reflects when diagnosis was documented in both MDM as applicable and the Disposition within this note     Time User Action Codes Description Comment    12/17/2018  9:51 AM Matteo Client Add [[id number]] Strain of lumbar region, initial encounter       ED Disposition     ED Disposition Condition Comment    Discharge  [facility] discharge to home/self care  Condition at discharge: Good        Follow-up Information     Follow up With Specialties Details Why Contact Info Additional Information    Tia Goode PA-C Family Medicine, Physician Assistant Schedule an appointment as soon as possible for a visit in 1 week  [facility]  [address] [place] [address]  [facility]       [id number] [facility] Physical Therapy Schedule an appointment as soon as possible for a visit  [facility] [id number]  [address] [place] [address], [phone]          Patient's Medications   Discharge Prescriptions    IBUPROFEN (MOTRIN) 600 MG TABLET    Take 1 tablet (600 mg total) by mouth every 6 (six) hours as needed for mild pain for up to 10 days       Start Date: 12/17/2018End Date: 12/27/2018       Order Dose: 600 mg       Quantity: 30 tablet    Refills: 0    LIDOCAINE (LIDODERM) 5 %    Apply 1 patch topically daily Remove & Discard patch within 12 hours or as directed by MD       Start Date: 12/17/2018End Date: --       Order Dose: 1 patch       Quantity: 7 patch    Refills: 0       Outpatient Discharge Orders  Ambulatory Referral to Comprehensive Spine Program   Standing Status: Future  Standing Exp  Date: 06/17/19         ED Provider  Electronically Signed by           Genaro Bustaamnte MD  12/17/18 4203

## 2018-12-17 NOTE — TELEPHONE ENCOUNTER
Unsuccessful attempt at reaching patient on primary phone number  Phone would not ring and made crackling sound only

## 2018-12-20 ENCOUNTER — EVALUATION (OUTPATIENT)
Dept: PHYSICAL THERAPY | Facility: CLINIC | Age: 43
End: 2018-12-20
Payer: COMMERCIAL

## 2018-12-20 VITALS — DIASTOLIC BLOOD PRESSURE: 80 MMHG | HEART RATE: 77 BPM | SYSTOLIC BLOOD PRESSURE: 120 MMHG

## 2018-12-20 DIAGNOSIS — M54.5 CHRONIC BILATERAL LOW BACK PAIN, WITH SCIATICA PRESENCE UNSPECIFIED: ICD-10-CM

## 2018-12-20 DIAGNOSIS — M25.551 PAIN OF BOTH HIP JOINTS: ICD-10-CM

## 2018-12-20 DIAGNOSIS — M25.552 PAIN OF BOTH HIP JOINTS: ICD-10-CM

## 2018-12-20 DIAGNOSIS — M54.5 ACUTE BILATERAL LOW BACK PAIN, WITH SCIATICA PRESENCE UNSPECIFIED: Primary | ICD-10-CM

## 2018-12-20 DIAGNOSIS — G89.29 CHRONIC BILATERAL LOW BACK PAIN, WITH SCIATICA PRESENCE UNSPECIFIED: ICD-10-CM

## 2018-12-20 PROCEDURE — 97110 THERAPEUTIC EXERCISES: CPT | Performed by: PHYSICAL THERAPIST

## 2018-12-20 PROCEDURE — 97140 MANUAL THERAPY 1/> REGIONS: CPT | Performed by: PHYSICAL THERAPIST

## 2018-12-20 PROCEDURE — G8991 OTHER PT/OT GOAL STATUS: HCPCS | Performed by: PHYSICAL THERAPIST

## 2018-12-20 PROCEDURE — G8990 OTHER PT/OT CURRENT STATUS: HCPCS | Performed by: PHYSICAL THERAPIST

## 2018-12-20 PROCEDURE — 97161 PT EVAL LOW COMPLEX 20 MIN: CPT | Performed by: PHYSICAL THERAPIST

## 2018-12-20 NOTE — PROGRESS NOTES
PT Evaluation     Today's date: 2018  Patient name: Keya Gunn  : 1975  MRN: [id number]  Referring provider: Armani Darby MD  Dx:   Encounter Diagnosis     ICD-10-CM    1  Acute bilateral low back pain, with sciatica presence unspecified M54 5 Ambulatory referral to PT spine   2  Pain of both hip joints M25 551 Ambulatory referral to PT spine    M25 552    3  Chronic bilateral low back pain, with sciatica presence unspecified M54 5 Ambulatory referral to PT spine    G89 29                   Assessment  Assessment details: Pt is a pleasant 47 y o  male presenting to outpatient physical therapy via the [facility] with Acute midline low back pain with right-sided sciatica  (primary encounter diagnosis)   Pt presents with pain, decreased range of motion, decreased strength, decreased spinal accessory motion, and decreased tolerance to activity  Patient displays movement impairment diagnosis of thoracic hypomobility dysfunction  Pt is a good candidate for outpatient physical therapy, including therapeutic exercise, manual interventions/lumbar mobilizations, neuromuscular education exercises, therapeutic activity training, and would benefit from skilled physical therapy to address limitations and to achieve goals  Thank you for this referral    Impairments: abnormal coordination, abnormal muscle firing, abnormal muscle tone, abnormal or restricted ROM, abnormal movement, activity intolerance, impaired physical strength, pain with function and poor posture   Understanding of Dx/Px/POC: good   Prognosis: good    Goals  ST  Patient will report 25% decrease in pain in 4 weeks  2  Patient will demonstrate 25% improvement in ROM in 4 weeks  3  Patient will demonstrate 1/2 grade improvement in strength in 4 weeks  4  Patient will be able to bend forward to tie shoes in 4 weeks  LT  Patient will be able to perform IADLS without restriction or pain by discharge    2  Patient will be independent in HEP by discharge  3  Patient will be able to return to recreational/work duties without restriction or pain by discharge  Plan  Patient would benefit from: PT eval and skilled PT  Planned modality interventions: cryotherapy and thermotherapy: hydrocollator packs  Planned therapy interventions: IADL retraining, body mechanics training, flexibility, functional ROM exercises, home exercise program, neuromuscular re-education, manual therapy, postural training, strengthening, stretching, therapeutic activities, therapeutic exercise, joint mobilization, motor coordination training, activity modification, self care and patient education  Frequency: 2x week  Duration in visits: 4  Duration in weeks: 2  Treatment plan discussed with: patient        Subjective Evaluation    History of Present Illness  Mechanism of injury: : Pt reports he slipped going down the stairs and broke his fall by grabbing banister, noting he did not sustain trauma to back  Reports pain persisted for 6 days before he went to ED  Notes pain currently slightly improved, however, notes he feels fatigue in low back and pain in throacic region when palpated  Pt reports symptom AGGS are: bending forward, sitting, standing, walking, reaching  States pain is located in left thoracic region and ribs  Notes chronic history of bilateral low back and hip pain, described as tired and burning, however, notes symptoms seem to have been aggravated by fall  Reports he also has chronic history of tingling down bilateral LEs, however, denies any changes since fall  Pt denies bowel or bladder dysfunction, saddle anesthesia, fever, chills, nausea, or vomiting  Pt also denies recent unexplained weight loss  Reports symptoms EASES are: lying down supine, heat  Reports he had been having pain with breathing, but no longer notes pain with this       Pain  Current pain ratin  At best pain ratin  At worst pain rating: 7          Objective     Special Questions  Positive for disturbed sleep  Negative for bladder dysfunction, bowel dysfunction, saddle (S4) numbness and history of cancer    Postural Observations  Seated posture: fair  Standing posture: fair        Palpation   Left   No palpable tenderness to the quadratus lumborum  Hypertonic in the erector spinae and lumbar paraspinals  Tenderness of the erector spinae and lumbar paraspinals  Right   No palpable tenderness to the lumbar paraspinals and quadratus lumborum  Tenderness of the erector spinae and lumbar paraspinals  Active Range of Motion     Lumbar   Flexion: 35 degrees with pain  Extension: 8 degrees   Left lateral flexion: 25 degrees   Right lateral flexion: 25 degrees   Left rotation: 45 degrees   Right rotation: 20 degrees with pain    Additional Active Range of Motion Details  LUMBAR AROM -  Flexion, extension, bilat lateral flexion measured with bubble inclinometer at L1; Rotation measured with goniometer and patient seated      Passive Range of Motion     Additional Passive Range of Motion Details  12/20: Passive hip mobility testing - pain produced in low back with passive flexion, IR bilat    Tests     Lumbar     Left   Negative passive SLR  Right   Negative passive SLR             Precautions: n/a    Daily Treatment Diary     Manual  12/20            Prone T/S PA yue BENTLEY   Gr  IV            Sidelying rotational yue BENTLEY  Gr  IV                                                       Exercise Diary  12/20            Bike                          UTR to R 10"x5            Quad HTH str 10"x5            Cat/Camel             LTR                          L/S roll outs 3-way             Seated QL stretch 10"x5            T/S ext in chair                                                                                                                                                   Modalities

## 2018-12-21 ENCOUNTER — TELEPHONE (OUTPATIENT)
Dept: PSYCHIATRY | Facility: CLINIC | Age: 43
End: 2018-12-21

## 2018-12-21 NOTE — TELEPHONE ENCOUNTER
Behavorial Health Outpatient Intake Questions    Referred by: Spine referral    Check with provider before scheduling    Are there any developmental disabilities? No    Does the patient have hearing impairment? No    Does the patient have ICM or CTT? No    Taking injectable psychiatric medications? NoIf yes, patient can not be seen here  Has the patient ever seen or currently see a psychiatrist? Yes If yes who/when? Dr Cristi Smith, 6 yrs ago  Has the patient ever seen or currently see a therapist? Yes If yes who/when? ?, 6 yrs ago    How many visits did the pt have for previous psychiatric treatment?  History    Has the patient served in the [place] [address]? No    If yes, have you had combat services? No    Was the patient activated into federal active duty as a member of the national guard or reserve? No    Minor Child    Who has custody of the child? Is there a custody agreement? If there is a custody agreement remind parent that they must bring a copy to the first appt or they will not be seen  Behavorial Health Outpatient Intake History     Presenting Problem (in patient's words) Spine Referral    Substance Abuse:No concerns of substance abuse are reported  Has the patient been seen here previously, either inpatient or outpatient? No outpatient    If seen as outpatient, what provider(s) did the patient see? A member of the patient's family has been in therapy here with none    ACCEPTED as a patient Yes Appointment Date: 19 @ 10am Kate Burnham    Referred Elsewhere? No    Primary Care Physician: Ale Patterson PA-C    PCP telephone number: [phone] [facility]  ----------------------------------------------------------------------------------------------------------------------    Insurance subscriber:   [employer];     Address: Phone:                                   SSN:    Employer:                                                      Address:  ----------------------------------------------------------------------------------------------------------------------    Primary Insurance:    ma                                                               Phone:    ID number:                                                   Group number:  ----------------------------------------------------------------------------------------------------------------------    Secondary Insurance:                                                               Phone:    ID number:                                                   Group number:  ----------------------------------------------------------------------------------------------------------------------    Other insurance information:             _______________________________________

## 2018-12-27 ENCOUNTER — OFFICE VISIT (OUTPATIENT)
Dept: PHYSICAL THERAPY | Facility: CLINIC | Age: 43
End: 2018-12-27
Payer: COMMERCIAL

## 2018-12-27 DIAGNOSIS — M54.5 ACUTE BILATERAL LOW BACK PAIN, WITH SCIATICA PRESENCE UNSPECIFIED: Primary | ICD-10-CM

## 2018-12-27 DIAGNOSIS — M54.5 CHRONIC BILATERAL LOW BACK PAIN, WITH SCIATICA PRESENCE UNSPECIFIED: ICD-10-CM

## 2018-12-27 DIAGNOSIS — G89.29 CHRONIC BILATERAL LOW BACK PAIN, WITH SCIATICA PRESENCE UNSPECIFIED: ICD-10-CM

## 2018-12-27 PROCEDURE — 97110 THERAPEUTIC EXERCISES: CPT | Performed by: PHYSICAL THERAPIST

## 2018-12-27 PROCEDURE — 97140 MANUAL THERAPY 1/> REGIONS: CPT | Performed by: PHYSICAL THERAPIST

## 2018-12-27 PROCEDURE — 97112 NEUROMUSCULAR REEDUCATION: CPT | Performed by: PHYSICAL THERAPIST

## 2018-12-27 NOTE — PROGRESS NOTES
Daily Note     Today's date: 2018  Patient name: Pinky Shane  : 1975  MRN: [id number]  Referring provider: Simeon Noel MD  Dx:   Encounter Diagnosis     ICD-10-CM    1  Acute bilateral low back pain, with sciatica presence unspecified M54 5    2  Chronic bilateral low back pain, with sciatica presence unspecified M54 5     G89 29                   Subjective: Pt reports he was sore in left side of low back for 2 days following last treatment session, however, then felt improvements in soreness/back pain  Notes he currently still has some pain in left lumbar region, however, not as intense as previously reported  Objective: See treatment diary below    Precautions: n/a    Daily Treatment Diary     Manual             Prone T/S PA mobs SHEREE   Gr  IV SHEREE  Gr  IV           Sidelying rotational mobs SHEREE  Gr  IV SHEREE Gr  IV                                                      Exercise Diary             Bike  5 min                        UTR to R 10"x5 15"x5           Quad HTH str 10"x5 15"x5           Cat/Camel  x10 ea           LTR  15"x5 ea                        L/S roll outs - front & left  15"x5 ea           Seated QL stretch - to R 10"x5 15"x5            T/S ext in chair  5"x10                                                                   Modalities                                                       Assessment: Tolerated treatment well  Soreness and tightness noted with manuals, however, responded favorably to program and reported feeling decreased stiffness by end of session  Encouraged patient to continue stretches and mobility exercises at home  Patient exhibited good technique with therapeutic exercises and would benefit from continued PT    Plan: Continue per plan of care  Progress treatment as tolerated

## 2018-12-31 ENCOUNTER — OFFICE VISIT (OUTPATIENT)
Dept: PHYSICAL THERAPY | Facility: CLINIC | Age: 43
End: 2018-12-31
Payer: COMMERCIAL

## 2018-12-31 DIAGNOSIS — M54.5 ACUTE BILATERAL LOW BACK PAIN, WITH SCIATICA PRESENCE UNSPECIFIED: Primary | ICD-10-CM

## 2018-12-31 DIAGNOSIS — G89.29 CHRONIC BILATERAL LOW BACK PAIN, WITH SCIATICA PRESENCE UNSPECIFIED: ICD-10-CM

## 2018-12-31 DIAGNOSIS — M54.5 CHRONIC BILATERAL LOW BACK PAIN, WITH SCIATICA PRESENCE UNSPECIFIED: ICD-10-CM

## 2018-12-31 PROCEDURE — 97112 NEUROMUSCULAR REEDUCATION: CPT | Performed by: PHYSICAL THERAPIST

## 2018-12-31 PROCEDURE — 97110 THERAPEUTIC EXERCISES: CPT | Performed by: PHYSICAL THERAPIST

## 2018-12-31 PROCEDURE — 97140 MANUAL THERAPY 1/> REGIONS: CPT | Performed by: PHYSICAL THERAPIST

## 2018-12-31 NOTE — PROGRESS NOTES
Daily Note     Today's date: 2018  Patient name: Palma Choi  : 1975  MRN: [id number]  Referring provider: Jeromy Marr MD  Dx:   Encounter Diagnosis     ICD-10-CM    1  Acute bilateral low back pain, with sciatica presence unspecified M54 5    2  Chronic bilateral low back pain, with sciatica presence unspecified M54 5     G89 29    3  Pain of both hip joints M25 551     M25 552                   Subjective: Pt comes to therapy reporting continued stiffness in lumbar spine  Reports he feels good after leaving therapy, however, stiffness returns about 1-2 hours later  Reports he has mild symptom relief following home stretches, however, symptoms then return shortly after  Objective: See treatment diary below    Precautions: n/a    Daily Treatment Diary     Manual            Prone T/S PA mobs SHEREE   Gr  IV SHEREE  Gr  IV SHEREE  Gr  IV-V          Sidelying rotational mobs SEHREE  Gr  IV SHEREE Gr  IV SHEREE  Gr  IV-V                                                     Exercise Diary            Bike  5 min 5 min                       UTR to R 10"x5 15"x5 15"x5 ea          Quad HTH str 10"x5 15"x5 15"x5 ea          Cat/Camel  x10 ea x15 ea          LTR  15"x5 ea 15"x5 ea                       L/S roll outs - front & left  15"x5 ea 15"x5 ea          Seated QL stretch - to R 10"x5 15"x5  30"x3          T/S ext in chair  5"x10 5"x10          H/S seated stretch   30"x3 ea                                                     Modalities                                                       Assessment: Tolerated treatment well  Responds favorably to manuals and exercises, reporting decreased stiffness by end of session  Encouraged patient to continue stretches and mobility exercises at home  Patient exhibited good technique with therapeutic exercises and would benefit from continued PT    Plan: Continue per plan of care  Progress treatment as tolerated

## 2019-01-04 ENCOUNTER — OFFICE VISIT (OUTPATIENT)
Dept: PHYSICAL THERAPY | Facility: CLINIC | Age: 44
End: 2019-01-04
Payer: COMMERCIAL

## 2019-01-04 DIAGNOSIS — M54.5 CHRONIC BILATERAL LOW BACK PAIN, WITH SCIATICA PRESENCE UNSPECIFIED: ICD-10-CM

## 2019-01-04 DIAGNOSIS — M25.552 PAIN OF BOTH HIP JOINTS: ICD-10-CM

## 2019-01-04 DIAGNOSIS — M54.5 ACUTE BILATERAL LOW BACK PAIN, WITH SCIATICA PRESENCE UNSPECIFIED: Primary | ICD-10-CM

## 2019-01-04 DIAGNOSIS — G89.29 CHRONIC BILATERAL LOW BACK PAIN, WITH SCIATICA PRESENCE UNSPECIFIED: ICD-10-CM

## 2019-01-04 DIAGNOSIS — M25.551 PAIN OF BOTH HIP JOINTS: ICD-10-CM

## 2019-01-04 PROCEDURE — 97110 THERAPEUTIC EXERCISES: CPT

## 2019-01-04 PROCEDURE — 97140 MANUAL THERAPY 1/> REGIONS: CPT

## 2019-01-04 NOTE — PROGRESS NOTES
Daily Note     Today's date: 2019  Patient name: Reddy Foster  : 1975  MRN: [id number]  Referring provider: John Ma MD  Dx:   Encounter Diagnosis     ICD-10-CM    1  Acute bilateral low back pain, with sciatica presence unspecified M54 5    2  Chronic bilateral low back pain, with sciatica presence unspecified M54 5     G89 29    3  Pain of both hip joints M25 551     M25 552                   Subjective: Pt reports with c/o stiffness in lower back prior to beginning today  He noted "pressure" following last visit for about 2-3 hrs followed by short term relief  Objective: See treatment diary below    Precautions: n/a    Daily Treatment Diary     Manual           Prone T/S PA mobs SHEREE   Gr  IV SHEREE  Gr  IV SHEREE  Gr  IV-V          Sidelying rotational mobs SHEREE  Gr  IV SHEREE Gr  IV SHEREE  Gr  IV-V                                                     Exercise Diary           Bike  5 min 5 min 5 min                      UTR to R 10"x5 15"x5 15"x5 ea 15"x5         Quad HTH str 10"x5 15"x5 15"x5 ea 15"x5 ea         Cat/Camel  x10 ea x15 ea 5"x15         LTR  15"x5 ea 15"x5 ea 15"x5                      L/S roll outs - front & left  15"x5 ea 15"x5 ea 15"x5 ea         Seated QL stretch - to L 10"x5 15"x5  30"x3 15"x5         T/S ext in chair  5"x10 5"x10 5"x10         H/S seated stretch   30"x3 ea 30"x3 ea                                                    Modalities                                                       Assessment: Tolerated treatment well  Responds well to exercises and manuals; reported decreased stiffness by end of session  Patient exhibited good technique with therapeutic exercises and would benefit from continued PT    Plan: Continue per plan of care  Progress treatment as tolerated

## 2019-01-08 ENCOUNTER — OFFICE VISIT (OUTPATIENT)
Dept: PHYSICAL THERAPY | Facility: CLINIC | Age: 44
End: 2019-01-08
Payer: COMMERCIAL

## 2019-01-08 DIAGNOSIS — M54.5 CHRONIC BILATERAL LOW BACK PAIN, WITH SCIATICA PRESENCE UNSPECIFIED: ICD-10-CM

## 2019-01-08 DIAGNOSIS — M54.5 ACUTE BILATERAL LOW BACK PAIN, WITH SCIATICA PRESENCE UNSPECIFIED: Primary | ICD-10-CM

## 2019-01-08 DIAGNOSIS — M25.551 PAIN OF BOTH HIP JOINTS: ICD-10-CM

## 2019-01-08 DIAGNOSIS — G89.29 CHRONIC BILATERAL LOW BACK PAIN, WITH SCIATICA PRESENCE UNSPECIFIED: ICD-10-CM

## 2019-01-08 DIAGNOSIS — M25.552 PAIN OF BOTH HIP JOINTS: ICD-10-CM

## 2019-01-08 PROCEDURE — 97110 THERAPEUTIC EXERCISES: CPT

## 2019-01-08 PROCEDURE — 97140 MANUAL THERAPY 1/> REGIONS: CPT

## 2019-01-08 NOTE — PROGRESS NOTES
Daily Note     Today's date: 2019  Patient name: Jerrell Heimlich  : 1975  MRN: [id number]  Referring provider: Sonia Joiner MD  Dx:   Encounter Diagnosis     ICD-10-CM    1  Acute bilateral low back pain, with sciatica presence unspecified M54 5    2  Chronic bilateral low back pain, with sciatica presence unspecified M54 5     G89 29    3  Pain of both hip joints M25 551     M25 552                   Subjective: Pt reports with c/o stiffness and soreness in lower back prior to beginning today  He noted increased discomfort in LB with sitting for long periods while watching football over the weekend  Objective: See treatment diary below    Precautions: n/a    Daily Treatment Diary     Manual          Prone T/S PA mobs SHEREE   Gr  IV SHEREE  Gr  IV SHEREE  Gr  IV-V SHEREE  Gr  IV-V SHEREE  Gr  IV-V        Sidelying rotational mobs SHEREE  Gr  IV SHEREE Gr  IV SHEREE  Gr  IV-V SHEREE  Gr  IV-V SHEREE  Gr  IV-V                                                   Exercise Diary          Bike  5 min 5 min 5 min 5 min                     UTR to R 10"x5 15"x5 15"x5 ea 15"x5 15"x5        Quad HTH str 10"x5 15"x5 15"x5 ea 15"x5 ea 15"x5 ea        Cat/Camel  x10 ea x15 ea 5"x15 P! held        LTR  15"x5 ea 15"x5 ea 15"x5 15"x5                     L/S roll outs - front & left  15"x5 ea 15"x5 ea 15"x5 ea 15"x5 ea        Seated QL stretch - to R 10"x5 15"x5  30"x3 15"x5 15"x5        T/S ext in chair  5"x10 5"x10 5"x10 5"x10        H/S seated stretch   30"x3 ea 30"x3 ea 30"x3 ea                                                   Modalities                                                       Assessment: Tolerated treatment well  Responds well to exercises and manuals; reported decreased stiffness by end of session  Patient exhibited good technique with therapeutic exercises and would benefit from continued PT    Plan: Continue per plan of care  Progress treatment as tolerated

## 2019-01-10 ENCOUNTER — OFFICE VISIT (OUTPATIENT)
Dept: PHYSICAL THERAPY | Facility: CLINIC | Age: 44
End: 2019-01-10
Payer: COMMERCIAL

## 2019-01-10 DIAGNOSIS — M25.552 PAIN OF BOTH HIP JOINTS: ICD-10-CM

## 2019-01-10 DIAGNOSIS — M54.5 CHRONIC BILATERAL LOW BACK PAIN, WITH SCIATICA PRESENCE UNSPECIFIED: ICD-10-CM

## 2019-01-10 DIAGNOSIS — G89.29 CHRONIC BILATERAL LOW BACK PAIN, WITH SCIATICA PRESENCE UNSPECIFIED: ICD-10-CM

## 2019-01-10 DIAGNOSIS — M54.5 ACUTE BILATERAL LOW BACK PAIN, WITH SCIATICA PRESENCE UNSPECIFIED: Primary | ICD-10-CM

## 2019-01-10 DIAGNOSIS — M25.551 PAIN OF BOTH HIP JOINTS: ICD-10-CM

## 2019-01-10 PROCEDURE — 97140 MANUAL THERAPY 1/> REGIONS: CPT | Performed by: PHYSICAL THERAPIST

## 2019-01-10 PROCEDURE — 97110 THERAPEUTIC EXERCISES: CPT | Performed by: PHYSICAL THERAPIST

## 2019-01-10 NOTE — PROGRESS NOTES
Daily Note     Today's date: 1/10/2019  Patient name: Cheikh Zapata  : 1975  MRN: [id number]  Referring provider: Megan Sapp MD  Dx:   Encounter Diagnosis     ICD-10-CM    1  Acute bilateral low back pain, with sciatica presence unspecified M54 5    2  Chronic bilateral low back pain, with sciatica presence unspecified M54 5     G89 29    3  Pain of both hip joints M25 551     M25 552                   Subjective: Pt reports he feels relief of back stiffness for several hours following therapy sessions, however, notes stiffness then returns to baseline  Reports he performs exercises at home, with similar relief, however, notes relief does not last for longer than a few hours  Objective: See treatment diary below    Precautions: n/a    Daily Treatment Diary     Manual  12/20 12/27 12/31 1/4 1/8 1/10       Prone T/S PA mobs SHEREE   Gr  IV SHEREE  Gr  IV SHEREE  Gr  IV-V SHEREE  Gr  IV-V SHEREE  Gr  IV-V SHEREE  Gr  IV-V       Sidelying rotational mobs SHEREE  Gr  IV SHEREE Gr  IV SHEREE  Gr  IV-V SHEREE  Gr  IV-V SHEREE  Gr  IV-V SHEREE  Gr  IV-V                                                  Exercise Diary  12/20 12/17 12/31 1/4 1/8 1/10       Bike  5 min 5 min 5 min 5 min 5 min                    UTR to R 10"x5 15"x5 15"x5 ea 15"x5 15"x5 30"x3 ea       Quad HTH str 10"x5 15"x5 15"x5 ea 15"x5 ea 15"x5 ea 30"x3 ea       Cat/Camel  x10 ea x15 ea 5"x15 P! held np       LTR  15"x5 ea 15"x5 ea 15"x5 15"x5 30"x3 ea                    L/S roll outs - front & left  15"x5 ea 15"x5 ea 15"x5 ea 15"x5 ea 15"x5 ea       Seated QL stretch - to R 10"x5 15"x5  30"x3 15"x5 15"x5 15"x5       T/S ext in chair  5"x10 5"x10 5"x10 5"x10 5"x10       H/S seated stretch   30"x3 ea 30"x3 ea 30"x3 ea 30"x3 ea                                                  Modalities                                                       Assessment: Tolerated treatment well  Responds well to exercises and manuals; reported decreased stiffness by end of session   Increased hold times for several exercises and concluded with MHP today, in attempts to provide longer lasting relief  Encouraged patient to continue home program  Patient exhibited good technique with therapeutic exercises and would benefit from continued PT    Plan: Continue per plan of care  Progress treatment as tolerated

## 2019-01-15 ENCOUNTER — TELEPHONE (OUTPATIENT)
Dept: PHYSICAL THERAPY | Facility: CLINIC | Age: 44
End: 2019-01-15

## 2019-01-15 NOTE — TELEPHONE ENCOUNTER
Does not have future appointments scheduled  Called to see if patient would like to schedule more   Did not answer - left message

## 2019-01-30 ENCOUNTER — OFFICE VISIT (OUTPATIENT)
Dept: PAIN MEDICINE | Facility: MEDICAL CENTER | Age: 44
End: 2019-01-30
Payer: COMMERCIAL

## 2019-01-30 VITALS
RESPIRATION RATE: 12 BRPM | WEIGHT: 135.6 LBS | DIASTOLIC BLOOD PRESSURE: 60 MMHG | SYSTOLIC BLOOD PRESSURE: 102 MMHG | HEIGHT: 69 IN | BODY MASS INDEX: 20.08 KG/M2 | HEART RATE: 92 BPM

## 2019-01-30 DIAGNOSIS — M47.812 SPONDYLOSIS OF CERVICAL REGION WITHOUT MYELOPATHY OR RADICULOPATHY: Primary | ICD-10-CM

## 2019-01-30 DIAGNOSIS — M79.18 CERVICAL MYOFASCIAL PAIN SYNDROME: ICD-10-CM

## 2019-01-30 DIAGNOSIS — M54.2 NECK PAIN: ICD-10-CM

## 2019-01-30 DIAGNOSIS — R20.2 PARESTHESIA OF RIGHT UPPER EXTREMITY: ICD-10-CM

## 2019-01-30 PROCEDURE — 99213 OFFICE O/P EST LOW 20 MIN: CPT | Performed by: NURSE PRACTITIONER

## 2019-01-30 RX ORDER — GABAPENTIN 300 MG/1
900 CAPSULE ORAL 3 TIMES DAILY
Qty: 270 CAPSULE | Refills: 1 | Status: SHIPPED | OUTPATIENT
Start: 2019-01-30 | End: 2019-03-27 | Stop reason: SDUPTHER

## 2019-01-30 RX ORDER — CYCLOBENZAPRINE HCL 10 MG
TABLET ORAL
Qty: 45 TABLET | Refills: 1 | Status: SHIPPED | OUTPATIENT
Start: 2019-01-30 | End: 2019-03-27 | Stop reason: SDUPTHER

## 2019-01-30 NOTE — PROGRESS NOTES
1/30: Patient has not returned since last scheduled appointment on 1/10/19  Called on 1/15/19 and advised to schedule more visits, however, did not return call  To be discharged at present time

## 2019-01-30 NOTE — PROGRESS NOTES
Assessment:  1  Spondylosis of cervical region without myelopathy or radiculopathy    2  Neck pain    3  Cervical myofascial pain syndrome    4  Paresthesia of right upper extremity        Plan: At this time since the patient is noting some increased pain symptoms I will increase his gabapentin to 3 tablets 3 times a day  He was given instruction on how to safely increase the dose  I would like the patient to try and slightly increase his muscle relaxer to half a tablet in the morning and a full tablet at bedtime  If it makes him too tired he will just remain at 1 tablet at bedtime  The patient tells me that he likes physical therapy for the 1st few hours however then it makes his pain symptoms worse  I feel might be beneficial to begin the patient in an aqua therapy program to strengthen his muscles  Patient will attend once or twice a week for 4-6 weeks  Continue with at home TENS unit    Follow-up visit in 8 weeks for re-evaluation                [place] Prescription Drug Monitoring Program report was reviewed and was appropriate         My impressions and treatment recommendations were discussed in detail with the patient who verbalized understanding and had no further questions  Discharge instructions were provided  I personally saw and examined the patient and I agree with the above discussed plan of care      Orders Placed This Encounter   Procedures    Ambulatory referral to Physical Therapy     Standing Status:   Future     Standing Expiration Date:   1/30/2020     Referral Priority:   Routine     Referral Type:   Physical Therapy     Referral Reason:   Specialty Services Required     Requested Specialty:   Physical Therapy     Number of Visits Requested:   1     Expiration Date:   1/30/2020     New Medications Ordered This Visit   Medications    gabapentin (NEURONTIN) 300 mg capsule     Sig: Take 3 capsules (900 mg total) by mouth 3 (three) times a day     Dispense:  270 capsule Refill:  1    cyclobenzaprine (FLEXERIL) 10 mg tablet     Sig: Take 1/2 tablet in the morning and 1 tablet at bedtime     Dispense:  45 tablet     Refill:  1       History of Present Illness:    Pinky Shane is a 44 y o  male who presents for follow-up related to his chronic neck and right arm pain  The patient reports his pain is 7/10 this is intermittent in nature most bothersome in the morning and at night  He describes his pain as burning, dull, aching, sharp, and pressure-like  Patient does take gabapentin 300 mg 2 tablets 3 times a day  The patient tells me that the medication is not working as well as it was originally  Patient also takes cyclobenzaprine 10 mg at bedtime his medications provide 25% relief without any side effects or issues  Patient was in the emergency room on December 17, 2018 for strain of his left lower back following a slipped on stairs  They did begin the patient on ibuprofen and start him in physical therapy  The patient tells me that his physical therapy ran out last week  I have personally reviewed and/or updated the patient's past medical history, past surgical history, family history, social history, current medications, allergies, and vital signs today  Review of Systems:    Review of Systems   Respiratory: Negative for shortness of breath  Cardiovascular: Negative for chest pain  Gastrointestinal: Negative for constipation, diarrhea, nausea and vomiting  Musculoskeletal: Positive for joint swelling (left shoulder) and myalgias  Negative for arthralgias and gait problem  Skin: Negative for rash  Neurological: Positive for dizziness  Negative for seizures and weakness  Psychiatric/Behavioral: Positive for decreased concentration  All other systems reviewed and are negative        Patient Active Problem List   Diagnosis    Neck pain    Cervical myofascial pain syndrome    Spondylosis of cervical region without myelopathy or radiculopathy    Paresthesia of right upper extremity       Past Medical History:   Diagnosis Date    Chronic pain disorder     low back and neck    Rectal bleeding     Right lumbar radiculopathy        Past Surgical History:   Procedure Laterality Date    KNEE ARTHROSCOPY Left     knee    MN COLONOSCOPY FLX DX W/COLLJ SPEC WHEN PFRMD N/A 5/2/2017    Procedure: COLONOSCOPY with polypectomies;  Surgeon: Kaia Castellano MD;  Location: [facility]; Service: General       Family History   Problem Relation Age of Onset    Cancer Mother        Social History     Occupational History    Not on file  Social History Main Topics    Smoking status: Never Smoker    Smokeless tobacco: Never Used    Alcohol use Yes      Comment: socially    Drug use: Yes     Types: Marijuana      Comment: daily    Sexual activity: Not on file       Current Outpatient Prescriptions on File Prior to Visit   Medication Sig    ibuprofen (MOTRIN) 600 mg tablet Take 1 tablet (600 mg total) by mouth every 6 (six) hours as needed for mild pain for up to 10 days    [DISCONTINUED] cyclobenzaprine (FLEXERIL) 10 mg tablet Take 1 tablet (10 mg total) by mouth daily at bedtime    [DISCONTINUED] gabapentin (NEURONTIN) 300 mg capsule Take 2 capsules (600 mg total) by mouth 3 (three) times a day    [DISCONTINUED] lidocaine (LIDODERM) 5 % Apply 1 patch topically daily Remove & Discard patch within 12 hours or as directed by MD     No current facility-administered medications on file prior to visit  Allergies   Allergen Reactions    Pregabalin      Depression       Physical Exam:    /60   Pulse 92   Resp 12   Ht 5' 9" (1 753 m)   Wt 61 5 kg (135 lb 9 6 oz)   BMI 20 02 kg/m²     Constitutional: normal, well developed, well nourished, alert, in no distress and non-toxic and no overt pain behavior    Eyes: anicteric  HEENT: grossly intact  Neck: supple, symmetric, trachea midline and no masses   Pulmonary:even and unlabored  Cardiovascular:No edema or pitting edema present  Skin:Normal without rashes or lesions and well hydrated  Psychiatric:Mood and affect appropriate  Neurologic:Cranial Nerves II-XII grossly intact  Musculoskeletal:normal    Imaging

## 2019-01-31 ENCOUNTER — OFFICE VISIT (OUTPATIENT)
Dept: BEHAVIORAL/MENTAL HEALTH CLINIC | Facility: CLINIC | Age: 44
End: 2019-01-31
Payer: COMMERCIAL

## 2019-01-31 ENCOUNTER — DOCUMENTATION (OUTPATIENT)
Dept: BEHAVIORAL/MENTAL HEALTH CLINIC | Facility: CLINIC | Age: 44
End: 2019-01-31

## 2019-01-31 DIAGNOSIS — F32.A MILD DEPRESSION: Primary | ICD-10-CM

## 2019-01-31 PROCEDURE — 90791 PSYCH DIAGNOSTIC EVALUATION: CPT | Performed by: COUNSELOR
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
## 2019-01-31 NOTE — PSYCH
Macel Carrier  1975       Date of Initial Treatment Plan: 1/31/19   Date of Current Treatment Plan: 01/31/19    Treatment Plan Number 1     Strengths/Personal Resources for Self Care: My girlfriend is a great support     Diagnosis:   1  Mild depression ([address] )         Area of Needs: Depressed and needs depression symptoms to go away and needs to gain trust with new therapist       Long Term Goal 1: Elizabeth not feel depressed     Target Date: 5-31-19  Completion Date:          Short Term Objectives for Goal 1: A Tens unit, pain management, Gabapentin for pain B: Will start aqua therapy for pain C: will start weekly therapy for depression     Long Term Goal 2: N/A    Target Date: N/A  Completion Date: N/A    Short Term Objectives for Goal 2: N/A         Long Term Goal # 3: N/A     Target Date: N/A  Completion Date: N/A    Short Term Objectives for Goal 3: N/A    GOAL 1: Modality: The person(s) responsible for carrying out the plan is  Rosio    GOAL 2: Modality: Individual 4 and will consider PHP x per month   Completion Date TBD     GOAL 3: Modality: Family As needed      [address]: Diagnosis and Treatment Plan explained to [address] [place] [address] relates understanding diagnosis and is agreeable to Treatment Plan         Client Comments : Please share your thoughts, feelings, need and/or experiences regarding your treatment plan:       __________________________________________________________________    __________________________________________________________________    __________________________________________________________________    __________________________________________________________________    _______________________________________                Patient signature, Date Time: __________________________________________             Physician cosigner signature, Date, Time: ________________________________

## 2019-02-19 ENCOUNTER — OFFICE VISIT (OUTPATIENT)
Dept: BEHAVIORAL/MENTAL HEALTH CLINIC | Facility: CLINIC | Age: 44
End: 2019-02-19
Payer: COMMERCIAL

## 2019-02-19 DIAGNOSIS — F32.A MILD DEPRESSION: Primary | ICD-10-CM

## 2019-02-19 PROCEDURE — 90834 PSYTX W PT 45 MINUTES: CPT | Performed by: COUNSELOR

## 2019-02-19 NOTE — PSYCH
Psychotherapy Provided: Individual Psychotherapy 46 minutes     Length of time in session: 46 minutes, follow up in 2 week    Goals addressed in session: Goal 1     Pain:      moderate to severe    4    Current suicide risk : Low     D: Elisabet Geronimo shares that he continues to deal with severe pain and is followed by [facility]  His son (5th grade) has recently been in trouble at school which is unlike him and Robertyael Grazyna talked at length about his parenting and his own childhood and his dad's not being there as he worked all the time and mom being busy with the others kids and not speaking [place]  Remembers at senior day his coaches walked him out on field cause parents were not there  We talked about his school days and then getting mixed up with wrong crowd and he cried as he talked about wanting so much better and different for his kids  Elisabet Geronimo was able to express sadness about this  He started to share about his years in half-way and how that also shaped and changed him  On a positive note he went to a wrestling match for 1st time in about 6 years and LOVED IT! Had a blast and said he was full of emile for 2 days after  We talked at length about doing things that bring him emile  A: Cried happy tears and sad tears today  Loves helping people, loves mentoring kids  P: See in 2 weeks; validate progress  Continue CBT work as well as wellness toolkit development  Behavioral Health Treatment Plan [facility]: Diagnosis and Treatment Plan explained to [address] relates understanding diagnosis and is agreeable to Treatment Plan   Yes

## 2019-02-26 ENCOUNTER — OFFICE VISIT (OUTPATIENT)
Dept: BEHAVIORAL/MENTAL HEALTH CLINIC | Facility: CLINIC | Age: 44
End: 2019-02-26
Payer: COMMERCIAL

## 2019-02-26 DIAGNOSIS — F32.A MILD DEPRESSION: Primary | ICD-10-CM

## 2019-02-26 PROCEDURE — 90834 PSYTX W PT 45 MINUTES: CPT | Performed by: COUNSELOR

## 2019-02-26 NOTE — PSYCH
Psychotherapy Provided: Individual Psychotherapy 50 minutes     Length of time in session: 50 minutes, follow up in 2 week    Goals addressed in session: Goal 1     Pain:      moderate to severe    8    Current suicide risk : Low     D: Neck and head pain continues for [facility]  This makes it difficult for him to leave his house  He was going to go to [employer] last weekend but head hurt too bad  We talked today about control vs power and emotionally what he does with those 2 words that seem to keep him stuck  We likened this to sports as he was a  for many years and understands things in a control vs power sports metaphor  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, 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  We then discussed himself and his own inner child and needing to put that kind of energy into himself  We used CBT at length today  A: Pain level high, Happy about his family and his life in general  Pain really makes him depressed  P: He has been putting off water PT as has been prescribed and agreed to leave session and go home and schedule this as a way to take some personal power back over his pain,  Behavioral Health Treatment Plan [facility]: Diagnosis and Treatment Plan explained to [address] relates understanding diagnosis and is agreeable to Treatment Plan   Yes

## 2019-03-07 ENCOUNTER — OFFICE VISIT (OUTPATIENT)
Dept: BEHAVIORAL/MENTAL HEALTH CLINIC | Facility: CLINIC | Age: 44
End: 2019-03-07
Payer: COMMERCIAL

## 2019-03-07 DIAGNOSIS — F32.A MILD DEPRESSION: Primary | ICD-10-CM

## 2019-03-07 PROCEDURE — 90834 PSYTX W PT 45 MINUTES: CPT | Performed by: COUNSELOR

## 2019-03-07 NOTE — PSYCH
Psychotherapy Provided: Individual Psychotherapy 55 minutes     Length of time in session: 55 minutes, follow up in 2 week    Goals addressed in session: Goal 1     Pain:      moderate to severe    7    Current suicide risk : Low     D: [facility] is in tears today - pain level high  Starts aqua therapy tomorrow  Wants 2nd opinion with pain mgmt so I gave him name of Dr Ernestine Jiménez  He says that all of his buddies are working and driving and are productive and he feels like a "burdon"  We did extensive CBT around his negative self talk about uselessness  Processed recent request by son's  to have [facility] help  - [facility] says he can't stand longer than 30 minutes so can't do it  I told him to use a chair and go and sit and   Just because he used to  hands on and can't now does not mean he can't be an amazing   We processed this at length  A: Depressed secondary to chronic pain; denies sx or hx ideation, plan or intent  P: See in 2 weeks; he will start water therapy tomorrow and go to  practice Tuesday  Behavioral Health Treatment Plan [facility]: Diagnosis and Treatment Plan explained to [address] relates understanding diagnosis and is agreeable to Treatment Plan   Yes

## 2019-03-08 ENCOUNTER — EVALUATION (OUTPATIENT)
Dept: PHYSICAL THERAPY | Facility: REHABILITATION | Age: 44
End: 2019-03-08
Payer: COMMERCIAL

## 2019-03-08 DIAGNOSIS — M47.812 SPONDYLOSIS OF CERVICAL REGION WITHOUT MYELOPATHY OR RADICULOPATHY: Primary | ICD-10-CM

## 2019-03-08 DIAGNOSIS — M54.2 NECK PAIN: ICD-10-CM

## 2019-03-08 DIAGNOSIS — M79.18 CERVICAL MYOFASCIAL PAIN SYNDROME: ICD-10-CM

## 2019-03-08 PROCEDURE — 97162 PT EVAL MOD COMPLEX 30 MIN: CPT | Performed by: PHYSICAL THERAPIST

## 2019-03-08 PROCEDURE — G8990 OTHER PT/OT CURRENT STATUS: HCPCS | Performed by: PHYSICAL THERAPIST

## 2019-03-08 PROCEDURE — G8991 OTHER PT/OT GOAL STATUS: HCPCS | Performed by: PHYSICAL THERAPIST

## 2019-03-08 PROCEDURE — 97110 THERAPEUTIC EXERCISES: CPT | Performed by: PHYSICAL THERAPIST

## 2019-03-08 NOTE — PROGRESS NOTES
PT Evaluation     Today's date: 3/8/2019  Patient name: Sherrie Smith  : 1975  MRN: [id number]  Referring provider: LANA Vargas  Dx:   Encounter Diagnosis     ICD-10-CM    1  Spondylosis of cervical region without myelopathy or radiculopathy M47 812 Ambulatory referral to Physical Therapy   2  Neck pain M54 2 Ambulatory referral to Physical Therapy   3  Cervical myofascial pain syndrome M79 18 Ambulatory referral to Physical Therapy            Pt was treated by PHIL Paz under direct supervision of Talib Hoskins, PT, MSPT, OCS  Assessment  Assessment details: Pt presents to PT with cervical pain and L arm numbness/tingling secondary to decreased cervical ROM, poor posture and neural tension of ulnar/median nerves  Additionally, pt is hypersensitive to touch over upper/middle trap on L side  While pt has decreased strength, pt is not appropriate for aquatic therapy for his current impairments  Pt will benefit from skilled PT in order to improve strength, ROM and decrease pain with ADLs  Impairments: abnormal muscle firing, abnormal or restricted ROM, activity intolerance, lacks appropriate home exercise program, pain with function, poor posture  and poor body mechanics  Functional limitations: Pt has difficulty with looking up and any overhead activity  Symptom irritability: moderateBarriers to therapy: Pt has had chronic pain for 11 years   Understanding of Dx/Px/POC: fair   Prognosis: fair    Goals  In 2 weeks pt will:  -Be independent with phase I of HEP  -Pt will improve C-spine ROM by 5 degrees  -pt will report resting pain <4/10     In 6 weeks pt will:  -Be independent with Phase II of HEP  -Improve FOTO score to >50  -have full active AROM of cervical spine    Plan  Plan details: Pt was given HEP  All questions were answered to pt's satisfaction    Patient would benefit from: skilled physical therapy  Planned modality interventions: cryotherapy  Planned therapy interventions: manual therapy, body mechanics training, functional ROM exercises, home exercise program, therapeutic exercise, therapeutic activities, postural training, patient education, neuromuscular re-education, coordination, joint mobilization and flexibility  Frequency: 2x week  Duration in weeks: 6  Plan of Care beginning date: 3/8/2019  Plan of Care expiration date: 2019  Treatment plan discussed with: patient        Subjective Evaluation    History of Present Illness  Date of onset: 2017  Mechanism of injury: Pt reports he was slammed into a wall while carrying furniture in , with back and neck pain getting worse in the last 18 months  Pt reports decreased ROM and strength in neck, as well as shooting pain into L arm down to L hand in all fingers  Pt reports looking over L shoulder is most painful motion and that it causes shooting into L UE  Pt has difficulty with looking up and any overhead activity  Pt has had PT for neck and back pain within last year but states that it did not relieve his symptoms for more than a few hours  MRI in the past has showed mild DDD and an EMG showed ulnar neuropathy at the elbow  Pain  Current pain ratin  At best pain ratin  At worst pain rating: 10  Location: cervical spine and L UE  Quality: sharp and dull ache  Relieving factors: medications, relaxation, ice and heat  Aggravating factors: overhead activity  Progression: worsening    Hand dominance: left    Treatments  Previous treatment: physical therapy  Patient Goals  Patient goals for therapy: increased strength, independence with ADLs/IADLs, return to sport/leisure activities, increased motion, improved balance and decreased pain          Objective     Concurrent Complaints  Positive for night pain, disturbed sleep, dizziness and headaches   Negative for faints, nausea/motion sickness, tinnitus, trouble swallowing and visual change    Additional Special Questions  Dizziness related to positional change (sit to stand)  Neurological Testing     Reflexes   Left   Biceps (C5/C6): normal (2+)  Brachioradialis (C6): normal (2+)  Triceps (C7): absent (0)    Right   Biceps (C5/C6): normal (2+)  Brachioradialis (C6): normal (2+)  Triceps (C7): absent (0)    Active Range of Motion   Cervical/Thoracic Spine       Cervical    Flexion: 25 degrees   Extension: 35 degrees     with pain  Left rotation: 41 degrees  Right rotation: 35 degrees         Thoracic    Extension:  Restriction level: moderate    Passive Range of Motion     Additional Passive Range of Motion Details  Pain with pressure on R C3 (L sided pain)    Strength/Myotome Testing   Cervical Spine     Left   Interossei strength (t1): 4    Right   Interossei strength (t1): 4    Left Shoulder     Planes of Motion   Abduction: 4     Right Shoulder     Planes of Motion   Abduction: 4     Left Elbow   Flexion: 4  Extension: 4    Right Elbow   Flexion: 5  Extension: 5    Tests   Cervical   Negative alar ligament test and Sharp-Prateek test      Left   Negative Spurling's Test A  Right   Negative Spurling's Test A  Left Shoulder   Positive ULTT1 and ULTT4  Negative ULTT3  Right Shoulder   Negative ULTT1, ULTT3 and ULTT4  Neuro Exam:     Headaches   Patient reports headaches: Yes  Precautions: depression/anxiety, OA, chronic pain      Daily Treatment Diary     Manual              STM: SOR, upper/middle trap             IASTM-upper/midddle trap in future             Cervical traction             C-spine mobs prn                              Exercise Diary              Arm bike-retro             DNF w/BP cuff             Median/unlar nerve gliders             c/s sidebending/rotation isometrics (hand) b/l             pec doorway stretch             Rows w/ TB             Lat pull down w/ TB             Serratus punches             Serratus wall slides             Sitting posture pball             Shoulder circles w/towel CW/CCW TB b/l ER with scap retraction                                                                                                                         Modalities               PRN

## 2019-03-12 ENCOUNTER — OFFICE VISIT (OUTPATIENT)
Dept: PHYSICAL THERAPY | Facility: REHABILITATION | Age: 44
End: 2019-03-12
Payer: COMMERCIAL

## 2019-03-12 DIAGNOSIS — M47.812 SPONDYLOSIS OF CERVICAL REGION WITHOUT MYELOPATHY OR RADICULOPATHY: Primary | ICD-10-CM

## 2019-03-12 PROCEDURE — 97110 THERAPEUTIC EXERCISES: CPT | Performed by: PHYSICAL THERAPIST

## 2019-03-12 PROCEDURE — 97112 NEUROMUSCULAR REEDUCATION: CPT | Performed by: PHYSICAL THERAPIST

## 2019-03-12 PROCEDURE — 97140 MANUAL THERAPY 1/> REGIONS: CPT | Performed by: PHYSICAL THERAPIST

## 2019-03-12 NOTE — PROGRESS NOTES
Daily Note     Today's date: 3/12/2019  Patient name: Suni Vazquez  : 1975  MRN: [id number]  Referring provider: LANA Jha  Dx:   Encounter Diagnosis     ICD-10-CM    1  Spondylosis of cervical region without myelopathy or radiculopathy M47 812          Pt was treated by PHIL Paz under direct supervision of Talib Hoskins, PT, MSPT, OCS       1on1 [id number]  Subjective: Pt reports pain is as usual  Pt states he was sore in neck/traps after evaluation      Objective: See treatment diary below    Manual  3/12            STM: SOR, upper/middle trap CURRY            IASTM-upper/midddle trap in future IASTM L T/S paraspinals            Cervical traction np            C-spine mobs prn np            L 1st rib depression CURRY                Exercise Diary  3/12            Arm bike-retro 5'            DNF w/BP cuff np            Median/unlar nerve gliders 2x1 min ea            c/s sidebending/rotation isometrics (hand) b/l np            pec doorway stretch 3x30s            Rows w/ TB 2x10 OTB            Lat pull down w/ TB 2x10 OTB            Serratus punches np            Serratus wall slides 5"x15            Sitting posture pball np            Shoulder circles w/towel CW/CCW np            TB b/l ER with scap retraction np                                                                                                                        Modalities              MH PRN                                           Assessment: Tolerated treatment well  Patient would benefit from continued PT and had tingling in L hand with pressure to L levator scap  Sensation was eliminated when inferior first rib mob was applied  Monitor response NV  Plan: Continue per plan of care  Progress treatment as tolerated

## 2019-03-14 ENCOUNTER — OFFICE VISIT (OUTPATIENT)
Dept: BEHAVIORAL/MENTAL HEALTH CLINIC | Facility: CLINIC | Age: 44
End: 2019-03-14
Payer: COMMERCIAL

## 2019-03-14 DIAGNOSIS — F32.A MILD DEPRESSION: Primary | ICD-10-CM

## 2019-03-14 PROCEDURE — 90834 PSYTX W PT 45 MINUTES: CPT | Performed by: COUNSELOR

## 2019-03-14 NOTE — PSYCH
Psychotherapy Provided: Individual Psychotherapy 45 minutes     Length of time in session: 45 minutes, follow up in 3 week    Goals addressed in session: Goal 1     Pain:      moderate to severe    4    Current suicide risk : Low     D: Ramone and I met for session  He shares that he has continued to feel depressed but denies sx or hx ideation, plan or intent  Jorge Posada shares that he cries a lot and "overthinks and overworries" about things  We talked at length about his self talk and worrying and did extensive CBT today related to parents, nephews, physical pain  Started PT and is hopeful that it will help  Going back tomorrow  Processed his decline in self care - has not had a hair cut in a year, has not shaved lately  We talked about feeling good by looking and doing good - sometimes not waiting until we feel 100% to do things like see friends or get hair cuts  -because those things may help us to get to 100% wellness  A: Funny, 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

## 2019-03-15 ENCOUNTER — OFFICE VISIT (OUTPATIENT)
Dept: PHYSICAL THERAPY | Facility: REHABILITATION | Age: 44
End: 2019-03-15
Payer: COMMERCIAL

## 2019-03-15 DIAGNOSIS — M79.18 CERVICAL MYOFASCIAL PAIN SYNDROME: ICD-10-CM

## 2019-03-15 DIAGNOSIS — M54.2 NECK PAIN: ICD-10-CM

## 2019-03-15 DIAGNOSIS — M47.812 SPONDYLOSIS OF CERVICAL REGION WITHOUT MYELOPATHY OR RADICULOPATHY: Primary | ICD-10-CM

## 2019-03-15 PROCEDURE — 97112 NEUROMUSCULAR REEDUCATION: CPT | Performed by: PHYSICAL THERAPIST

## 2019-03-15 PROCEDURE — 97110 THERAPEUTIC EXERCISES: CPT | Performed by: PHYSICAL THERAPIST

## 2019-03-15 PROCEDURE — 97140 MANUAL THERAPY 1/> REGIONS: CPT | Performed by: PHYSICAL THERAPIST

## 2019-03-15 NOTE — PROGRESS NOTES
Daily Note     Today's date: 3/15/2019  Patient name: Cheikh Zapata  : 1975  MRN: [id number]  Referring provider: Lazarus Diver, CRNP  Dx:   Encounter Diagnosis     ICD-10-CM    1  Spondylosis of cervical region without myelopathy or radiculopathy M47 812    2  Neck pain M54 2    3  Cervical myofascial pain syndrome M79 18            1on1 entire session  Subjective: Pt states that after leaving last PT session he felt relief in L neck/upper trap pain for "the first time in years" and that he has not had numbness/pain in L UE since last session      Objective: See treatment diary below    Manual  3/12 3/15           STM: SOR, upper/middle trap CURRY CURRY           IASTM-upper/midddle trap in future IASTM L T/S paraspinals IASTM L upper trap           Cervical traction np np           C-spine mobs prn np np           L 1st rib depression CURRY CURRY               Exercise Diary  3/12 3/15           Arm bike-retro 5' 6'           DNF w/BP cuff np np           Median/unlar nerve gliders 2x1 min ea np           c/s sidebending/rotation isometrics (hand) b/l np np           pec doorway stretch 3x30s 3x30s           Rows w/ TB 2x10 OTB 2x10 OTB           Lat pull down w/ TB 2x10 OTB 2x10 OTB           Serratus punches np np           Serratus wall slides 5"x15 5"x15           Sitting posture pball np np           Shoulder circles w/towel CW/CCW np np           TB b/l ER with scap retraction np np                                                                                                                       Modalities              MH PRN                                           Assessment: Tolerated treatment well  Patient demonstrated fatigue post treatment, would benefit from continued PT and will benefit from continued emphasis on STM and MFTPR as trigger point is L levator scap appears to be source of neck pain  Plan: Continue per plan of care  Progress treatment as tolerated

## 2019-03-20 ENCOUNTER — OFFICE VISIT (OUTPATIENT)
Dept: PHYSICAL THERAPY | Facility: REHABILITATION | Age: 44
End: 2019-03-20
Payer: COMMERCIAL

## 2019-03-20 DIAGNOSIS — M54.2 NECK PAIN: ICD-10-CM

## 2019-03-20 DIAGNOSIS — M79.18 CERVICAL MYOFASCIAL PAIN SYNDROME: ICD-10-CM

## 2019-03-20 DIAGNOSIS — M47.812 SPONDYLOSIS OF CERVICAL REGION WITHOUT MYELOPATHY OR RADICULOPATHY: Primary | ICD-10-CM

## 2019-03-20 PROCEDURE — 97140 MANUAL THERAPY 1/> REGIONS: CPT | Performed by: PHYSICAL THERAPIST

## 2019-03-20 PROCEDURE — 97110 THERAPEUTIC EXERCISES: CPT | Performed by: PHYSICAL THERAPIST

## 2019-03-20 PROCEDURE — 97112 NEUROMUSCULAR REEDUCATION: CPT | Performed by: PHYSICAL THERAPIST

## 2019-03-20 NOTE — PROGRESS NOTES
Daily Note     Today's date: 3/20/2019  Patient name: Pinky Shane  : 1975  MRN: [id number]  Referring provider: LANA Price  Dx:   Encounter Diagnosis     ICD-10-CM    1  Spondylosis of cervical region without myelopathy or radiculopathy M47 812    2  Neck pain M54 2    3  Cervical myofascial pain syndrome M79 18                Pt was treated by PHIL Paz under direct supervision of [facility], PT, MSPT, OCS  Subjective: Pt reports feeling relief after last session until that evening  Objective: See treatment diary below    Manual  3/12 3/15 3/20          STM: SOR, upper/middle trap CURRY CURRY CURRY          IASTM-upper/midddle trap in future IASTM L T/S paraspinals IASTM L upper trap np          Cervical traction np np np          C-spine mobs prn np np np          SLJ muscle energy   CURRY          L 1st rib depression CURRY CURRY CURRY              Exercise Diary  3/12 3/15 3/20          Arm bike-retro 5' 6' 7'          DNF w/BP cuff np np np          Median/unlar nerve gliders 2x1 min ea np np          c/s sidebending/rotation isometrics (hand) b/l np np np          pec doorway stretch 3x30s 3x30s 3x30s          Rows w/ TB 2x10 OTB 2x10 OTB 2x10 GTB          Lat pull down w/ TB 2x10 OTB 2x10 OTB 2x10 GTB          Serratus punches np np np          Serratus wall slides 5"x15 5"x15 5"x12          Sitting posture pball np np np          Shoulder circles w/towel CW/CCW np np x10 ea          TB b/l ER with scap retraction np np np                                                                                                                      Modalities              MH PRN                              Assessment: Tolerated treatment well  Patient demonstrated fatigue post treatment, would benefit from continued PT and was able to perform more reps with therex, but continues to demonstrate poor posture in sitting and standing  Plan: Continue per plan of care    Progress treatment as tolerated

## 2019-03-22 ENCOUNTER — OFFICE VISIT (OUTPATIENT)
Dept: PHYSICAL THERAPY | Facility: REHABILITATION | Age: 44
End: 2019-03-22
Payer: COMMERCIAL

## 2019-03-22 DIAGNOSIS — M47.812 SPONDYLOSIS OF CERVICAL REGION WITHOUT MYELOPATHY OR RADICULOPATHY: Primary | ICD-10-CM

## 2019-03-22 PROCEDURE — 97112 NEUROMUSCULAR REEDUCATION: CPT | Performed by: PHYSICAL THERAPIST

## 2019-03-22 PROCEDURE — 97140 MANUAL THERAPY 1/> REGIONS: CPT | Performed by: PHYSICAL THERAPIST

## 2019-03-22 PROCEDURE — 97110 THERAPEUTIC EXERCISES: CPT | Performed by: PHYSICAL THERAPIST

## 2019-03-22 NOTE — PROGRESS NOTES
Daily Note     Today's date: 3/22/2019  Patient name: Anamika Herzog  : 1975  MRN: [id number]  Referring provider: LANA Freeman  Dx:   Encounter Diagnosis     ICD-10-CM    1  Spondylosis of cervical region without myelopathy or radiculopathy M47 812             Pt was treated by PHIL Paz under direct supervision of Talib Hoskins, PT, MSPT, OCS   1on1 entire session  Subjective: Pt reports decreased pain from last last session until he goes to bed  Objective: See treatment diary below    Manual  3/12 3/15 3/20 3/22         STM: SOR, upper/middle trap CURRY CURRY CURRY CURRY         IASTM-upper/midddle trap in future IASTM L T/S paraspinals IASTM L upper trap np CURRY         Cervical traction np np np np         C-spine mobs prn np np np np         SLJ muscle energy   CURRY CURRY         L 1st rib depression CURRY CURRY CURRY np             Exercise Diary  3/12 3/15 3/20 3/22         Arm bike-retro 5' 6' 7' 7'         DNF w/BP cuff np np np np         Median/unlar nerve gliders 2x1 min ea np np np         c/s sidebending/rotation isometrics (hand) b/l np np np np         pec doorway stretch 3x30s 3x30s 3x30s 3x30s         Rows w/ TB 2x10 OTB 2x10 OTB 2x10 GTB 2x10 GTB         Lat pull down w/ TB 2x10 OTB 2x10 OTB 2x10 GTB 2x10 GTB         Serratus punches np np np np         Serratus wall slides 5"x15 5"x15 5"x12 5"   2x10         Sitting posture pball np np np np         Shoulder circles w/towel CW/CCW np np x10 ea np         TB b/l ER with scap retraction np np np np                                                                                                                     Modalities              MH PRN                              Assessment: Tolerated treatment well   Patient demonstrated fatigue post treatment, would benefit from continued PT and Pt has increased tolerance with resistance exerices and reports relief after treatment but continues to have increased muscle tone in cervicothoracic region      Plan: Continue per plan of care  Progress treatment as tolerated

## 2019-03-27 ENCOUNTER — OFFICE VISIT (OUTPATIENT)
Dept: PAIN MEDICINE | Facility: MEDICAL CENTER | Age: 44
End: 2019-03-27
Payer: COMMERCIAL

## 2019-03-27 VITALS
SYSTOLIC BLOOD PRESSURE: 140 MMHG | HEART RATE: 84 BPM | DIASTOLIC BLOOD PRESSURE: 76 MMHG | BODY MASS INDEX: 20.11 KG/M2 | RESPIRATION RATE: 12 BRPM | HEIGHT: 69 IN | WEIGHT: 135.8 LBS

## 2019-03-27 DIAGNOSIS — R20.2 PARESTHESIA OF RIGHT UPPER EXTREMITY: ICD-10-CM

## 2019-03-27 DIAGNOSIS — M47.812 SPONDYLOSIS OF CERVICAL REGION WITHOUT MYELOPATHY OR RADICULOPATHY: Primary | ICD-10-CM

## 2019-03-27 DIAGNOSIS — M79.18 CERVICAL MYOFASCIAL PAIN SYNDROME: ICD-10-CM

## 2019-03-27 DIAGNOSIS — M54.2 NECK PAIN: ICD-10-CM

## 2019-03-27 PROCEDURE — 99213 OFFICE O/P EST LOW 20 MIN: CPT | Performed by: NURSE PRACTITIONER

## 2019-03-27 RX ORDER — NORTRIPTYLINE HYDROCHLORIDE 10 MG/1
10 CAPSULE ORAL
Qty: 30 CAPSULE | Refills: 2 | Status: SHIPPED | OUTPATIENT
Start: 2019-03-27 | End: 2020-06-03

## 2019-03-27 RX ORDER — CYCLOBENZAPRINE HCL 10 MG
TABLET ORAL
Qty: 45 TABLET | Refills: 2 | Status: SHIPPED | OUTPATIENT
Start: 2019-03-27 | End: 2020-06-03

## 2019-03-27 RX ORDER — GABAPENTIN 300 MG/1
900 CAPSULE ORAL 3 TIMES DAILY
Qty: 270 CAPSULE | Refills: 2 | Status: SHIPPED | OUTPATIENT
Start: 2019-03-27 | End: 2020-06-03

## 2019-03-27 NOTE — PROGRESS NOTES
Assessment  1  Spondylosis of cervical region without myelopathy or radiculopathy    2  Neck pain    3  Cervical myofascial pain syndrome    4  Paresthesia of right upper extremity        Plan  Continue with cyclobenzaprine and gabapentin as prescribed both of these medications were refilled today  I will add nortriptyline 10 mg at bedtime to try and provide more adequate relief of his neuropathic pain symptoms  Continue with physical therapy  Recommend cognitive behavioral therapy with his psychiatrist   I do feel that anxiety is playing a role in his pain symptoms  Follow-up in 12 weeks for medication refills      [facility] Prescription Drug Monitoring Program report was reviewed and was appropriate         My impressions and treatment recommendations were discussed in detail with the patient who verbalized understanding and had no further questions  Discharge instructions were provided  I personally saw and examined the patient and I agree with the above discussed plan of care  No orders of the defined types were placed in this encounter  New Medications Ordered This Visit   Medications    cyclobenzaprine (FLEXERIL) 10 mg tablet     Sig: Take 1/2 tablet in the morning and 1 tablet at bedtime     Dispense:  45 tablet     Refill:  2    gabapentin (NEURONTIN) 300 mg capsule     Sig: Take 3 capsules (900 mg total) by mouth 3 (three) times a day     Dispense:  270 capsule     Refill:  2    nortriptyline (PAMELOR) 10 mg capsule     Sig: Take 1 capsule (10 mg total) by mouth daily at bedtime     Dispense:  30 capsule     Refill:  2       History of Present Illness    Marielena Hernandez is a 38 y o  male who presents for follow-up related to his chronic neck pain  Patient rates his pain 8/10 this is constant in nature most bothersome in the morning and at night  He describes his pain as burning, dull, aching, pressure-like, and pins and needles    He reports that his pain symptoms continue to worsen  Patient is taking cyclobenzaprine 10 mg half tablet in the morning in full tablet bedtime along with gabapentin 900 mg 3 times daily he reports 20% relief with memory loss is only side effect  Patient continues to participate in physical therapy  He is also using his at home TENS unit  Patient tells me that he began seeing a psychiatrist as he is having a hard time differentiated in real life from dreams  Patient strongly feels that it 1 time someone told him he has a tear in his neck that is dilating on his MRI  I did bring up his cervical spine MRI today and showed him that it shows degenerative changes throughout consistent with some arthritis in his neck no disc herniations no stenosis and no tear  Patient also tells me that he feels like someone is pushing on the front of his throat which would not be related to his cervical spondylosis or myofascial pain  I did discuss with him that I have not heard him report this symptom prior to today however he tells me as he did after the injection in his neck last year and this is what he is talking about he is not sure if this is something that he dreams or it is really happening  I have personally reviewed and/or updated the patient's past medical history, past surgical history, family history, social history, current medications, allergies, and vital signs today  Review of Systems   Respiratory: Negative for shortness of breath  Cardiovascular: Negative for chest pain  Gastrointestinal: Negative for constipation, diarrhea, nausea and vomiting  Musculoskeletal: Positive for joint swelling, myalgias and neck pain (left posterior extending to the shoulder)  Negative for arthralgias and gait problem  Skin: Negative for rash  Neurological: Positive for dizziness and weakness  Negative for seizures  Psychiatric/Behavioral: Positive for decreased concentration  All other systems reviewed and are negative        Patient Active Problem List   Diagnosis    Neck pain    Cervical myofascial pain syndrome    Spondylosis of cervical region without myelopathy or radiculopathy    Paresthesia of right upper extremity    Mild depression ([facility] [address] )       Past Medical History:   Diagnosis Date    Chronic pain disorder     low back and neck    Mild depression ([facility] [address] ) 1/31/2019    Rectal bleeding     Right lumbar radiculopathy        Past Surgical History:   Procedure Laterality Date    KNEE ARTHROSCOPY Left     knee    HI COLONOSCOPY FLX DX W/COLLJ SPEC WHEN PFRMD N/A 5/2/2017    Procedure: COLONOSCOPY with polypectomies;  Surgeon: Kaitlin Goldberg MD;  Location: [facility]; Service: General       Family History   Problem Relation Age of Onset    Cancer Mother        Social History     Occupational History    Not on file   Tobacco Use    Smoking status: Never Smoker    Smokeless tobacco: Never Used   Substance and Sexual Activity    Alcohol use: Yes     Comment: socially    Drug use: Yes     Types: Marijuana     Comment: daily    Sexual activity: Not on file       Current Outpatient Medications on File Prior to Visit   Medication Sig    [DISCONTINUED] cyclobenzaprine (FLEXERIL) 10 mg tablet Take 1/2 tablet in the morning and 1 tablet at bedtime    [DISCONTINUED] gabapentin (NEURONTIN) 300 mg capsule Take 3 capsules (900 mg total) by mouth 3 (three) times a day    [DISCONTINUED] ibuprofen (MOTRIN) 600 mg tablet Take 1 tablet (600 mg total) by mouth every 6 (six) hours as needed for mild pain for up to 10 days     No current facility-administered medications on file prior to visit  Allergies   Allergen Reactions    Pregabalin      Depression       Physical Exam    /76   Pulse 84   Resp 12   Ht 5' 9" (1 753 m)   Wt 61 6 kg (135 lb 12 8 oz)   BMI 20 05 kg/m²     Constitutional: normal, well developed, well nourished, alert, in no distress and non-toxic and no overt pain behavior    Eyes: anicteric  HEENT: grossly intact  Neck: supple, symmetric, trachea midline and no masses   Pulmonary:even and unlabored  Cardiovascular:No edema or pitting edema present  Skin:Normal without rashes or lesions and well hydrated  Psychiatric:Patient frustrated easily during office visit    Neurologic:Cranial Nerves II-XII grossly intact  Musculoskeletal:normal    Imaging

## 2019-04-01 ENCOUNTER — OFFICE VISIT (OUTPATIENT)
Dept: BEHAVIORAL/MENTAL HEALTH CLINIC | Facility: CLINIC | Age: 44
End: 2019-04-01
Payer: COMMERCIAL

## 2019-04-01 DIAGNOSIS — F32.A MILD DEPRESSION: Primary | ICD-10-CM

## 2019-04-01 PROCEDURE — 90834 PSYTX W PT 45 MINUTES: CPT | Performed by: COUNSELOR

## 2019-05-01 ENCOUNTER — OFFICE VISIT (OUTPATIENT)
Dept: BEHAVIORAL/MENTAL HEALTH CLINIC | Facility: CLINIC | Age: 44
End: 2019-05-01
Payer: COMMERCIAL

## 2019-05-01 DIAGNOSIS — F32.A MILD DEPRESSION: Primary | ICD-10-CM

## 2019-05-01 PROCEDURE — 90834 PSYTX W PT 45 MINUTES: CPT | Performed by: COUNSELOR

## 2019-05-16 ENCOUNTER — OFFICE VISIT (OUTPATIENT)
Dept: BEHAVIORAL/MENTAL HEALTH CLINIC | Facility: CLINIC | Age: 44
End: 2019-05-16
Payer: COMMERCIAL

## 2019-05-16 DIAGNOSIS — F32.A MILD DEPRESSION: Primary | ICD-10-CM

## 2019-05-16 PROCEDURE — 90834 PSYTX W PT 45 MINUTES: CPT | Performed by: COUNSELOR

## 2019-06-25 ENCOUNTER — SOCIAL WORK (OUTPATIENT)
Dept: BEHAVIORAL/MENTAL HEALTH CLINIC | Facility: CLINIC | Age: 44
End: 2019-06-25
Payer: COMMERCIAL

## 2019-06-25 DIAGNOSIS — F32.A MILD DEPRESSION: Primary | ICD-10-CM

## 2019-06-25 DIAGNOSIS — M54.2 NECK PAIN: ICD-10-CM

## 2019-06-25 PROCEDURE — 90834 PSYTX W PT 45 MINUTES: CPT | Performed by: COUNSELOR

## 2019-07-02 DIAGNOSIS — M79.18 CERVICAL MYOFASCIAL PAIN SYNDROME: ICD-10-CM

## 2019-07-02 DIAGNOSIS — R20.2 PARESTHESIA OF RIGHT UPPER EXTREMITY: ICD-10-CM

## 2019-07-02 DIAGNOSIS — M47.812 SPONDYLOSIS OF CERVICAL REGION WITHOUT MYELOPATHY OR RADICULOPATHY: ICD-10-CM

## 2019-07-03 RX ORDER — NORTRIPTYLINE HYDROCHLORIDE 10 MG/1
CAPSULE ORAL
Qty: 30 CAPSULE | Refills: 2 | OUTPATIENT
Start: 2019-07-03

## 2019-08-01 ENCOUNTER — SOCIAL WORK (OUTPATIENT)
Dept: BEHAVIORAL/MENTAL HEALTH CLINIC | Facility: CLINIC | Age: 44
End: 2019-08-01
Payer: COMMERCIAL

## 2019-08-01 DIAGNOSIS — F32.A MILD DEPRESSION: Primary | ICD-10-CM

## 2019-08-01 PROCEDURE — 90834 PSYTX W PT 45 MINUTES: CPT | Performed by: COUNSELOR

## 2019-08-01 NOTE — PSYCH
Psychotherapy Provided: Individual Psychotherapy 39 minutes     Length of time in session: 39 minutes, follow up in 1 month    Goals addressed in session: Goal 1     Pain:      moderate to severe    2    Current suicide risk : Low     D: Anabel shares that overall he is doing "ok"  Wife back at work so he is "Mr Indy Fernández" to their 6year old and their 11 week old sons again  He loves being a dad and we talked about his kids and how he remembers his childhood and how much he does not want his kids to have to go through what he went through  He remembers eating meat only once a week if that and if the had cereal with milk they all had to share their milk with each other after the cereal was gone  He remembered today being "lost" and the day in December he ended up going with the group of kids when the murder ended up happening  Recently his son asked him why he had been in [facility] and he told him that once Anabel feels his son is old enough to understand he will tell him  Ramone had tears today thinking about the life lost that day and we did CBT work to help him reframe some of his guilt  A: Getting out more, socializing more, happier  P: See monthly; update goals next session  Behavioral Health Treatment Plan [facility]: Diagnosis and Treatment Plan explained to [address] relates understanding diagnosis and is agreeable to Treatment Plan   Yes

## 2019-08-27 ENCOUNTER — SOCIAL WORK (OUTPATIENT)
Dept: BEHAVIORAL/MENTAL HEALTH CLINIC | Facility: CLINIC | Age: 44
End: 2019-08-27
Payer: COMMERCIAL

## 2019-08-27 DIAGNOSIS — F32.A MILD DEPRESSION: Primary | ICD-10-CM

## 2019-08-27 PROCEDURE — 90834 PSYTX W PT 45 MINUTES: CPT | Performed by: COUNSELOR

## 2019-08-27 NOTE — PSYCH
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 and is finding that he has lots of crying spells  Has a problems with crowds - gets anxiety  We did CBT at length to address depression and anxiety and ways that he can start to branch out and not isolate  He did go to [employer] football game but stayed away from people in the end zone  Sleep problems  A: Sad, depressed  P: See doc here tomorrow for eval, validate how well is is doing with   Encourage socialization  Work on tx plan update developed today     [address]: Diagnosis and Treatment Plan explained to [address] relates understanding diagnosis and is agreeable to Treatment Plan   Yes
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
## 2019-08-28 ENCOUNTER — OFFICE VISIT (OUTPATIENT)
Dept: PSYCHIATRY | Facility: CLINIC | Age: 44
End: 2019-08-28
Payer: COMMERCIAL

## 2019-08-28 DIAGNOSIS — G47.00 INSOMNIA, UNSPECIFIED TYPE: ICD-10-CM

## 2019-08-28 DIAGNOSIS — G89.29 OTHER CHRONIC PAIN: ICD-10-CM

## 2019-08-28 DIAGNOSIS — F33.2 MAJOR DEPRESSIVE DISORDER, RECURRENT SEVERE WITHOUT PSYCHOTIC FEATURES (HCC): Primary | ICD-10-CM

## 2019-08-28 DIAGNOSIS — F41.1 GENERALIZED ANXIETY DISORDER: ICD-10-CM

## 2019-08-28 PROBLEM — F32.A MILD DEPRESSION: Status: RESOLVED | Noted: 2019-01-31 | Resolved: 2019-08-28

## 2019-08-28 PROCEDURE — 90792 PSYCH DIAG EVAL W/MED SRVCS: CPT | Performed by: PSYCHIATRY & NEUROLOGY

## 2019-08-28 RX ORDER — MIRTAZAPINE 7.5 MG/1
7.5 TABLET, FILM COATED ORAL
Qty: 30 TABLET | Refills: 1 | Status: SHIPPED | OUTPATIENT
Start: 2019-08-28 | End: 2019-10-09 | Stop reason: SDUPTHER
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
## 2019-08-28 NOTE — PATIENT INSTRUCTIONS
Major depressive disorder, recurrent severe without psychotic features ([facility] )  -     mirtazapine (REMERON) 7 5 MG tablet; Take 1 tablet (7 5 mg total) by mouth daily at bedtime    Generalized anxiety disorder  -     mirtazapine (REMERON) 7 5 MG tablet; Take 1 tablet (7 5 mg total) by mouth daily at bedtime    Other chronic pain  - gabapentin 300mg TID  -Nortriptyline 10mg at bedtime  Speak to your pain management physician about coming off of the nortriptyline and starting on Cymbalta  Insomnia, unspecified type  -     mirtazapine (REMERON) 7 5 MG tablet;  Take 1 tablet (7 5 mg total) by mouth daily at bedtime        Follow up in 4-6 weeks

## 2019-09-24 ENCOUNTER — DOCUMENTATION (OUTPATIENT)
Dept: PSYCHIATRY | Facility: CLINIC | Age: 44
End: 2019-09-24

## 2019-09-24 NOTE — PROGRESS NOTES
[facility] Cx appointment 9/25/2019  (rescheduled because he could not make it   His new appointment is for 10/09/19 )

## 2019-09-26 ENCOUNTER — SOCIAL WORK (OUTPATIENT)
Dept: BEHAVIORAL/MENTAL HEALTH CLINIC | Facility: CLINIC | Age: 44
End: 2019-09-26
Payer: COMMERCIAL

## 2019-09-26 DIAGNOSIS — F33.2 MAJOR DEPRESSIVE DISORDER, RECURRENT SEVERE WITHOUT PSYCHOTIC FEATURES (HCC): Primary | ICD-10-CM

## 2019-09-26 PROCEDURE — 90834 PSYTX W PT 45 MINUTES: CPT | Performed by: COUNSELOR

## 2019-09-26 NOTE — PSYCH
Psychotherapy Provided: Individual Psychotherapy 50 minutes     Length of time in session: 50 minutes, follow up in 3 week    Goals addressed in session: Goal 1     Pain:      moderate to severe    4    Current suicide risk : Low     D: Kerri Askew shares that he is doing well  Thinks new meds from Dr Bakari Valverde are helping  Not as sad, not crying as much  Has more good days than bad right now  Not as intense depression  Going out a lot more  Knows how important that is  When he is with the baby he goes to her house and hangs there outside  Helps dad by handing him tools  Went to [employer] yesterday with son  Baby is doing well - 4 months old  Just had bad ear infection  Talked about his stress and when "that happens" he needs to take a step back and sit and think - "I am a jumpy elida/" We talked about taking a time out for self especially when stressed with the baby  Did CBT work  A: Kerri Askew appears to be doing better  Legs shook whole session , lots of psychomotor agitation but less depression and no anger  P: Ramone to take meds as prescribed, work on self care and tools for stress management  Behavioral Health Treatment Plan [facility]: Diagnosis and Treatment Plan explained to [address] relates understanding diagnosis and is agreeable to Treatment Plan   Yes

## 2019-10-09 ENCOUNTER — OFFICE VISIT (OUTPATIENT)
Dept: PSYCHIATRY | Facility: CLINIC | Age: 44
End: 2019-10-09
Payer: COMMERCIAL

## 2019-10-09 DIAGNOSIS — F33.2 MAJOR DEPRESSIVE DISORDER, RECURRENT SEVERE WITHOUT PSYCHOTIC FEATURES (HCC): ICD-10-CM

## 2019-10-09 DIAGNOSIS — F41.1 GENERALIZED ANXIETY DISORDER: ICD-10-CM

## 2019-10-09 DIAGNOSIS — G47.00 INSOMNIA, UNSPECIFIED TYPE: ICD-10-CM

## 2019-10-09 PROCEDURE — 99213 OFFICE O/P EST LOW 20 MIN: CPT | Performed by: PSYCHIATRY & NEUROLOGY

## 2019-10-09 RX ORDER — MIRTAZAPINE 15 MG/1
15 TABLET, FILM COATED ORAL
Qty: 90 TABLET | Refills: 1 | Status: SHIPPED | OUTPATIENT
Start: 2019-10-09 | End: 2020-04-16

## 2019-10-09 NOTE — PSYCH
Subjective:     Patient ID: Bernardo Malloy is a 38 y o  male  HPI ROS Appetite Changes and Sleep: the patient stated that he is doing well  He has been seeing a therapist, he has seen Dottie but now transitioning to Jordy Almeida  He stated that the medication was helping him be less sad and was able to control the crying  This was the first 2-3 weeks but the last week, the sadness has come back and crying a little bit again  He was nervous about taking two pills without talking to the writer first  Mood has been depressed but less intense  He denied SI/HI  He has occasional passive death wish but also less frequent  He endorosed a good appetite, no changes in his weight  He is sleeping alright  Energy level has been good, better than before  Concentration is not great yet  Has a great support system and is grateful for it  Review Of Systems:     Mood Anxiety and Depression   Behavior passive death wish   Thought Content Normal   General Emotional Problems and Decreased Functioning   Personality Normal   Other Psych Symptoms Normal   Constitutional As Noted in HPI   ENT Negative   Cardiovascular chest pain with anxiety   Respiratory Negative   Gastrointestinal Negative   Genitourinary Negative   Musculoskeletal Negative   Integumentary Negative   Neurological Negative   Endocrine Normal    Other Symptoms Normal              Laboratory Results: No results found for this or any previous visit      Substance Abuse History:  Social History     Substance and Sexual Activity   Drug Use Not Currently    Types: Marijuana    Comment: daily       Family Psychiatric History:   Family History   Problem Relation Age of Onset   [facility] Cancer Mother     Depression Sister     Drug abuse Sister     Alcohol abuse Sister        The following portions of the patient's history were reviewed and updated as appropriate: allergies, current medications, past family history, past medical history, past social history, past surgical history and problem list     Social History     Socioeconomic History    Marital status: Single     Spouse name: Not on file    Number of children: 3    Years of education: 15    Highest education level: High school graduate   Occupational History    Occupation: unemployed   Social Needs    Financial resource strain: Somewhat hard   [place] insecurity:     Worry: Sometimes true     Inability: Sometimes true    Transportation needs:     Medical: No     Non-medical: No   Tobacco Use    Smoking status: Never Smoker    Smokeless tobacco: Never Used   Substance and Sexual Activity    Alcohol use: Not Currently     Frequency: Monthly or less     Drinks per session: 1 or 2     Binge frequency: Never     Comment: socially  On Marjorie drank 2 beers  He used to drink a lot in the past, about 25 years ago  he had numerous DUIs  has been to [place] [id number]      Drug use: Not Currently     Types: Marijuana     Comment: daily    Sexual activity: Yes     Partners: Female   Lifestyle    Physical activity:     Days per week: Not on file     Minutes per session: Not on file    Stress: Not on file   Relationships    Social connections:     Talks on phone: More than three times a week     Gets together: Twice a week     Attends Quaker service: Never     Active member of club or organization: No     Attends meetings of clubs or organizations: Never     Relationship status: Living with partner    Intimate partner violence:     Fear of current or ex partner: No     Emotionally abused: No     Physically abused: No     Forced sexual activity: No   Other Topics Concern    Not on file   Social History Narrative    Not on file     Social History     Social History Narrative    Not on file       Objective:       Mental status:  Appearance calm and cooperative , adequate hygiene and grooming and good eye contact    Mood anxious   Affect affect was constricted   Speech a normal rate   Thought Processes coherent/organized and normal thought processes Hallucinations no hallucinations present    Thought Content no delusions   Abnormal Thoughts no suicidal thoughts  and no homicidal thoughts    Orientation  oriented to person and place and time   Remote Memory short term memory intact and long term memory intact   Attention Span concentration impaired   Intellect Appears to be of Average Intelligence   Insight Insight intact   Judgement judgment was intact   Muscle Strength Muscle strength and tone were normal and Normal gait    Language no difficulty naming common objects, no difficulty repeating a phrase  and no difficulty writing a sentence    Fund of Knowledge displays adequate knowledge of current events, adequate fund of knowledge regarding past history and adequate fund of knowledge regarding vocabulary    Pain none   Pain Scale 0       Assessment/Plan:       Diagnoses and all orders for this visit:    Major depressive disorder, recurrent severe without psychotic features (HCC)  -     mirtazapine (REMERON) 15 mg tablet; Take 1 tablet (15 mg total) by mouth daily at bedtime    Generalized anxiety disorder  -     mirtazapine (REMERON) 15 mg tablet; Take 1 tablet (15 mg total) by mouth daily at bedtime    Insomnia, unspecified type  -     mirtazapine (REMERON) 15 mg tablet; Take 1 tablet (15 mg total) by mouth daily at bedtime           follow up in 3 months  Continue with monthly therapy, will ask about more frequency    Treatment Recommendations- Risks Benefits      Immediate Medical/Psychiatric/Psychotherapy Treatments and Any Precautions: showing improvement in Anxiety and depression  Much more calm and less on edge      Risks, Benefits And Possible Side Effects Of Medications:  PSYCH RISK, BENEFITS AND POSSIBLE SIDE EFFECTS discussed    Controlled Medication Discussion: No records found for controlled prescriptions according to [address]

## 2019-10-09 NOTE — PATIENT INSTRUCTIONS
Major depressive disorder, recurrent severe without psychotic features ([facility] )  -     mirtazapine (REMERON) 15 mg tablet; Take 1 tablet (15 mg total) by mouth daily at bedtime    Generalized anxiety disorder  -     mirtazapine (REMERON) 15 mg tablet; Take 1 tablet (15 mg total) by mouth daily at bedtime    Insomnia, unspecified type  -     mirtazapine (REMERON) 15 mg tablet;  Take 1 tablet (15 mg total) by mouth daily at bedtime           follow up in 3 months  Continue with monthly therapy, will ask about more frequency

## 2019-10-14 ENCOUNTER — SOCIAL WORK (OUTPATIENT)
Dept: BEHAVIORAL/MENTAL HEALTH CLINIC | Facility: CLINIC | Age: 44
End: 2019-10-14
Payer: COMMERCIAL

## 2019-10-14 DIAGNOSIS — F41.1 GENERALIZED ANXIETY DISORDER: ICD-10-CM

## 2019-10-14 DIAGNOSIS — F33.2 MAJOR DEPRESSIVE DISORDER, RECURRENT SEVERE WITHOUT PSYCHOTIC FEATURES (HCC): Primary | ICD-10-CM

## 2019-10-14 PROCEDURE — 90834 PSYTX W PT 45 MINUTES: CPT | Performed by: SOCIAL WORKER

## 2019-10-14 NOTE — PSYCH
Psychotherapy Provided: Individual Psychotherapy 45 minutes     Length of time in session: 45 minutes, follow up in 2 weeks    Goals addressed in session: Goal 1     Pain:      moderate to severe    6    Current suicide risk : Low     D: [address] attended first appointment with me (transfer from previous therapist)  He discussed symptoms of depression and family supports  Ramone generally gave history of self, family of origin, work history and injury and haw same impacts his mental health  Explored some negative thinking with Ramone and plans for improving mood  A: [address]  presented as depressed and anxious; was crying throughout session, legs shaking; seemed to self sooth as he discussed family and support system  [address] seems motivated for treatment; seems to benefit from supportive therapy and was smiling and calmer by end of session  P:  [address] will schedule self care time into his day at least 3 days a week  He agreed to continue counseling every two weeks  Behavioral Health Treatment Plan [facility]: Diagnosis and Treatment Plan explained to [address] relates understanding diagnosis and is agreeable to Treatment Plan   Yes

## 2019-11-01 ENCOUNTER — SOCIAL WORK (OUTPATIENT)
Dept: BEHAVIORAL/MENTAL HEALTH CLINIC | Facility: CLINIC | Age: 44
End: 2019-11-01
Payer: COMMERCIAL

## 2019-11-01 DIAGNOSIS — F41.1 GENERALIZED ANXIETY DISORDER: ICD-10-CM

## 2019-11-01 DIAGNOSIS — F33.2 MAJOR DEPRESSIVE DISORDER, RECURRENT SEVERE WITHOUT PSYCHOTIC FEATURES (HCC): Primary | ICD-10-CM

## 2019-11-01 PROCEDURE — 90834 PSYTX W PT 45 MINUTES: CPT | Performed by: SOCIAL WORKER

## 2019-11-01 NOTE — PSYCH
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  He also feels like a failure due to having been to [facility]  Explored with Ava Shore various strengths, positive affirmations and improving self esteem  Explored with Ramone increase time spent on self care, including asking for help when he needs it  Ramone reported taking medication as prescribed and denied SI/HI ideations  A: Ava Shore presented with depressed mood, very anxious and irritable; was fidgeting, picking and rocking throughout session  He was able to bring self to calmer state by end of session  He seems conflicted about asking others for help, as he believes this reinforces that he is a failure  P: Continue CBT with Ramone; follow up in two weeks  Behavioral Health Treatment Plan [facility]: Diagnosis and Treatment Plan explained to [address] relates understanding diagnosis and is agreeable to Treatment Plan   Yes

## 2019-11-15 ENCOUNTER — SOCIAL WORK (OUTPATIENT)
Dept: BEHAVIORAL/MENTAL HEALTH CLINIC | Facility: CLINIC | Age: 44
End: 2019-11-15
Payer: COMMERCIAL

## 2019-11-15 DIAGNOSIS — F41.1 GENERALIZED ANXIETY DISORDER: ICD-10-CM

## 2019-11-15 DIAGNOSIS — F33.2 MAJOR DEPRESSIVE DISORDER, RECURRENT SEVERE WITHOUT PSYCHOTIC FEATURES (HCC): Primary | ICD-10-CM

## 2019-11-15 PROCEDURE — 90834 PSYTX W PT 45 MINUTES: CPT | Performed by: SOCIAL WORKER

## 2019-11-15 NOTE — PSYCH
Psychotherapy Provided: Individual Psychotherapy 45 minutes     Length of time in session: 45 minutes, follow up in 2 weeks    Goals addressed in session: Goal 1     Pain:      none    0    Current suicide risk : Low     D: Ramone reported some improvement in mood; he continues to be depressed  Ramone explored ways to improve mood, including redefining the time he spends with his sons  He continues to reach out to friends and is trying to decrease isolation  Used CBT with Danny Mai to work on decreasing irrational and negative thoughts  Ramone verbalized this being difficult for him as he is stubborn  A: Danny Mai presented with improved mood, brighter affect  He was tearful a few times, but did not cry and was able to talk entire session  Danny Mai continues to appear motivated for treatment  P:  Continue CBT; next appointment 2 weeks  Behavioral Health Treatment Plan [facility]: Diagnosis and Treatment Plan explained to [address] relates understanding diagnosis and is agreeable to Treatment Plan   Yes

## 2019-11-29 ENCOUNTER — SOCIAL WORK (OUTPATIENT)
Dept: BEHAVIORAL/MENTAL HEALTH CLINIC | Facility: CLINIC | Age: 44
End: 2019-11-29
Payer: COMMERCIAL

## 2019-11-29 DIAGNOSIS — F41.1 GENERALIZED ANXIETY DISORDER: Primary | ICD-10-CM

## 2019-11-29 DIAGNOSIS — F33.2 MAJOR DEPRESSIVE DISORDER, RECURRENT SEVERE WITHOUT PSYCHOTIC FEATURES (HCC): ICD-10-CM

## 2019-11-29 PROCEDURE — 90834 PSYTX W PT 45 MINUTES: CPT | Performed by: SOCIAL WORKER

## 2019-11-29 NOTE — PSYCH
Psychotherapy Provided: Individual Psychotherapy 45 minutes     Length of time in session: 45 minutes, follow up in 2 weeks    Goals addressed in session: Goal 1     Pain:      none    0    Current suicide risk : Low     D: Ramone reported improvement in mood; he attributes same to medication  He discussed ongoing chronic worrying and feelings of depression  Used CBT with Ruben Schwartz to explore ways to better regulate and cope with emotions  Ruben Schwartz is able to verbalize understanding coping skills, however, struggles to utilize same  He is trying to improve social activity and get out at times  Yesterday he spent some time with family at [facility], but shortly after dinner he left due to feeling overwhelmed  Explored with Ruben Schwartz ways to focus on what he does have and redefining how he can approach life without being physically active and employed  A: Ruben Schwartz presented with improved mood; he was able to discuss feelings of depression and anxiety without becoming tearful  Ruben Schwartz seems to struggle accepting his life since he's been out of work for past 11 years; he did appear willing to re-evaluate his strengths  P: Continue individual therapy; follow up 2 weeks  Behavioral Health Treatment Plan [facility]: Diagnosis and Treatment Plan explained to [address] relates understanding diagnosis and is agreeable to Treatment Plan   Yes

## 2019-12-13 ENCOUNTER — SOCIAL WORK (OUTPATIENT)
Dept: BEHAVIORAL/MENTAL HEALTH CLINIC | Facility: CLINIC | Age: 44
End: 2019-12-13
Payer: COMMERCIAL

## 2019-12-13 DIAGNOSIS — F33.2 MAJOR DEPRESSIVE DISORDER, RECURRENT SEVERE WITHOUT PSYCHOTIC FEATURES (HCC): Primary | ICD-10-CM

## 2019-12-13 DIAGNOSIS — F41.1 GENERALIZED ANXIETY DISORDER: ICD-10-CM

## 2019-12-13 PROCEDURE — 90834 PSYTX W PT 45 MINUTES: CPT | Performed by: SOCIAL WORKER
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
## 2019-12-13 NOTE — PSYCH
Psychotherapy Provided: Individual Psychotherapy 45 minutes     Length of time in session: 45 minutes, follow up in 4 weeks    Goals addressed in session: Goal 1     Pain:      none    0    Current suicide risk : Low     D: Ramone and I reviewed and updated his treatment plan  Ramone discussed ongoing efforts to be social and decrease isolating  Ramone discussed his past, including [facility] time; he stated that he did not want to talk about the details, but gave a general history, including how birth of daughter changed how he lived his life  Ramone reports feeling improvement in mood, but ongoing worry  He reports crying about three times a week  He reports taking medication as prescribed  A: Hernandez Head presented with improvement in mood; he was able to discuss thoughts and feelings without crying  He continues to show motivation for treatment  P: Continue CBT; follow up 4 weeks  Behavioral Health Treatment Plan [facility] beds at the [facility]: Diagnosis and Treatment Plan explained to [address] relates understanding diagnosis and is agreeable to Treatment Plan   Yes

## 2020-01-10 ENCOUNTER — SOCIAL WORK (OUTPATIENT)
Dept: BEHAVIORAL/MENTAL HEALTH CLINIC | Facility: CLINIC | Age: 45
End: 2020-01-10
Payer: COMMERCIAL

## 2020-01-10 DIAGNOSIS — F33.2 MAJOR DEPRESSIVE DISORDER, RECURRENT SEVERE WITHOUT PSYCHOTIC FEATURES (HCC): ICD-10-CM

## 2020-01-10 DIAGNOSIS — F41.1 GENERALIZED ANXIETY DISORDER: Primary | ICD-10-CM

## 2020-01-10 PROCEDURE — 90834 PSYTX W PT 45 MINUTES: CPT | Performed by: SOCIAL WORKER

## 2020-01-10 NOTE — PSYCH
Psychotherapy Provided: Individual Psychotherapy 45 minutes     Length of time in session: 45 minutes, follow up in 2 weeks    Goals addressed in session: Goal 1     Pain:      none    0    Current suicide risk : Low     D: Ramone reported he enjoyed recent holidays  He expressed some anxiety as 7 month old son has been sick, including needing 2 hospital visits due to high fever; baby is reportedly much better now  Ramone talked about feeling depressed and anxious and believing that he is the only person who feels this way; he reports feeling jealous that everyone else is happy  Used CBT to challenge Ramone's irrational thoughts and reframe same  Continue working with Aletha Hill on improving coping skills, including setting goals for self, particularly when he is going to be alone (e g , daughter will be taking both sons tomorrow and partner will be working; leaving Aletha Hill alone for several hours)  Aletha Hill was able to identify some goals, including exercise, spending time with parents then spending time with a friend  A: Aletha Hill continues to present as depressed and anxious, but does have some improvement when he's actively involved with his family  Aletha Hill was fidgety, slightly tearful, but did not cry; he appears to able to discuss feelings in healthy way  P: Continue individual therapy; follow up 2 weeks  Behavioral Health Treatment Plan [facility]: Diagnosis and Treatment Plan explained to [address] relates understanding diagnosis and is agreeable to Treatment Plan   Yes Skin normal color for race, warm, dry and intact. No evidence of rash.

## 2020-01-24 ENCOUNTER — SOCIAL WORK (OUTPATIENT)
Dept: BEHAVIORAL/MENTAL HEALTH CLINIC | Facility: CLINIC | Age: 45
End: 2020-01-24
Payer: COMMERCIAL

## 2020-01-24 DIAGNOSIS — F41.1 GENERALIZED ANXIETY DISORDER: Primary | ICD-10-CM

## 2020-01-24 DIAGNOSIS — F33.2 MAJOR DEPRESSIVE DISORDER, RECURRENT SEVERE WITHOUT PSYCHOTIC FEATURES (HCC): ICD-10-CM

## 2020-01-24 PROCEDURE — 90834 PSYTX W PT 45 MINUTES: CPT | Performed by: SOCIAL WORKER

## 2020-01-24 NOTE — PSYCH
Psychotherapy Provided: Individual Psychotherapy 45 minutes     Length of time in session: 45 minutes, follow up in 2 weeks    Goals addressed in session: Goal 1     Pain:      none    0    Current suicide risk : Low     D: Ramone spend most of session sharing about family, activities they've done and do together, and how he feels about them  He talked about pets and his love for them  Ruben Schwartz continues to report feelings of anxiety, chronic worry and depression  He reports taking medication as prescribed; he stated that medication helps a little  Ramone reports he continues to try to find time to spend with friends to help decrease social isolation  Used motivational interviewing and CBT to encourage Ruben Schwartz to continue in efforts to make plans as being idle contributes to symptoms  A: Ruben Schwartz presented with slight improvement in mood during session; affect was rather bright as he talked about family time  Ruben Schwartz continues to struggle with symptoms of both anxiety and depression, continues to use coping skills as he's able and continues to seem motivated for ongoing treatment  P: Continue individual therapy; follow up 1 week  Behavioral Health Treatment Plan [facility]: Diagnosis and Treatment Plan explained to [address] relates understanding diagnosis and is agreeable to Treatment Plan   Yes

## 2020-02-07 ENCOUNTER — SOCIAL WORK (OUTPATIENT)
Dept: BEHAVIORAL/MENTAL HEALTH CLINIC | Facility: CLINIC | Age: 45
End: 2020-02-07
Payer: COMMERCIAL

## 2020-02-07 DIAGNOSIS — F41.1 GENERALIZED ANXIETY DISORDER: Primary | ICD-10-CM

## 2020-02-07 DIAGNOSIS — F33.2 MAJOR DEPRESSIVE DISORDER, RECURRENT SEVERE WITHOUT PSYCHOTIC FEATURES (HCC): ICD-10-CM

## 2020-02-07 PROCEDURE — 90834 PSYTX W PT 45 MINUTES: CPT | Performed by: SOCIAL WORKER

## 2020-02-07 NOTE — PSYCH
Psychotherapy Provided: Individual Psychotherapy 45 minutes     Length of time in session: 45 minutes, follow up in 2 weeks    Goals addressed in session: Goal 1     Pain:      none    0    Current suicide risk : Low     D: Ramone reported increase in feelings of depression  He talked about recent death of famous athlete and wondered "why him," "why not me?"  Explored negative thinking with [facility]  He denied SI  He talked about feeling as though he's a burden to his family  Explored with [facility] what he means to his family and his purpose in life  Ramone talked about son's upcoming birthday, daughter's goals regarding education and his baby who's about to turn 1  Ramone reports having lots of different emotions, including being happy, sad and angry  He reports he continues to spend time with his mom and some friends at [place] [id number] for support  A: [facility] presented as depressed, seemed able to acknowledge some positives in his life  He denies SI and reports ongoing compliance with medication  Ramone encouraged to focus on positive things in his life  P: Continue individual therapy; follow up 2 weeks  Behavioral Health Treatment Plan [facility]: Diagnosis and Treatment Plan explained to [address] relates understanding diagnosis and is agreeable to Treatment Plan   Yes

## 2020-02-21 ENCOUNTER — SOCIAL WORK (OUTPATIENT)
Dept: BEHAVIORAL/MENTAL HEALTH CLINIC | Facility: CLINIC | Age: 45
End: 2020-02-21
Payer: COMMERCIAL

## 2020-02-21 DIAGNOSIS — F41.1 GENERALIZED ANXIETY DISORDER: Primary | ICD-10-CM

## 2020-02-21 DIAGNOSIS — F33.2 MAJOR DEPRESSIVE DISORDER, RECURRENT SEVERE WITHOUT PSYCHOTIC FEATURES (HCC): ICD-10-CM

## 2020-02-21 PROCEDURE — 90834 PSYTX W PT 45 MINUTES: CPT | Performed by: SOCIAL WORKER
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
## 2020-03-06 ENCOUNTER — SOCIAL WORK (OUTPATIENT)
Dept: BEHAVIORAL/MENTAL HEALTH CLINIC | Facility: CLINIC | Age: 45
End: 2020-03-06
Payer: COMMERCIAL

## 2020-03-06 DIAGNOSIS — F41.1 GENERALIZED ANXIETY DISORDER: Primary | ICD-10-CM

## 2020-03-06 DIAGNOSIS — F33.2 MAJOR DEPRESSIVE DISORDER, RECURRENT SEVERE WITHOUT PSYCHOTIC FEATURES (HCC): ICD-10-CM

## 2020-03-06 PROCEDURE — 90834 PSYTX W PT 45 MINUTES: CPT | Performed by: SOCIAL WORKER

## 2020-03-06 NOTE — PSYCH
Psychotherapy Provided: Individual Psychotherapy 45 minutes     Length of time in session: 45 minutes, follow up in 2 weeks  Goals addressed in session: Goal 1     Pain:      none    0    Current suicide risk : Low     D: Ramone reported slight improvement in mood  He attributes same to son's baseball season starting as well as spending some time outdoors  Ramone expressed disappointment about psychiatric appointment being rescheduled  Ramone reports he continues to take medication as prescribed  He discussed family relationships and support, particularly focusing on brother-in-laws  Ramone talked about upcoming plans to spend more time outdoors with son  A: [facility] presented with improved mood, seemed calmer, brighter affect, was smiling, no tearfulness  [facility] seems future oriented  P: Continue individual therapy; follow up 2 weeks  Behavioral Health Treatment Plan [facility]: Diagnosis and Treatment Plan explained to [address] relates understanding diagnosis and is agreeable to Treatment Plan   Yes

## 2020-03-20 ENCOUNTER — SOCIAL WORK (OUTPATIENT)
Dept: BEHAVIORAL/MENTAL HEALTH CLINIC | Facility: CLINIC | Age: 45
End: 2020-03-20
Payer: COMMERCIAL

## 2020-03-20 DIAGNOSIS — F41.1 GENERALIZED ANXIETY DISORDER: ICD-10-CM

## 2020-03-20 DIAGNOSIS — F33.2 MAJOR DEPRESSIVE DISORDER, RECURRENT SEVERE WITHOUT PSYCHOTIC FEATURES (HCC): Primary | ICD-10-CM

## 2020-03-20 PROCEDURE — 90834 PSYTX W PT 45 MINUTES: CPT | Performed by: SOCIAL WORKER

## 2020-03-20 NOTE — PSYCH
Psychotherapy Provided: Individual Psychotherapy 45 minutes     Length of time in session: 45 minutes, follow up in 2 weeks    Goals addressed in session: Goal 1     Pain:      none    0    Current suicide risk : Low     D: Ramone reported increase in anxiety due to corona virus  He reports this current situation has him worried about his almost 3year old son and his elderly mother (who has cancer)  Used CBT to explore with Simin Al ways to cope with same  Validated and normalized his feelings  Ramone discussed thoughts and feelings related to his wife's job closing; he talked about financial stress and what this will mean for his family  Offered support  Ramone verbalized feeling some comfort in knowing that he's not alone, and that these times are hard for everyone  A: Simin Al presented with increased in anxiety; his legs were shaking entire session  He was able to verbalize some comfort since this virus impacts everyone  He seemed able to identify ways to cope  P: Continue individual therapy; follow up 2 weeks  Behavioral Health Treatment Plan [facility]: Diagnosis and Treatment Plan explained to [address] relates understanding diagnosis and is agreeable to Treatment Plan   Yes

## 2020-04-15 DIAGNOSIS — G47.00 INSOMNIA, UNSPECIFIED TYPE: ICD-10-CM

## 2020-04-15 DIAGNOSIS — F33.2 MAJOR DEPRESSIVE DISORDER, RECURRENT SEVERE WITHOUT PSYCHOTIC FEATURES (HCC): ICD-10-CM

## 2020-04-15 DIAGNOSIS — F41.1 GENERALIZED ANXIETY DISORDER: ICD-10-CM

## 2020-04-16 RX ORDER — MIRTAZAPINE 15 MG/1
TABLET, FILM COATED ORAL
Qty: 90 TABLET | Refills: 1 | Status: SHIPPED | OUTPATIENT
Start: 2020-04-16 | End: 2020-06-03

## 2020-04-17 ENCOUNTER — TELEMEDICINE (OUTPATIENT)
Dept: BEHAVIORAL/MENTAL HEALTH CLINIC | Facility: CLINIC | Age: 45
End: 2020-04-17
Payer: COMMERCIAL

## 2020-04-17 DIAGNOSIS — F41.1 GENERALIZED ANXIETY DISORDER: ICD-10-CM

## 2020-04-17 DIAGNOSIS — F33.2 MAJOR DEPRESSIVE DISORDER, RECURRENT SEVERE WITHOUT PSYCHOTIC FEATURES (HCC): Primary | ICD-10-CM

## 2020-04-17 PROCEDURE — 90832 PSYTX W PT 30 MINUTES: CPT | Performed by: SOCIAL WORKER

## 2020-05-01 ENCOUNTER — TELEMEDICINE (OUTPATIENT)
Dept: BEHAVIORAL/MENTAL HEALTH CLINIC | Facility: CLINIC | Age: 45
End: 2020-05-01
Payer: COMMERCIAL

## 2020-05-01 DIAGNOSIS — F33.2 MAJOR DEPRESSIVE DISORDER, RECURRENT SEVERE WITHOUT PSYCHOTIC FEATURES (HCC): Primary | ICD-10-CM

## 2020-05-01 DIAGNOSIS — F41.1 GENERALIZED ANXIETY DISORDER: ICD-10-CM

## 2020-05-01 PROCEDURE — 90832 PSYTX W PT 30 MINUTES: CPT | Performed by: SOCIAL WORKER

## 2020-05-15 ENCOUNTER — TELEMEDICINE (OUTPATIENT)
Dept: BEHAVIORAL/MENTAL HEALTH CLINIC | Facility: CLINIC | Age: 45
End: 2020-05-15
Payer: COMMERCIAL

## 2020-05-15 DIAGNOSIS — F33.2 MAJOR DEPRESSIVE DISORDER, RECURRENT SEVERE WITHOUT PSYCHOTIC FEATURES (HCC): ICD-10-CM

## 2020-05-15 DIAGNOSIS — F41.1 GENERALIZED ANXIETY DISORDER: Primary | ICD-10-CM

## 2020-05-15 PROCEDURE — 90832 PSYTX W PT 30 MINUTES: CPT | Performed by: SOCIAL WORKER

## 2020-06-03 ENCOUNTER — TELEMEDICINE (OUTPATIENT)
Dept: PSYCHIATRY | Facility: CLINIC | Age: 45
End: 2020-06-03
Payer: COMMERCIAL

## 2020-06-03 DIAGNOSIS — F33.2 MAJOR DEPRESSIVE DISORDER, RECURRENT SEVERE WITHOUT PSYCHOTIC FEATURES (HCC): Primary | ICD-10-CM

## 2020-06-03 DIAGNOSIS — F41.1 GENERALIZED ANXIETY DISORDER: ICD-10-CM

## 2020-06-03 DIAGNOSIS — G47.00 INSOMNIA, UNSPECIFIED TYPE: ICD-10-CM

## 2020-06-03 PROCEDURE — 99213 OFFICE O/P EST LOW 20 MIN: CPT | Performed by: PSYCHIATRY & NEUROLOGY

## 2020-06-03 RX ORDER — QUETIAPINE FUMARATE 25 MG/1
25 TABLET, FILM COATED ORAL
Qty: 30 TABLET | Refills: 1 | Status: SHIPPED | OUTPATIENT
Start: 2020-06-03 | End: 2020-08-12

## 2020-06-03 RX ORDER — CITALOPRAM 10 MG/1
10 TABLET ORAL DAILY
Qty: 60 TABLET | Refills: 1 | Status: SHIPPED | OUTPATIENT
Start: 2020-06-03 | End: 2020-07-14

## 2020-06-05 ENCOUNTER — TELEPHONE (OUTPATIENT)
Dept: PSYCHIATRY | Facility: CLINIC | Age: 45
End: 2020-06-05

## 2020-06-05 ENCOUNTER — TELEMEDICINE (OUTPATIENT)
Dept: BEHAVIORAL/MENTAL HEALTH CLINIC | Facility: CLINIC | Age: 45
End: 2020-06-05
Payer: COMMERCIAL

## 2020-06-05 DIAGNOSIS — F33.2 MAJOR DEPRESSIVE DISORDER, RECURRENT SEVERE WITHOUT PSYCHOTIC FEATURES (HCC): ICD-10-CM

## 2020-06-05 DIAGNOSIS — F41.1 GENERALIZED ANXIETY DISORDER: Primary | ICD-10-CM

## 2020-06-05 PROCEDURE — 90832 PSYTX W PT 30 MINUTES: CPT | Performed by: SOCIAL WORKER

## 2020-07-10 ENCOUNTER — TELEMEDICINE (OUTPATIENT)
Dept: BEHAVIORAL/MENTAL HEALTH CLINIC | Facility: CLINIC | Age: 45
End: 2020-07-10
Payer: COMMERCIAL

## 2020-07-10 DIAGNOSIS — F41.1 GENERALIZED ANXIETY DISORDER: Primary | ICD-10-CM

## 2020-07-10 DIAGNOSIS — F33.2 MAJOR DEPRESSIVE DISORDER, RECURRENT SEVERE WITHOUT PSYCHOTIC FEATURES (HCC): ICD-10-CM

## 2020-07-10 PROCEDURE — 90832 PSYTX W PT 30 MINUTES: CPT | Performed by: SOCIAL WORKER

## 2020-07-10 NOTE — PSYCH
Virtual Brief Visit    Assessment/Plan:    Problem List Items Addressed This Visit        Other    Major depressive disorder, recurrent severe without psychotic features ([facility] )    Generalized anxiety disorder - Primary                Reason for visit is No chief complaint on file  Encounter provider Gonzalo Guzman    Provider located at [address] [facility]  [address]  [facility] [address]    Recent Visits  No visits were found meeting these conditions  Showing recent visits within past 7 days and meeting all other requirements     Future Appointments  No visits were found meeting these conditions  Showing future appointments within next 150 days and meeting all other requirements        After connecting through telephone, the patient was identified by name and date of birth  Briana Tarango was informed that this is a telemedicine visit and that the visit is being conducted through telephone  My office door was closed  No one else was in the room  He acknowledged consent and understanding of privacy and security of the platform  The patient has agreed to participate and understands he can discontinue the visit at any time  Patient is aware this is a billable service  Subjective    Briana Tarango is a 41 y o  male   D: [place] Benavides reported concern that he hasn't gotten one of his prescribed medications yet due to pharmacy needing to communicate with provider; sent message to Dr Mary Grace Bello re: same  France Benavides continues to report symptoms of depression and anxiety, including loneliness, sleep disturbance, anhedonia and chronic worry  Explored with [place] Benavides ways to cope with same, including taking medication as prescribed and self care  France Benavides continues to spend time only with his family during COVID-23  He discussed concerns related to the possibility of schools reopening in the fall and how same would impact his son    Kylee Pack's feelings and offered support  A: Anna Ricci presented as depressed and anxious  He seems to be working on self, but could have showed more initiative in following up on medication issue  P: Continue individual therapy; follow up 2 weeks  HPI     Past Medical History:   Diagnosis Date    Arthritis     Chronic pain disorder     low back and neck    Intestinal polyp     Mild depression ([address] ) 1/31/2019    Rectal bleeding     Right lumbar radiculopathy        Past Surgical History:   Procedure Laterality Date    KNEE ARTHROSCOPY Left     knee    CT COLONOSCOPY FLX DX W/COLLJ SPEC WHEN PFRMD N/A 5/2/2017    Procedure: COLONOSCOPY with polypectomies;  Surgeon: Cuong Granados MD;  Location: [facility]; Service: General       Current Outpatient Medications   Medication Sig Dispense Refill    citalopram (CeleXA) 10 mg tablet Take 1 tablet (10 mg total) by mouth daily For 2 weeks, then increase to 20mg  60 tablet 1    QUEtiapine (SEROquel) 25 mg tablet Take 1 tablet (25 mg total) by mouth daily at bedtime 30 tablet 1     No current facility-administered medications for this visit  Allergies   Allergen Reactions    Pregabalin      Depression       Review of Systems    There were no vitals filed for this visit  I spent 30 minutes directly with the patient during this visit    [address] acknowledges that he has consented to an online visit or consultation  He understands that the online visit is based solely on information provided by him, and that, in the absence of a face-to-face physical evaluation by the physician, the diagnosis he receives is both limited and provisional in terms of accuracy and completeness  This is not intended to replace a full medical face-to-face evaluation by the physician  [facility] understands and accepts these terms

## 2020-07-13 ENCOUNTER — TELEPHONE (OUTPATIENT)
Dept: PSYCHIATRY | Facility: CLINIC | Age: 45
End: 2020-07-13

## 2020-07-13 NOTE — TELEPHONE ENCOUNTER
----- Message from Phan Meza MD sent at 7/13/2020  9:42 AM EDT -----  Can you please call the pharmacy  This patient never got his celexa and they did not reach out to us about an issue  Thanks  ----- Message -----  From: Gonzalo Guzman  Sent: 7/10/2020  10:07 AM EDT  To: Phan Meza MD    Hi  Above patient saw you on 6/3  He reports that at that time, the pharmacy only gave him the seroquel, NOT the celexa  Pharmacy apparently told him they need to discuss celexa with you before approving it  I'm on the phone with him now, and he said he still doesn't have it  Can someone follow up with his pharmacy? Or how should he proceed? Thanks for your guidance on this

## 2020-07-13 NOTE — TELEPHONE ENCOUNTER
I spoke with the pharmacist  Insurance will not cover citalopram 10 mg, #60  Per the pharmacist, insurance would cover 20 mg dosing - 1/2 tab daily for two weeks then 1 tab daily  Please review and send if appropriate  Will follow up with [facility] after review   Thank you

## 2020-07-14 DIAGNOSIS — F41.1 GENERALIZED ANXIETY DISORDER: ICD-10-CM

## 2020-07-14 DIAGNOSIS — F33.2 MAJOR DEPRESSIVE DISORDER, RECURRENT SEVERE WITHOUT PSYCHOTIC FEATURES (HCC): Primary | ICD-10-CM

## 2020-07-14 RX ORDER — CITALOPRAM 20 MG/1
10 TABLET ORAL DAILY
Qty: 30 TABLET | Refills: 1 | Status: SHIPPED | OUTPATIENT
Start: 2020-07-14 | End: 2020-09-02 | Stop reason: SDUPTHER

## 2020-07-15 NOTE — TELEPHONE ENCOUNTER
Spoke with татьяна and made aware of change in mg and directions  He said the pharmacy explained all the directions to him already   Verbalized understanding

## 2020-08-11 DIAGNOSIS — G47.00 INSOMNIA, UNSPECIFIED TYPE: ICD-10-CM

## 2020-08-12 RX ORDER — QUETIAPINE FUMARATE 25 MG/1
TABLET, FILM COATED ORAL
Qty: 30 TABLET | Refills: 1 | Status: SHIPPED | OUTPATIENT
Start: 2020-08-12 | End: 2020-09-02 | Stop reason: SDUPTHER

## 2020-08-31 ENCOUNTER — SOCIAL WORK (OUTPATIENT)
Dept: BEHAVIORAL/MENTAL HEALTH CLINIC | Facility: CLINIC | Age: 45
End: 2020-08-31
Payer: COMMERCIAL

## 2020-08-31 DIAGNOSIS — F41.1 GENERALIZED ANXIETY DISORDER: Primary | ICD-10-CM

## 2020-08-31 DIAGNOSIS — F33.2 MAJOR DEPRESSIVE DISORDER, RECURRENT SEVERE WITHOUT PSYCHOTIC FEATURES (HCC): ICD-10-CM

## 2020-08-31 PROCEDURE — 90834 PSYTX W PT 45 MINUTES: CPT | Performed by: SOCIAL WORKER

## 2020-08-31 NOTE — PSYCH
Psychotherapy Provided: Individual Psychotherapy 45 minutes     Length of time in session: 45 minutes, follow up in 2 weeks    Goals addressed in session: Goal 1     Pain:      none    0    Current suicide risk : Low       D: Ramone reports new medication is helping somewhat; he reports slight decrease in agitation and anxiety  [employer] continues to be stressed and worried due to COVID-19 and how same will impact son's school year  Validated Ramone's feelings; offered support; explored coping skills with Guadlupe Scripture  Fawn Spann presented as less anxious; shook legs throughout session, but with less intensity; sounded more thoughtful than emotionally reactive during session  Guadlupe Scripture seems to be better coping with depression and anxiety, which is particularly a display of progress due to added stress of COVID-19  P: Continue individual therapy; follow up 2 weeks  Behavioral Health Treatment Plan [facility]: Diagnosis and Treatment Plan explained to [address] relates understanding diagnosis and is agreeable to Treatment Plan   Yes

## 2020-09-02 ENCOUNTER — OFFICE VISIT (OUTPATIENT)
Dept: PSYCHIATRY | Facility: CLINIC | Age: 45
End: 2020-09-02
Payer: COMMERCIAL

## 2020-09-02 DIAGNOSIS — G47.00 INSOMNIA, UNSPECIFIED TYPE: ICD-10-CM

## 2020-09-02 DIAGNOSIS — F33.2 MAJOR DEPRESSIVE DISORDER, RECURRENT SEVERE WITHOUT PSYCHOTIC FEATURES (HCC): ICD-10-CM

## 2020-09-02 DIAGNOSIS — F41.1 GENERALIZED ANXIETY DISORDER: ICD-10-CM

## 2020-09-02 PROCEDURE — 99213 OFFICE O/P EST LOW 20 MIN: CPT | Performed by: PSYCHIATRY & NEUROLOGY

## 2020-09-02 RX ORDER — QUETIAPINE FUMARATE 50 MG/1
50 TABLET, FILM COATED ORAL
Qty: 30 TABLET | Refills: 2 | Status: SHIPPED | OUTPATIENT
Start: 2020-09-02 | End: 2020-09-11 | Stop reason: SDUPTHER

## 2020-09-02 RX ORDER — CITALOPRAM 20 MG/1
30 TABLET ORAL DAILY
Qty: 135 TABLET | Refills: 1 | Status: SHIPPED | OUTPATIENT
Start: 2020-09-02 | End: 2020-09-02 | Stop reason: SDUPTHER

## 2020-09-02 RX ORDER — CITALOPRAM 20 MG/1
30 TABLET ORAL DAILY
Qty: 135 TABLET | Refills: 1 | Status: SHIPPED | OUTPATIENT
Start: 2020-09-02 | End: 2020-12-11 | Stop reason: SDUPTHER
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
## 2020-09-02 NOTE — PATIENT INSTRUCTIONS
Insomnia, unspecified type  -     QUEtiapine (SEROquel) 50 mg tablet; Take 1 tablet (50 mg total) by mouth daily at bedtime    Major depressive disorder, recurrent severe without psychotic features (HCC)  -     citalopram (CeleXA) 20 mg tablet; Take 1 5 tablets (30 mg total) by mouth daily For 2 weeks, then increase to 20mg  Generalized anxiety disorder  -     citalopram (CeleXA) 20 mg tablet; Take 1 5 tablets (30 mg total) by mouth daily For 2 weeks, then increase to 20mg             follow up in 3 months  Continue with monthly therapy

## 2020-09-11 ENCOUNTER — SOCIAL WORK (OUTPATIENT)
Dept: BEHAVIORAL/MENTAL HEALTH CLINIC | Facility: CLINIC | Age: 45
End: 2020-09-11
Payer: COMMERCIAL

## 2020-09-11 DIAGNOSIS — F33.2 MAJOR DEPRESSIVE DISORDER, RECURRENT SEVERE WITHOUT PSYCHOTIC FEATURES (HCC): Primary | ICD-10-CM

## 2020-09-11 DIAGNOSIS — G47.00 INSOMNIA, UNSPECIFIED TYPE: ICD-10-CM

## 2020-09-11 DIAGNOSIS — F41.1 GENERALIZED ANXIETY DISORDER: ICD-10-CM

## 2020-09-11 PROCEDURE — 90834 PSYTX W PT 45 MINUTES: CPT | Performed by: SOCIAL WORKER

## 2020-09-11 RX ORDER — QUETIAPINE FUMARATE 50 MG/1
50 TABLET, FILM COATED ORAL
Qty: 30 TABLET | Refills: 2 | Status: SHIPPED | OUTPATIENT
Start: 2020-09-11 | End: 2021-06-25 | Stop reason: SDUPTHER

## 2020-09-11 NOTE — PSYCH
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 and various behaviors he used to engage in  Ramone talked about how he's changed and wants to do things differently  Ramone discussed this in terms of the life he wants for his children  Explored changes Kaylyn Brody has made, explored positive coping skills with Ramone  Provided positive feedback to Kaylyn Brody for changes he's made  A: Kaylyn Brody presented with improved mood, legs were less shaky than usual; voice was calmer and lower  He seemed happy that medication seems to be helping  Kaylyn Brody continues to demonstrate insight and motivation for ongoing mental wellness  Kaylyn Brody is allowing self to be vulnerable in session  P: Continue individual therapy; follow up 2 weeks  Behavioral Health Treatment Plan [facility]: Diagnosis and Treatment Plan explained to [address] relates understanding diagnosis and is agreeable to Treatment Plan   Yes

## 2020-10-13 ENCOUNTER — SOCIAL WORK (OUTPATIENT)
Dept: BEHAVIORAL/MENTAL HEALTH CLINIC | Facility: CLINIC | Age: 45
End: 2020-10-13
Payer: COMMERCIAL

## 2020-10-13 DIAGNOSIS — F33.2 MAJOR DEPRESSIVE DISORDER, RECURRENT SEVERE WITHOUT PSYCHOTIC FEATURES (HCC): ICD-10-CM

## 2020-10-13 DIAGNOSIS — F41.1 GENERALIZED ANXIETY DISORDER: Primary | ICD-10-CM

## 2020-10-13 PROCEDURE — 90834 PSYTX W PT 45 MINUTES: CPT | Performed by: SOCIAL WORKER

## 2020-10-27 ENCOUNTER — SOCIAL WORK (OUTPATIENT)
Dept: BEHAVIORAL/MENTAL HEALTH CLINIC | Facility: CLINIC | Age: 45
End: 2020-10-27
Payer: COMMERCIAL

## 2020-10-27 DIAGNOSIS — F33.2 MAJOR DEPRESSIVE DISORDER, RECURRENT SEVERE WITHOUT PSYCHOTIC FEATURES (HCC): Primary | ICD-10-CM

## 2020-10-27 DIAGNOSIS — F41.1 GENERALIZED ANXIETY DISORDER: ICD-10-CM

## 2020-10-27 PROCEDURE — 90834 PSYTX W PT 45 MINUTES: CPT | Performed by: SOCIAL WORKER

## 2020-11-10 ENCOUNTER — TELEPHONE (OUTPATIENT)
Dept: FAMILY MEDICINE CLINIC | Facility: CLINIC | Age: 45
End: 2020-11-10

## 2020-11-13 ENCOUNTER — TELEPHONE (OUTPATIENT)
Dept: PSYCHIATRY | Facility: CLINIC | Age: 45
End: 2020-11-13

## 2020-12-11 ENCOUNTER — TELEPHONE (OUTPATIENT)
Dept: PSYCHIATRY | Facility: CLINIC | Age: 45
End: 2020-12-11

## 2020-12-11 ENCOUNTER — TELEMEDICINE (OUTPATIENT)
Dept: PSYCHIATRY | Facility: CLINIC | Age: 45
End: 2020-12-11
Payer: COMMERCIAL

## 2020-12-11 DIAGNOSIS — F33.2 MAJOR DEPRESSIVE DISORDER, RECURRENT SEVERE WITHOUT PSYCHOTIC FEATURES (HCC): ICD-10-CM

## 2020-12-11 DIAGNOSIS — F41.1 GENERALIZED ANXIETY DISORDER: Primary | ICD-10-CM

## 2020-12-11 PROCEDURE — 99213 OFFICE O/P EST LOW 20 MIN: CPT | Performed by: PSYCHIATRY & NEUROLOGY

## 2020-12-11 RX ORDER — CITALOPRAM 20 MG/1
30 TABLET ORAL DAILY
Qty: 135 TABLET | Refills: 1 | Status: SHIPPED | OUTPATIENT
Start: 2020-12-11 | End: 2021-06-25 | Stop reason: SDUPTHER

## 2020-12-11 RX ORDER — BUPROPION HYDROCHLORIDE 100 MG/1
100 TABLET ORAL DAILY
Qty: 30 TABLET | Refills: 1 | Status: SHIPPED | OUTPATIENT
Start: 2020-12-11 | End: 2021-08-11

## 2021-06-07 ENCOUNTER — APPOINTMENT (EMERGENCY)
Dept: RADIOLOGY | Facility: HOSPITAL | Age: 46
End: 2021-06-07
Payer: COMMERCIAL

## 2021-06-07 ENCOUNTER — HOSPITAL ENCOUNTER (EMERGENCY)
Facility: HOSPITAL | Age: 46
Discharge: HOME | End: 2021-06-07
Attending: EMERGENCY MEDICINE | Admitting: EMERGENCY MEDICINE
Payer: COMMERCIAL

## 2021-06-07 VITALS
DIASTOLIC BLOOD PRESSURE: 70 MMHG | WEIGHT: 129.3 LBS | OXYGEN SATURATION: 100 % | BODY MASS INDEX: 19.09 KG/M2 | RESPIRATION RATE: 18 BRPM | TEMPERATURE: 98.8 F | SYSTOLIC BLOOD PRESSURE: 141 MMHG | HEART RATE: 63 BPM

## 2021-06-07 DIAGNOSIS — L03.113 CELLULITIS OF RIGHT HAND: ICD-10-CM
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
## 2021-06-07 PROCEDURE — 99283 EMERGENCY DEPT VISIT LOW MDM: CPT

## 2021-06-07 PROCEDURE — 99284 EMERGENCY DEPT VISIT MOD MDM: CPT | Performed by: EMERGENCY MEDICINE

## 2021-06-07 PROCEDURE — 73130 X-RAY EXAM OF HAND: CPT

## 2021-06-07 PROCEDURE — 96365 THER/PROPH/DIAG IV INF INIT: CPT

## 2021-06-07 RX ORDER — AMOXICILLIN AND CLAVULANATE POTASSIUM 875; 125 MG/1; MG/1
1 TABLET, FILM COATED ORAL EVERY 12 HOURS
Qty: 14 TABLET | Refills: 0 | Status: SHIPPED | OUTPATIENT
Start: 2021-06-07 | End: 2021-06-14

## 2021-06-07 RX ADMIN — AMPICILLIN AND SULBACTAM 3 G: 2; 1 INJECTION, POWDER, FOR SOLUTION INTRAMUSCULAR; INTRAVENOUS at 09:04
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
## 2021-06-11 ENCOUNTER — TELEPHONE (OUTPATIENT)
Dept: PSYCHIATRY | Facility: CLINIC | Age: 46
End: 2021-06-11

## 2021-06-11 NOTE — TELEPHONE ENCOUNTER
Was a pt of Cresencio Vyas and needs to be set up with another therapist  Was last seen in Oct  Is also a pt of Dr Nahed Delcid   I will put on the wait list for therapy

## 2021-06-23 ENCOUNTER — TELEPHONE (OUTPATIENT)
Dept: PSYCHIATRY | Facility: CLINIC | Age: 46
End: 2021-06-23

## 2021-06-25 ENCOUNTER — OFFICE VISIT (OUTPATIENT)
Dept: PSYCHIATRY | Facility: CLINIC | Age: 46
End: 2021-06-25
Payer: COMMERCIAL

## 2021-06-25 ENCOUNTER — TELEPHONE (OUTPATIENT)
Dept: PSYCHIATRY | Facility: CLINIC | Age: 46
End: 2021-06-25

## 2021-06-25 VITALS — BODY MASS INDEX: 18.51 KG/M2 | HEIGHT: 69 IN | WEIGHT: 125 LBS

## 2021-06-25 DIAGNOSIS — F33.2 MAJOR DEPRESSIVE DISORDER, RECURRENT SEVERE WITHOUT PSYCHOTIC FEATURES (HCC): ICD-10-CM

## 2021-06-25 DIAGNOSIS — F41.1 GENERALIZED ANXIETY DISORDER: Primary | ICD-10-CM

## 2021-06-25 DIAGNOSIS — G47.00 INSOMNIA, UNSPECIFIED TYPE: ICD-10-CM

## 2021-06-25 PROCEDURE — 99214 OFFICE O/P EST MOD 30 MIN: CPT | Performed by: PSYCHIATRY & NEUROLOGY

## 2021-06-25 RX ORDER — CITALOPRAM 20 MG/1
10 TABLET ORAL DAILY
Qty: 60 TABLET | Refills: 1 | Status: SHIPPED | OUTPATIENT
Start: 2021-06-25 | End: 2021-08-11 | Stop reason: SDUPTHER

## 2021-06-25 RX ORDER — QUETIAPINE FUMARATE 50 MG/1
50 TABLET, FILM COATED ORAL
Qty: 30 TABLET | Refills: 2 | Status: SHIPPED | OUTPATIENT
Start: 2021-06-25 | End: 2021-08-11 | Stop reason: SDUPTHER
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
## 2021-06-25 NOTE — PATIENT INSTRUCTIONS
Generalized anxiety disorder  -     citalopram (CeleXA) 20 mg tablet; Take 0 5 tablets (10 mg total) by mouth daily For 2 weeks then increase to 20mg    Major depressive disorder, recurrent severe without psychotic features (HCC)  -     citalopram (CeleXA) 20 mg tablet; Take 0 5 tablets (10 mg total) by mouth daily For 2 weeks then increase to 20mg    Insomnia, unspecified type  -     QUEtiapine (SEROquel) 50 mg tablet;  Take 1 tablet (50 mg total) by mouth daily at bedtime      wishes to get on wait list for therapy  Follow up in 6-7 weeks  He will think about [facility]

## 2021-06-25 NOTE — BH TREATMENT PLAN
TREATMENT PLAN (Medication Management Only)        [facility]    Name and Date of Birth:  Clemencia Ledbetter 40 y o  1975  Date of Treatment Plan: June 25, 2021  Diagnosis/Diagnoses:    1  Generalized anxiety disorder    2  Major depressive disorder, recurrent severe without psychotic features ([address] )    3  Insomnia, unspecified type      Strengths/Personal Resources for Self-Care: "I try to be a good dad  "   Area/Areas of need (in own words): "anger  depression  getting better sleep and eating again  being a better person'" "I want to do better "  1  Long Term Goal: Be more clear minded  take care of his kids  and to be back with his wife     Target Date:6 months - 12/25/2021  Person/Persons responsible for completion of goal: Laine Hager, Dr Guille Scott  2  Short Term Objective (s) - How will we reach this goal?:   A  Provider new recommended medication/dosage changes and/or continue medication(s): Medication changes: I have changed Sarthak  Mata's citalopram  I am also having him maintain his buPROPion and QUEtiapine     B  N/A   C  N/A  Target Date:6 months - 12/25/2021  Person/Persons Responsible for Completion of Goal: Dr Josef Santiago Goals: declining  Treatment Modality: medication management every 6 weeks, referral for individual psychotherapy, consultation to PCP on Tuesday  Review due 180 days from date of this plan: 6 months - 12/25/2021  Expected length of service: ongoing treatment  My Physician/PA/NP and I have developed this plan together and I agree to work on the goals and objectives  I understand the treatment goals that were developed for my treatment        Treatment Plan done but not signed at time of office visit due to:  Plan reviewed by phone or in person  and verbal consent given due to Chantelle social nain

## 2021-06-29 ENCOUNTER — CONSULT (OUTPATIENT)
Dept: SURGERY | Facility: CLINIC | Age: 46
End: 2021-06-29
Payer: COMMERCIAL

## 2021-06-29 VITALS
HEIGHT: 69 IN | SYSTOLIC BLOOD PRESSURE: 120 MMHG | WEIGHT: 127.4 LBS | HEART RATE: 75 BPM | TEMPERATURE: 97.6 F | DIASTOLIC BLOOD PRESSURE: 80 MMHG | BODY MASS INDEX: 18.87 KG/M2

## 2021-06-29 DIAGNOSIS — K62.5 RECTAL BLEEDING: Primary | ICD-10-CM

## 2021-06-29 PROCEDURE — 99243 OFF/OP CNSLTJ NEW/EST LOW 30: CPT | Performed by: PHYSICIAN ASSISTANT
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
## 2021-06-29 NOTE — H&P (VIEW-ONLY)
Assessment/Plan:   Silas Tyler is a 40 y o male who is here for a:     Diagnostic  Colonoscopy  Plan: Colonoscopy for: Personal history of polyps, Blood in stools         Previous Colonoscopy and  Location of polyps if any:   4 years ago and  with polyps in the :   sigmoid colon  and rectum           Preoperative Clearance: None        ______________________________________________________    HPI:  Silas Tyler is a 40 y o male who was referred for evaluation of    Diagnostic   Currently:     Symptoms include:  positive for - abdominal pain, blood in stools and gas/bloating  negative for - change in bowel habits, change in stools, hematemesis or nausea/vomiting  Family history of colon cancer: None reported             Anticoagulation: none    LABS:      Lab Results   Component Value Date    WBC 11 64 (H) 12/05/2017    HGB 14 8 12/05/2017    HCT 43 5 12/05/2017    MCV 91 12/05/2017     12/05/2017     Lab Results   Component Value Date    K 3 9 12/05/2017     12/05/2017    CO2 29 12/05/2017    BUN 9 12/05/2017    CREATININE 0 81 12/05/2017    GLUF 103 (H) 12/05/2017    CALCIUM 9 4 12/05/2017    AST 13 12/05/2017    ALT 17 12/05/2017    ALKPHOS 50 12/05/2017    EGFR 110 12/05/2017     Lab Results   Component Value Date    HGBA1C 5 2 12/05/2017     No results found for: INR, PROTIME      No orders to display           ROS:  General ROS: negative for - chills, fatigue, fever or night sweats, weight loss  Respiratory ROS: no cough, shortness of breath, or wheezing  Cardiovascular ROS: no chest pain or dyspnea on exertion  Genito-Urinary ROS: no dysuria, trouble voiding, or hematuria  Musculoskeletal ROS: negative for - gait disturbance, joint pain or muscle pain  Neurological ROS: no TIA or stroke symptoms  GI ROS: see HPI  Skin ROS: no new rashes or lesions   Lymphatic ROS: no new adenopathy noted by pt     GYN ROS: see HPI, no new GYN history or bleeding noted  Psy ROS: no new mental or behavioral disturbances           Imaging: No new pertinent imaging studies  Patient Active Problem List   Diagnosis    Neck pain    Cervical myofascial pain syndrome    Spondylosis of cervical region without myelopathy or radiculopathy    Paresthesia of right upper extremity    Major depressive disorder, recurrent severe without psychotic features (HCC)    Generalized anxiety disorder    Other chronic pain         Allergies:  Pregabalin      Current Outpatient Medications:     buPROPion (WELLBUTRIN) 100 mg tablet, Take 1 tablet (100 mg total) by mouth daily (Patient not taking: Reported on 6/25/2021), Disp: 30 tablet, Rfl: 1    citalopram (CeleXA) 20 mg tablet, Take 0 5 tablets (10 mg total) by mouth daily For 2 weeks then increase to 20mg (Patient not taking: Reported on 6/29/2021), Disp: 60 tablet, Rfl: 1    QUEtiapine (SEROquel) 50 mg tablet, Take 1 tablet (50 mg total) by mouth daily at bedtime (Patient not taking: Reported on 6/29/2021), Disp: 30 tablet, Rfl: 2    Past Medical History:   Diagnosis Date    Arthritis     Chronic pain disorder     low back and neck    Intestinal polyp     Mild depression ([address] ) 1/31/2019    Rectal bleeding     Right lumbar radiculopathy        Past Surgical History:   Procedure Laterality Date    KNEE ARTHROSCOPY Left     knee    PA COLONOSCOPY FLX DX W/COLLJ SPEC WHEN PFRMD N/A 5/2/2017    Procedure: COLONOSCOPY with polypectomies;  Surgeon: Viola Riojas MD;  Location: [facility];   Service: General       Family History   Problem Relation Age of Onset    Cancer Mother     Depression Sister     Drug abuse Sister     Alcohol abuse Sister        Social History     Socioeconomic History    Marital status: Single     Spouse name: None    Number of children: 3    Years of education: 15    Highest education level: High school graduate   Occupational History    Occupation: unemployed   Tobacco Use    Smoking status: Never Smoker    Smokeless tobacco: Never Used   Vaping Use    Vaping Use: Never used   Substance and Sexual Activity    Alcohol use: Not Currently     Comment: socially  On [place] drank 2 beers  He used to drink a lot in the past, about 25 years ago  he had numerous DUIs  has been to [place] [id number]   Drug use: Not Currently     Types: Marijuana     Comment: occasionally    Sexual activity: Yes     Partners: Female   Other Topics Concern    None   Social History Narrative    None     Social Determinants of Health     Financial Resource Strain:     Difficulty of Paying Living Expenses:    Food Insecurity:     Worried About Running Out of Food in the Last Year:     Ran Out of Food in the Last Year:    Transportation Needs:     Lack of Transportation (Medical):      Lack of Transportation (Non-Medical):    Physical Activity:     Days of Exercise per Week:     Minutes of Exercise per Session:    Stress:     Feeling of Stress :    Social Connections:     Frequency of Communication with Friends and Family:     Frequency of Social Gatherings with Friends and Family:     Attends Oriental orthodox Services:     Active Member of Clubs or Organizations:     Attends Club or Organization Meetings:     Marital Status:    Intimate Partner Violence:     Fear of Current or Ex-Partner:     Emotionally Abused:     Physically Abused:     Sexually Abused:        Exam:   Vitals:    06/29/21 1041   BP: 120/80   Pulse: 75   Temp: 97 6 °F (36 4 °C)           ______________________________________________________    PHYSICAL EXAM  General Appearance:    Alert, cooperative, no distress, healthy     Head:    Normocephalic without obvious abnormality   Eyes:    PERRL, conjunctiva/corneas clear,        Neck:   Supple, no adenopathy, no JVD   Back:     Symmetric, no spinal or CVA tenderness   Lungs:     Clear to auscultation bilaterally,    Heart:    Regular rate and rhythm, S1 and S2 normal, no murmur   Abdomen:     Soft, NTND, +BS, rectum without external hemorrhoids or mass   Extremities:   Extremities normal  No clubbing, cyanosis or edema   Psych:   Normal Affect   Neurologic:   CNII-XII intact  Strength symmetric, speech intact                 Alli Sue PA-C  Date: 6/29/2021 Time: 10:59 AM       This report has been generated by a voice recognition software system  Therefore, there may be syntax, spelling, and/or grammatical errors  Please call if you've any questions  This  encounter has been billed by a non certified Coder  Informed consent for procedure was personally discussed, reviewed, and signed by Dr Juan Sharp  Discussion by Dr Juan Sharp was carried out regarding risks, benefits, and alternatives with the patient  Risks include but are not limited to:  bleeding, infection, and delayed wound healing or an open wound, pulmonary embolus, leaks from bowel or bile ducts or other viscus, transfusions, death  Discussed in further detail the more common complications and their rates of occurrence   was used if necessary  Patient expressed understanding of the issues discussed and wished/consented to proceed  All questions were answered by Dr Juan Sharp  Discussion performed between patient and the provider signing below  Signature:   Rashel Wolf  Date: 6/29/2021 Time: 10:59 AM                                                                                                                                        Informed consent for procedure was personally discussed, reviewed, and signed by Dr Juan Sharp  Discussion by Dr Juan Sharp was carried out regarding risks, benefits, and alternatives with the patient  Risks include but are not limited to:  bleeding, infection, and delayed wound healing or an open wound, pulmonary embolus, leaks from bowel or bile ducts or other viscus, transfusions, death  Discussed in further detail the more common complications and their rates of occurrence     was used if necessary  Patient expressed understanding of the issues discussed and wished/consented to proceed  All questions were answered by Dr Tico Mclean  Discussion performed between patient and the provider signing below  Signature:   Estephania Barnes    Date: 6/29/2021 Time: 10:59 AM           Some portions of this record may have been generated with voice recognition software  There may be translation, syntax,  or grammatical errors  Occasional wrong word or "sound-a-like" substitutions may have occurred due to the inherent limitations of the voice recognition software  Read the chart carefully and recognize, using context, where substitutions may have occurred  If you have any questions, please contact the dictating provider for clarification or correction, as needed  This encounter has been coded by a non-certified coder

## 2021-07-02 ENCOUNTER — TELEPHONE (OUTPATIENT)
Dept: GASTROENTEROLOGY | Facility: HOSPITAL | Age: 46
End: 2021-07-02

## 2021-07-02 ENCOUNTER — TELEPHONE (OUTPATIENT)
Dept: SURGERY | Facility: CLINIC | Age: 46
End: 2021-07-02

## 2021-07-02 NOTE — TELEPHONE ENCOUNTER
Called and spoke to patient  Confirmed Tuesdays maru  Patient, as of now, has no questions on prep  He's aware of arrival time  Aware transportation needed

## 2021-07-06 ENCOUNTER — ANESTHESIA EVENT (OUTPATIENT)
Dept: GASTROENTEROLOGY | Facility: HOSPITAL | Age: 46
End: 2021-07-06

## 2021-07-06 ENCOUNTER — HOSPITAL ENCOUNTER (OUTPATIENT)
Dept: GASTROENTEROLOGY | Facility: HOSPITAL | Age: 46
Setting detail: OUTPATIENT SURGERY
Discharge: HOME | End: 2021-07-06
Attending: SURGERY
Payer: COMMERCIAL

## 2021-07-06 ENCOUNTER — ANESTHESIA (OUTPATIENT)
Dept: GASTROENTEROLOGY | Facility: HOSPITAL | Age: 46
End: 2021-07-06

## 2021-07-06 VITALS
DIASTOLIC BLOOD PRESSURE: 58 MMHG | HEIGHT: 69 IN | SYSTOLIC BLOOD PRESSURE: 107 MMHG | TEMPERATURE: 98.1 F | OXYGEN SATURATION: 100 % | BODY MASS INDEX: 19.26 KG/M2 | HEART RATE: 59 BPM | RESPIRATION RATE: 16 BRPM | WEIGHT: 130 LBS

## 2021-07-06 DIAGNOSIS — K62.5 RECTAL BLEEDING: ICD-10-CM

## 2021-07-06 PROCEDURE — 88305 TISSUE EXAM BY PATHOLOGIST: CPT | Performed by: PATHOLOGY

## 2021-07-06 PROCEDURE — 45378 DIAGNOSTIC COLONOSCOPY: CPT | Performed by: SURGERY

## 2021-07-06 RX ORDER — ONDANSETRON 2 MG/ML
4 INJECTION INTRAMUSCULAR; INTRAVENOUS ONCE AS NEEDED
Status: DISCONTINUED | OUTPATIENT
Start: 2021-07-06 | End: 2021-07-10 | Stop reason: HOSPADM

## 2021-07-06 RX ORDER — PROPOFOL 10 MG/ML
INJECTION, EMULSION INTRAVENOUS AS NEEDED
Status: DISCONTINUED | OUTPATIENT
Start: 2021-07-06 | End: 2021-07-06

## 2021-07-06 RX ORDER — SODIUM CHLORIDE 9 MG/ML
125 INJECTION, SOLUTION INTRAVENOUS CONTINUOUS
Status: DISCONTINUED | OUTPATIENT
Start: 2021-07-06 | End: 2021-07-10 | Stop reason: HOSPADM

## 2021-07-06 RX ORDER — LIDOCAINE HYDROCHLORIDE 20 MG/ML
INJECTION, SOLUTION EPIDURAL; INFILTRATION; INTRACAUDAL; PERINEURAL AS NEEDED
Status: DISCONTINUED | OUTPATIENT
Start: 2021-07-06 | End: 2021-07-06

## 2021-07-06 RX ADMIN — PROPOFOL 50 MG: 10 INJECTION, EMULSION INTRAVENOUS at 11:32

## 2021-07-06 RX ADMIN — SODIUM CHLORIDE: 0.9 INJECTION, SOLUTION INTRAVENOUS at 11:38

## 2021-07-06 RX ADMIN — SODIUM CHLORIDE 125 ML/HR: 0.9 INJECTION, SOLUTION INTRAVENOUS at 10:25

## 2021-07-06 RX ADMIN — PROPOFOL 50 MG: 10 INJECTION, EMULSION INTRAVENOUS at 11:37

## 2021-07-06 RX ADMIN — PROPOFOL 50 MG: 10 INJECTION, EMULSION INTRAVENOUS at 11:23

## 2021-07-06 RX ADMIN — PROPOFOL 50 MG: 10 INJECTION, EMULSION INTRAVENOUS at 11:28

## 2021-07-06 RX ADMIN — PROPOFOL 50 MG: 10 INJECTION, EMULSION INTRAVENOUS at 11:16

## 2021-07-06 RX ADMIN — PROPOFOL 50 MG: 10 INJECTION, EMULSION INTRAVENOUS at 11:19

## 2021-07-06 RX ADMIN — PROPOFOL 50 MG: 10 INJECTION, EMULSION INTRAVENOUS at 11:26

## 2021-07-06 RX ADMIN — PROPOFOL 50 MG: 10 INJECTION, EMULSION INTRAVENOUS at 11:38

## 2021-07-06 RX ADMIN — LIDOCAINE HYDROCHLORIDE 100 MG: 20 INJECTION, SOLUTION EPIDURAL; INFILTRATION; INTRACAUDAL; PERINEURAL at 11:16

## 2021-07-06 NOTE — DISCHARGE INSTRUCTIONS
[facility]  Endoscopy Post-Operative Instructions  Dr Randa Warren MD, FACS    Procedure: Colonoscopy    Findings:  Diverticulosis and Colon Polyp(s)    Follow-Up: You will need a repeat Endoscopy in (generally)5 years  Will await final pathology report for final determination of number of years until your follow up endoscopy, if you had polyps on this exam   Different types of polyps require different lengths of follow up surveillance  Please call our office or your primary doctor's office if you have any questions, once the report is returned  You should have an endoscopy sooner than recommended if you have any symptoms of bleeding or change in stools or other concerns  You will receive a call from our office with your results, in addition to the the preliminary results you received today  You will usually receive a follow-up letter from our office in 1-2 weeks  Call the office if you do not hear from us  You are welcome to also schedule an office visit if desired to discuss the results further  It is your responsibility to contact our office for results in 1- 2 weeks if you do not hear from us  If a follow up endoscopy is needed, you are responsible for arranging that follow up appointment at the appropriate time  The office may or may not issue a reminder at that future time  Please take responsibility for your own follow up healthcare  Diet: Eat a light snack first, and then resume your previous diet  Call the office if you have unusual fevers, chills, nausea or vomiting or abdominal pain  Report to the emergency room with these are severe in nature  Activity: Do not drive a car, operate machinery, or sign legal documents for 24 hours after your procedure  Normal activity may be resumed on the day following the procedure       Call the office at [phone] for any of the following: Severe abdominal pain, significant rectal bleeding, chills, or fever above 100°, new onset of persistent cough or persistent vomiting  [address]  [facility], [address]  Phone: [phone]                Colorectal Polyps   WHAT YOU NEED TO KNOW:   Colorectal polyps are small growths of tissue in the lining of the colon and rectum  Most polyps are hyperplastic polyps and are usually benign (noncancerous)  Certain types of polyps, called adenomatous polyps, may turn into cancer  DISCHARGE INSTRUCTIONS:   Follow up with your healthcare provider or gastroenterologist as directed: You may need to return for more tests, such as another colonoscopy  Write down your questions so you remember to ask them during your visits  Reduce your risk for colorectal polyps:   · Eat a variety of healthy foods:  Healthy foods include fruit, vegetables, whole-grain breads, low-fat dairy products, beans, lean meat, and fish  Ask if you need to be on a special diet  · Maintain a healthy weight:  Ask your healthcare provider if you need to lose weight and how much you need to lose  Ask for help with a weight loss program     · Exercise:  Begin to exercise slowly and do more as you get stronger  Talk with your healthcare provider before you start an exercise program      · Limit alcohol:  Your risk for polyps increases the more you drink  · Do not smoke: If you smoke, it is never too late to quit  Ask for information about how to stop  For support and more information:   · Jazmín Arnold ([facility])  [address] [address]  Phone: [phone]  Web Address: www digestive  niddk nih gov    Contact your healthcare provider or gastroenterologist if:   · You have a fever  · You have chills, a cough, or feel weak and achy  · You have abdominal pain that does not go away or gets worse after you take medicine  · Your abdomen is swollen       · You are losing weight without trying  · You have questions or concerns about your condition or care  Seek care immediately or call 911 if:   · You have sudden shortness of breath  · You have a fast heart rate, fast breathing, or are too dizzy to stand up  · You have severe abdominal pain  · You see blood in your bowel movement  © Copyright [address] Information is for End User's use only and may not be sold, redistributed or otherwise used for commercial purposes  All illustrations and images included in CareNotes® are the copyrighted property of A D A M , Inc  or [address] [place], [place] [address]   The above information is an  only  It is not intended as medical advice for individual conditions or treatments  Talk to your doctor, nurse or pharmacist before following any medical regimen to see if it is safe and effective for you  Diverticulosis   WHAT YOU NEED TO KNOW:   What is diverticulosis? Diverticulosis is a condition that causes small pockets called diverticula to form in your intestine  These pockets make it difficult for bowel movements to pass through your digestive system  What causes diverticulosis? Diverticula form when muscles have to work hard to move bowel movements through the intestine  The force causes bulges to form at weak areas in the intestine  This may happen if you eat foods that are low in fiber  Fiber helps give your bowel movements more bulk so they are larger and easier to move through your colon  The following may increase your risk of diverticulosis:  · A history of constipation    · Age 36 or older    · Obesity    · Lack of exercise    What are the signs and symptoms of diverticulosis? Diverticulosis usually does not cause any signs or symptoms  It may cause any of the following in some people:  · Pain or discomfort in your lower abdomen    · Abdominal bloating    · Constipation or diarrhea    How is diverticulosis diagnosed?   Your healthcare provider will examine you and ask about your bowel movements, diet, and symptoms  He or she will also ask about any medical conditions you have or medicines you take  You may need any of the following:  · Blood tests  may be done to check for signs of inflammation  · A barium enema  is an x-ray of your colon that may show diverticula  A tube is put into your anus, and a liquid called barium is put through the tube  Barium is used so that healthcare providers can see your colon more clearly  · Flexible sigmoidoscopy  is a test to look for any changes in your lower intestines and rectum  It may also show the cause of any bleeding or pain  A soft, bendable tube with a light on the end will be put into your anus  It will then be moved forward into your intestine  · A colonoscopy  is used to look at your whole colon  A scope (long bendable tube with a light on the end) is used to take pictures  This test may show diverticula  · A CT scan , or CAT scan, may show diverticula  You may be given contrast liquid before the scan  Tell the healthcare provider if you have ever had an allergic reaction to contrast liquid  How is diverticulosis managed? The goal of treatment is to manage any symptoms you have and prevent other problems such as diverticulitis  Diverticulitis is swelling or infection of the diverticula  Your healthcare provider may recommend any of the following:  · Eat a variety of high-fiber foods  High-fiber foods help you have regular bowel movements  High-fiber foods include cooked beans, fruits, vegetables, and some cereals  Most adults need 25 to 35 grams of fiber each day  Your healthcare provider may recommend that you have more  Ask your healthcare provider how much fiber you need  Increase fiber slowly  You may have abdominal discomfort, bloating, and gas if you add fiber to your diet too quickly  You may need to take a fiber supplement if you are not getting enough fiber from food           · Medicines  to soften your bowel movements may be given  You may also need medicines to treat symptoms such as bloating and pain  · Drink liquids as directed  You may need to drink 2 to 3 liters (8 to 12 cups) of liquids every day  Ask your healthcare provider how much liquid to drink each day and which liquids are best for you  · Apply heat  on your abdomen for 20 to 30 minutes every 2 hours for as many days as directed  Heat helps decrease pain and muscle spasms  How can I help prevent diverticulitis or other symptoms? The following may help decrease your risk for diverticulitis or symptoms, such as bleeding  Talk to your provider about these or other things you can do to prevent problems that may occur with diverticulosis  · Exercise regularly  Ask your healthcare provider about the best exercise plan for you  Exercise can help you have regular bowel movements  Get 30 minutes of exercise on most days of the week  · Maintain a healthy weight  Ask your healthcare provider how much you should weigh  Ask him or her to help you create a weight loss plan if you are overweight  · Do not smoke  Nicotine and other chemicals in cigarettes increase your risk for diverticulitis  Ask your healthcare provider for information if you currently smoke and need help to quit  E-cigarettes or smokeless tobacco still contain nicotine  Talk to your healthcare provider before you use these products  · Ask your healthcare provider if it is safe to take NSAIDs  NSAIDs may increase your risk of diverticulitis  When should I seek immediate care? · You have severe pain on the left side of your lower abdomen  · Your bowel movements are bright or dark red  When should I contact my healthcare provider? · You have a fever and chills  · You feel dizzy or lightheaded  · You have nausea, or you are vomiting  · You have a change in your bowel movements  · You have questions or concerns about your condition or care      CARE AGREEMENT:   You have the right to help plan your care  Learn about your health condition and how it may be treated  Discuss treatment options with your healthcare providers to decide what care you want to receive  You always have the right to refuse treatment  The above information is an  only  It is not intended as medical advice for individual conditions or treatments  Talk to your doctor, nurse or pharmacist before following any medical regimen to see if it is safe and effective for you  © Copyright [address] Information is for End User's use only and may not be sold, redistributed or otherwise used for commercial purposes  All illustrations and images included in CareNotes® are the copyrighted property of A D A M , Inc  or [address] [place], [place] [address]       Diverticulosis Diet   AMBULATORY CARE:   A diverticulosis diet  includes high-fiber foods  High-fiber foods help you have regular bowel movements  Extra fiber may decrease your risk of forming new diverticula (small pockets) in your intestine  A high-fiber diet may also help prevent diverticulitis  Diverticulitis is a painful condition that occurs when diverticula become inflamed or infected  You do not need to avoid nuts, seeds, corn, or popcorn while you are on a diverticulosis diet  Contact your healthcare provider if:   · You have questions about a high-fiber diet  · You have a change in your bowel movements  · You have an upset stomach  · You have a fever  · You have pain in your lower abdomen on the left side  · You have questions about your condition or care  Amount of fiber you need: You may need 25 to 35 grams of fiber each day  Ask your dietitian or healthcare provider how much fiber you should have  Increase your intake of fiber slowly  When you eat more fiber, you may have gas and feel bloated  You may need to take a fiber supplement if you do not get enough fiber from food   Drink plenty of liquids as you increase the fiber in your diet  Your dietitian or healthcare provider may recommend 8 eight-ounce cups or more each day  Ask which liquids are best for you  Foods that are high in fiber:   · Foods with at least 4 grams of fiber per serving:      ? ? to ½ cup of high-fiber cereal (check the nutrition label on the box)    ? ½ cup of blackberries or raspberries    ? 4 dried prunes    ? 1 cooked artichoke    ? ½ cup of cooked legumes, such as lentils, or red, kidney, and contreras beans    · Foods with 1 to 3 grams of fiber per serving:      ? 1 slice of whole-wheat, pumpernickel, or rye bread    ? 4 whole-wheat crackers    ? ½ cup of cereal with 1 to 3 grams of fiber per serving (check the nutrition label on the box)    ? 1 piece of fruit, such as an apple, banana, pear, kiwi, or orange    ? 3 dates    ? ½ cup of canned apricots, fruit cocktail, peaches, or pears    ? ½ cup of raw or cooked vegetables, such as carrots, cauliflower, cabbage, spinach, squash, or corn    © Copyright [address] Information is for End User's use only and may not be sold, redistributed or otherwise used for commercial purposes  All illustrations and images included in CareNotes® are the copyrighted property of A D A M , Inc  or [facility] Rusty Rios   The above information is an  only  It is not intended as medical advice for individual conditions or treatments  Talk to your doctor, nurse or pharmacist before following any medical regimen to see if it is safe and effective for you

## 2021-07-06 NOTE — INTERVAL H&P NOTE
H&P reviewed  After examining the patient I find no changes in the patients condition since the H&P had been written      Vitals:    07/06/21 1014   BP: 133/79   Pulse: 76   Resp: 16   Temp: 98 1 °F (36 7 °C)   SpO2: 100%

## 2021-07-06 NOTE — ANESTHESIA PREPROCEDURE EVALUATION
Procedure:  COLONOSCOPY    Relevant Problems   MUSCULOSKELETAL   (+) Cervical myofascial pain syndrome   (+) Spondylosis of cervical region without myelopathy or radiculopathy      NEURO/PSYCH   (+) Cervical myofascial pain syndrome   (+) Generalized anxiety disorder   (+) Major depressive disorder, recurrent severe without psychotic features (HCC)   (+) Other chronic pain   (+) Paresthesia of right upper extremity        Physical Exam    Airway    Mallampati score: II  TM Distance: >3 FB  Neck ROM: full     Dental   No notable dental hx     Cardiovascular  Rhythm: regular, Rate: normal, Cardiovascular exam normal    Pulmonary  Pulmonary exam normal Breath sounds clear to auscultation,     Other Findings        Anesthesia Plan  ASA Score- 2     Anesthesia Type- general and IV sedation with anesthesia with ASA Monitors  Additional Monitors:   Airway Plan:           Plan Factors-    Chart reviewed  Patient summary reviewed  Induction- intravenous  Postoperative Plan-     Informed Consent- Anesthetic plan and risks discussed with patient  I personally reviewed this patient with the CRNA  Discussed and agreed on the Anesthesia Plan with the CRNA  Chuck Munguia

## 2021-07-07 ENCOUNTER — TELEPHONE (OUTPATIENT)
Dept: SURGERY | Facility: CLINIC | Age: 46
End: 2021-07-07

## 2021-07-07 NOTE — TELEPHONE ENCOUNTER
S/P = Colonoscopy = 7/6/21    Unable to reach patient left message to call the office back  Path pending

## 2021-08-11 ENCOUNTER — OFFICE VISIT (OUTPATIENT)
Dept: PSYCHIATRY | Facility: CLINIC | Age: 46
End: 2021-08-11
Payer: COMMERCIAL

## 2021-08-11 DIAGNOSIS — F33.2 MAJOR DEPRESSIVE DISORDER, RECURRENT SEVERE WITHOUT PSYCHOTIC FEATURES (HCC): Primary | ICD-10-CM

## 2021-08-11 DIAGNOSIS — G47.00 INSOMNIA, UNSPECIFIED TYPE: ICD-10-CM

## 2021-08-11 DIAGNOSIS — F41.1 GENERALIZED ANXIETY DISORDER: ICD-10-CM

## 2021-08-11 PROCEDURE — 99214 OFFICE O/P EST MOD 30 MIN: CPT | Performed by: PSYCHIATRY & NEUROLOGY

## 2021-08-11 RX ORDER — QUETIAPINE FUMARATE 50 MG/1
50 TABLET, FILM COATED ORAL
Qty: 90 TABLET | Refills: 2 | Status: SHIPPED | OUTPATIENT
Start: 2021-08-11 | End: 2021-10-12 | Stop reason: SDUPTHER

## 2021-08-11 RX ORDER — CITALOPRAM 20 MG/1
30 TABLET ORAL DAILY
Qty: 135 TABLET | Refills: 2 | Status: SHIPPED | OUTPATIENT
Start: 2021-08-11 | End: 2022-03-11 | Stop reason: SDUPTHER

## 2021-08-11 NOTE — PSYCH
Subjective:     Patient ID: Celestino Stuart is a 40 y o  male with depression, anxiety, and insomnia being seen for a follow up visit  He is currently on Celexa and Seroquel  Last visit he had been off of his medications for a while and restarted on Celexa and Seroquel  HPI ROS Appetite Changes and Sleep: the patient stated that the pharmacy took 3 weeks to get his medications due to some insurance mishap and he started to take his meds finally about 6 weeks ago  He is feeling better and getting a lot of sleep, about 7 hours  He denied any side effects  He has been able to do more things with the kids  He is feeling less depressed and anxiety  He has been speaking with his wife and his brothers- in-law  He is also staying with her a few days a week and then other days with his parents  He has been physically active in the yard and this has been helping as well  He denied SI/HI  Denied drug and alcohol use  He has to do an AA class for a DUI he got in 4505 to get his license back  Review Of Systems:     Mood Anxiety and Depression improving   Behavior Normal    Thought Content Normal   General Relationship Problems and Emotional Problems   Personality Normal   Other Psych Symptoms Normal   Constitutional appetite is improved, unsure if his weight has changed  ENT Negative   Cardiovascular Negative   Respiratory Negative   Gastrointestinal Negative   Genitourinary Negative   Musculoskeletal Negative   Integumentary Negative   Neurological Negative   Endocrine Normal    Other Symptoms Normal              Laboratory Results: No results found for this or any previous visit      Substance Abuse History:  Social History     Substance and Sexual Activity   Drug Use Not Currently    Types: Marijuana    Comment: occasionally       Family Psychiatric History:   Family History   Problem Relation Age of Onset    Cancer Mother     Depression Sister     Drug abuse Sister     Alcohol abuse Sister        The following portions of the patient's history were reviewed and updated as appropriate: allergies, current medications, past family history, past medical history, past social history, past surgical history and problem list     Social History     Socioeconomic History    Marital status: Single     Spouse name: Not on file    Number of children: 3    Years of education: 15    Highest education level: High school graduate   Occupational History    Occupation: unemployed   Tobacco Use    Smoking status: Never Smoker    Smokeless tobacco: Never Used   Vaping Use    Vaping Use: Never used   Substance and Sexual Activity    Alcohol use: Not Currently     Comment: socially  On Marjorie drank 2 beers  He used to drink a lot in the past, about 25 years ago  he had numerous DUIs  has been to [place] [id number]   Drug use: Not Currently     Types: Marijuana     Comment: occasionally    Sexual activity: Yes     Partners: Female   Other Topics Concern    Not on file   Social History Narrative    Not on file     Social Determinants of Health     Financial Resource Strain:     Difficulty of Paying Living Expenses:    Food Insecurity:     Worried About Running Out of Food in the Last Year:     [address] in the Last Year:    Transportation Needs:     Lack of Transportation (Medical):      Lack of Transportation (Non-Medical):    Physical Activity:     Days of Exercise per Week:     Minutes of Exercise per Session:    Stress:     Feeling of Stress :    Social Connections:     Frequency of Communication with Friends and Family:     Frequency of Social Gatherings with Friends and Family:     Attends Hoahaoism Services:     Active Member of Clubs or Organizations:     Attends Club or Organization Meetings:     Marital Status:    Intimate Partner Violence:     Fear of Current or Ex-Partner:     Emotionally Abused:     Physically Abused:     Sexually Abused:      Social History     Social History Narrative    Not on file       Objective:       Mental status:  Appearance calm and cooperative , adequate hygiene and grooming and good eye contact    Mood dysphoric   Affect affect was tearful   Speech a normal rate   Thought Processes coherent/organized and normal thought processes   Hallucinations no hallucinations present    Thought Content no delusions   Abnormal Thoughts no suicidal thoughts  and no homicidal thoughts    Orientation  oriented to person and place and time   Remote Memory short term memory intact and long term memory intact   Attention Span concentration intact   Intellect Appears to be of Average Intelligence   Insight Insight intact   Judgement judgment was impaired   Muscle Strength Muscle strength and tone were normal and Normal gait    Language no difficulty naming common objects and no difficulty repeating a phrase    Fund of Knowledge displays adequate knowledge of current events and adequate fund of knowledge regarding past history   Pain none   Pain Scale 0       Assessment/Plan:       Diagnoses and all orders for this visit:    Major depressive disorder, recurrent severe without psychotic features (HCC)  -     citalopram (CeleXA) 20 mg tablet; Take 1 5 tablets (30 mg total) by mouth daily    Generalized anxiety disorder  -     citalopram (CeleXA) 20 mg tablet; Take 1 5 tablets (30 mg total) by mouth daily    Insomnia, unspecified type  -     QUEtiapine (SEROquel) 50 mg tablet; Take 1 tablet (50 mg total) by mouth daily at bedtime      on wait list for therapy  Follow up in 2 months      Treatment Recommendations- Risks Benefits      Immediate Medical/Psychiatric/Psychotherapy Treatments and Any Precautions: since getting back onto his medications, he has been doing a lot better  He is sleeping more hours, regained his appetite, and anxiety/depression are improving  He is spending time with his kids and his wife  Continue seroquel, but increase celexa to 30mg for improved mood and anxiety      Risks, Benefits And Possible Side Effects Of Medications:  PSYCH RISK, BENEFITS AND POSSIBLE SIDE EFFECTS discussed    Controlled Medication Discussion: No records found for controlled prescriptions according to [address]           This note was not shared with the patient due to reasonable likelihood of causing patient harm

## 2021-08-11 NOTE — PATIENT INSTRUCTIONS
Major depressive disorder, recurrent severe without psychotic features ([facility] )  -     citalopram (CeleXA) 20 mg tablet; Take 1 5 tablets (30 mg total) by mouth daily    Generalized anxiety disorder  -     citalopram (CeleXA) 20 mg tablet; Take 1 5 tablets (30 mg total) by mouth daily    Insomnia, unspecified type  -     QUEtiapine (SEROquel) 50 mg tablet;  Take 1 tablet (50 mg total) by mouth daily at bedtime      on wait list for therapy  Follow up in 2 months

## 2021-08-27 ENCOUNTER — TELEPHONE (OUTPATIENT)
Dept: PSYCHIATRY | Facility: CLINIC | Age: 46
End: 2021-08-27

## 2021-09-01 ENCOUNTER — TELEPHONE (OUTPATIENT)
Dept: PSYCHIATRY | Facility: CLINIC | Age: 46
End: 2021-09-01

## 2021-09-01 NOTE — TELEPHONE ENCOUNTER
9/1 @ 9:58 LMOM requesting a call back to r/s 10/22 appointment  Provider on leave    Returning 2/22

## 2021-09-08 ENCOUNTER — TELEPHONE (OUTPATIENT)
Dept: PSYCHIATRY | Facility: CLINIC | Age: 46
End: 2021-09-08

## 2021-09-21 ENCOUNTER — OFFICE VISIT (OUTPATIENT)
Dept: BEHAVIORAL/MENTAL HEALTH CLINIC | Facility: CLINIC | Age: 46
End: 2021-09-21
Payer: COMMERCIAL

## 2021-09-21 DIAGNOSIS — F33.2 MAJOR DEPRESSIVE DISORDER, RECURRENT SEVERE WITHOUT PSYCHOTIC FEATURES (HCC): Primary | ICD-10-CM

## 2021-09-21 DIAGNOSIS — F41.1 GENERALIZED ANXIETY DISORDER: ICD-10-CM

## 2021-09-21 PROCEDURE — 90791 PSYCH DIAGNOSTIC EVALUATION: CPT | Performed by: PSYCHOLOGIST

## 2021-09-21 NOTE — PSYCH
Assessment/Plan:      There are no diagnoses linked to this encounter  Subjective:      Patient ID: Natanael Weber is a 40 y o  male  HPI:     Pre-morbid level of function and History of Present Illness:  Ramone reported "I have some anxiety and depression "  He indicated that his depression symptoms are manifested as  "I feel like I am in the way, taking up space " He reported lacking a purpose, lacking motivation, sadness, crying, feeling of hopelessness and helplessness  Feels like "nothing is going right for me " Jw Horvath had reported being arrest for a "robbery charge" in 1996 and DUI charge in 2001  Ramone indicated that he was incarcerated for a period of 3 years due to the robbery charge  In fact he presented to the session with an ankle bractlet due to being on house arrest for driving with a suspended license  He reported that his anxiety symptoms are manifested excessive worry, tension,  "getting antsy", fear that something bad might happen  He noted that "everytine my family goes I need to constantly call to make sure everything is going smooth " He also reported physiological symptoms of anxiety such as Tachycardia and sweaty hands  Furthermore, he reported "I worry about everything "    Previous Psychiatric/psychological treatment/year: Patrick Saenz and Samantha at [facility] and saw Dr John Askew (therapy)  Noted therapy was helpful, but he stopped when he was feeling better  Current Psychiatrist/Therapist: Dr Nemo Velasquez for medication and this session establishes care with this provider for therapeutic services  Outpatient and/or Partial and Other [employer] Used (CTT, I[facility], VNA):   Only received services at the outpatient level of care       Problem Assessment:     SOCIAL/VOCATION:  Family Constellation (include parents, relationship with each and pertinent Psych/Medical History):     Family History   Problem Relation Age of Onset    Cancer Mother     Depression Sister     Drug abuse Sister    [facility] Alcohol abuse Sister        Mother: Mother is sick with cancer  Supportive  Spouse: Wife- not   Have been together for 19 years  Supportive   Father: Never met biological dad   Children: 21year old daughter- supportive                  15year old son- supportive  Focused on self                   3year old son   Sibling: older brother in [place]  And 4 sister (one is [place], one is [address]  one in [place] and one in [facility])  Other: Step-dad- has been in Ramone's life since he was 4 years ol  Supportive  Jw Horvath relates best to wife  he lives with wife and two sons  he does not live alone  Domestic Violence: No past history of domestic violence and There is not suspected domestic violence    Additional Comments related to family/relationships/peer support: N/A  School or Work History (strengths/limitations/needs): Unemployed  In 2012 got hurt- "fighting disability "     Her highest grade level achieved was  [facility] history includes N/A    Financial status includes being a stressors  Has not worked since 2012  LEISURE ASSESSMENT (Include past and present hobbies/interests and level of involvement (Ex: Group/Club Affiliations): Watch son play sports, fishing    his primary language is [place]  Preferred language is [place]  Ethnic considerations are   Religions affiliations and level of involvement N/A   Does spirituality help you cope? No    FUNCTIONAL STATUS: There has been a recent change in Jw Horvath ability to do the following: does not need can service    Level of Assistance Needed/By Whom?: Able to do ADL's by self  Ramone learns best by  reading, listening, demonstration and picture    SUBSTANCE ABUSE ASSESSMENT: no substance abuse    Substance/Route/Age/Amount/Frequency/Last Use: up until last year smoking cannabis all the time- at least 3 -4 times a day   Stopped last year because of finances and "not needing it "     DETOX HISTORY: N/A    Previous detox/rehab treatment: N/A    HEALTH ASSESSMENT: no referral to PCP needed    LEGAL: No Mental Health Advance Directive or Power of  on file    Prenatal History: N/A    Delivery History: N/A    Developmental Milestones: N/A  Temperament as an infant was N/A  Temperament as a toddler was N/A  Temperament at school age was N/A  Temperament as a teenager was N/A  Risk Assessment:   The following ratings are based on my review of records    Risk of Harm to Self:   Demographic risk factors include male  Historical Risk Factors include criminal behaviors and substance abuse or dependence  Recent Specific Risk Factors include feelings of guilt or self blame and diagnosis of depression   Additional Factors for a Child or Adolescent N/A    Risk of Harm to Others:   Demographic Risk Factors include male  Historical Risk Factors include history of violence and history of court appearance for violence  Recent Specific Risk Factors include multiple stressors    Access to Weapons:   [facility] has access to the following weapons: denied access to weapons  The following steps have been taken to ensure weapons are properly secured: N/A    Based on the above information, the client presents the following risk of harm to self or others:  low    The following interventions are recommended:   no intervention changes    Notes regarding this Risk Assessment: Denied SI/HI/SIB, Forward thinking and children, family and support system serve as protective factors          Review Of Systems:     Mood Depression   Behavior Normal    Thought Content Normal   General Emotional Problems and Decreased Functioning   Personality Normal   Other Psych Symptoms Normal   Constitutional Normal   ENT Normal   Cardiovascular Normal    Respiratory Normal    Gastrointestinal Normal   Genitourinary Normal    Musculoskeletal Negative   Integumentary Normal    Neurological Normal    Endocrine Normal          Mental status:  Appearance calm and cooperative , adequate hygiene and grooming and good eye contact   Romelia Farmer was wearing an anklet bractlet due to being on house arrest (driving with suspended license)      Mood depressed   Affect affect appropriate    Speech a normal rate and fluent   Thought Processes coherent/organized and normal thought processes   Hallucinations no hallucinations present    Thought Content no delusions   Abnormal Thoughts no suicidal thoughts  and no homicidal thoughts    Orientation  oriented to person and place and time   Remote Memory short term memory intact and long term memory intact   Attention Span concentration intact   Intellect Appears to be of Average Intelligence   Fund of Knowledge displays adequate knowledge of current events, adequate fund of knowledge regarding past history and adequate fund of knowledge regarding vocabulary    Insight Insight intact   Judgement judgment was impaired   Muscle Strength Muscle strength and tone were normal   Language no difficulty naming common objects, no difficulty repeating a phrase  and no difficulty writing a sentence    Pain none   Pain Scale 0

## 2021-09-21 NOTE — BH TREATMENT PLAN
Celestino Stuart  1975       Date of Initial Treatment Plan: 09/21/21   Date of Current Treatment Plan: 09/21/21    Treatment Plan Number 1     Strengths/Personal Resources for Self Care:  "I have a good support group"     Diagnosis:   1  Major depressive disorder, recurrent severe without psychotic features ([address] )     2  Generalized anxiety disorder         Area of Needs: "trying to get my mind cleared so that I could function "      Long Term Goal 1:  "to function normally "    Target Date: 1/22-3/22  Completion Date: TBD         Short Term Objectives for Goal 1: Increase behavioral activation       Short Term Objectives for Goal 1: Increase social interactions- "Talk to people"      Short Term Objectives for Goal 1: Increase honest communication about feelings- "I don't always tell people how I feel "        Short Term Objectives for Goal 1: Increase honesty with self       Short Term Objectives for Goal 1:  Compliance with therapy and medication       Short Term Objectives for Goal 1:  identify triggers of depression and anxiety       Short Term Objectives for Goal 1:   Practice coping skills      Long Term Goal 2: N/A    Target Date: N/A  Completion Date: N/A    Short Term Objectives for Goal 2: N/A         Long Term Goal # 3: N/A     Target Date: N/A  Completion Date: N/A    Short Term Objectives for Goal 3: N/A    GOAL 1: Modality: Individual 2x per month   Completion Date TBD, Medication Management and The person(s) responsible for carrying out the plan is  Ramone, Psychatrist and Psychologist     GOAL 2: Modality: N/A    GOAL 3: Modality: N/A      Behavioral Health Treatment Plan [facility]: Diagnosis and Treatment Plan explained to [address] relates understanding diagnosis and is agreeable to Treatment Plan         Client Comments : Please share your thoughts, feelings, need and/or experiences regarding your treatment plan: N/A

## 2021-10-05 ENCOUNTER — OFFICE VISIT (OUTPATIENT)
Dept: BEHAVIORAL/MENTAL HEALTH CLINIC | Facility: CLINIC | Age: 46
End: 2021-10-05
Payer: COMMERCIAL

## 2021-10-05 DIAGNOSIS — F41.1 GENERALIZED ANXIETY DISORDER: Primary | ICD-10-CM

## 2021-10-05 DIAGNOSIS — F33.2 MAJOR DEPRESSIVE DISORDER, RECURRENT SEVERE WITHOUT PSYCHOTIC FEATURES (HCC): ICD-10-CM

## 2021-10-05 PROCEDURE — 90834 PSYTX W PT 45 MINUTES: CPT | Performed by: PSYCHOLOGIST

## 2021-10-12 DIAGNOSIS — G47.00 INSOMNIA, UNSPECIFIED TYPE: ICD-10-CM

## 2021-10-12 RX ORDER — QUETIAPINE FUMARATE 50 MG/1
50 TABLET, FILM COATED ORAL
Qty: 90 TABLET | Refills: 2 | Status: SHIPPED | OUTPATIENT
Start: 2021-10-12 | End: 2022-03-11 | Stop reason: SDUPTHER

## 2021-10-19 ENCOUNTER — OFFICE VISIT (OUTPATIENT)
Dept: BEHAVIORAL/MENTAL HEALTH CLINIC | Facility: CLINIC | Age: 46
End: 2021-10-19
Payer: COMMERCIAL

## 2021-10-19 DIAGNOSIS — F41.1 GENERALIZED ANXIETY DISORDER: ICD-10-CM

## 2021-10-19 DIAGNOSIS — F33.2 MAJOR DEPRESSIVE DISORDER, RECURRENT SEVERE WITHOUT PSYCHOTIC FEATURES (HCC): Primary | ICD-10-CM

## 2021-10-19 PROCEDURE — 90834 PSYTX W PT 45 MINUTES: CPT | Performed by: PSYCHOLOGIST

## 2021-11-02 ENCOUNTER — OFFICE VISIT (OUTPATIENT)
Dept: BEHAVIORAL/MENTAL HEALTH CLINIC | Facility: CLINIC | Age: 46
End: 2021-11-02
Payer: COMMERCIAL

## 2021-11-02 DIAGNOSIS — F33.2 MAJOR DEPRESSIVE DISORDER, RECURRENT SEVERE WITHOUT PSYCHOTIC FEATURES (HCC): Primary | ICD-10-CM

## 2021-11-02 DIAGNOSIS — F41.1 GENERALIZED ANXIETY DISORDER: ICD-10-CM

## 2021-11-02 PROCEDURE — 90832 PSYTX W PT 30 MINUTES: CPT | Performed by: PSYCHOLOGIST

## 2021-11-15 ENCOUNTER — TELEPHONE (OUTPATIENT)
Dept: PSYCHIATRY | Facility: CLINIC | Age: 46
End: 2021-11-15

## 2021-11-30 ENCOUNTER — OFFICE VISIT (OUTPATIENT)
Dept: BEHAVIORAL/MENTAL HEALTH CLINIC | Facility: CLINIC | Age: 46
End: 2021-11-30
Payer: COMMERCIAL

## 2021-11-30 DIAGNOSIS — F33.2 MAJOR DEPRESSIVE DISORDER, RECURRENT SEVERE WITHOUT PSYCHOTIC FEATURES (HCC): Primary | ICD-10-CM

## 2021-11-30 DIAGNOSIS — F41.1 GENERALIZED ANXIETY DISORDER: ICD-10-CM

## 2021-11-30 PROCEDURE — 90834 PSYTX W PT 45 MINUTES: CPT | Performed by: PSYCHOLOGIST

## 2021-12-14 ENCOUNTER — OFFICE VISIT (OUTPATIENT)
Dept: BEHAVIORAL/MENTAL HEALTH CLINIC | Facility: CLINIC | Age: 46
End: 2021-12-14
Payer: COMMERCIAL

## 2021-12-14 DIAGNOSIS — F41.1 GENERALIZED ANXIETY DISORDER: ICD-10-CM

## 2021-12-14 DIAGNOSIS — F33.2 MAJOR DEPRESSIVE DISORDER, RECURRENT SEVERE WITHOUT PSYCHOTIC FEATURES (HCC): Primary | ICD-10-CM

## 2021-12-14 PROCEDURE — 90834 PSYTX W PT 45 MINUTES: CPT | Performed by: PSYCHOLOGIST

## 2022-03-08 ENCOUNTER — DOCUMENTATION (OUTPATIENT)
Dept: BEHAVIORAL/MENTAL HEALTH CLINIC | Facility: CLINIC | Age: 47
End: 2022-03-08

## 2022-03-08 DIAGNOSIS — F41.1 GENERALIZED ANXIETY DISORDER: ICD-10-CM

## 2022-03-08 DIAGNOSIS — F33.2 MAJOR DEPRESSIVE DISORDER, RECURRENT SEVERE WITHOUT PSYCHOTIC FEATURES (HCC): Primary | ICD-10-CM

## 2022-03-08 NOTE — PROGRESS NOTES
Assessment/Plan:      Diagnoses and all orders for this visit:    Major depressive disorder, recurrent severe without psychotic features ([facility] )    Generalized anxiety disorder          Subjective:     Patient ID: Pinky Shane is a 56 y o  male  Outpatient Discharge Summary:   Admission Date: 09/21/21  Robertyael Abelyair was referred by Self and Dr Bryanna Garner   Discharge Date: 03/8/22    Discharge Diagnosis:    1  Major depressive disorder, recurrent severe without psychotic features ([facility] )     2  Generalized anxiety disorder         Treating Physician: Dr Bryanna Garner  Treatment Complications: negative view of self, lack of consistency in terms of follow up  Presenting Problem: Elisabet Geronimo reported "I have some anxiety and depression "  He indicated that his depression symptoms are manifested as  "I feel like I am in the way, taking up space " He reported lacking a purpose, lacking motivation, sadness, crying, feeling of hopelessness and helplessness  Feels like "nothing is going right for me " Elisabet Geronimo had reported being arrest for a "robbery charge" in 1996 and DUI charge in 2001  Ramone indicated that he was incarcerated for a period of 3 years due to the robbery charge  In fact he presented to the session with an ankle bractlet due to being on house arrest for driving with a suspended license  He reported that his anxiety symptoms are manifested excessive worry, tension,  "getting antsy", fear that something bad might happen  He noted that "everytine my family goes I need to constantly call to make sure everything is going smooth " He also reported physiological symptoms of anxiety such as Tachycardia and sweaty hands  Furthermore, he reported "I worry about everything "    Course of treatment includes:    medication management, psychoeducation and individual therapy   Treatment Progress: fair  Criteria for Discharge: demonstrated failure to uphold their treatment plan/contract   Ramone's last individual therapy appointment was on 12 /14/21  Aftercare recommendations include Continue medication management with Dr Stephanie Cornelius    Discharge Medications include:  Current Outpatient Medications:     citalopram (CeleXA) 20 mg tablet, Take 1 5 tablets (30 mg total) by mouth daily, Disp: 135 tablet, Rfl: 2    QUEtiapine (SEROquel) 50 mg tablet, Take 1 tablet (50 mg total) by mouth daily at bedtime, Disp: 90 tablet, Rfl: 2    Prognosis: fair

## 2022-03-09 ENCOUNTER — TELEPHONE (OUTPATIENT)
Dept: PSYCHIATRY | Facility: CLINIC | Age: 47
End: 2022-03-09

## 2022-03-10 NOTE — TELEPHONE ENCOUNTER
DISCHARGE LETTER for PRERNA Bryson  (certified and regular) placed in outgoing mail on 3/10/2022      Article #:  [id number]    Address:  [address] [place] [address]

## 2022-03-11 ENCOUNTER — TELEMEDICINE (OUTPATIENT)
Dept: PSYCHIATRY | Facility: CLINIC | Age: 47
End: 2022-03-11
Payer: COMMERCIAL

## 2022-03-11 DIAGNOSIS — F41.1 GENERALIZED ANXIETY DISORDER: ICD-10-CM

## 2022-03-11 DIAGNOSIS — G47.00 INSOMNIA, UNSPECIFIED TYPE: Primary | ICD-10-CM

## 2022-03-11 DIAGNOSIS — F33.2 MAJOR DEPRESSIVE DISORDER, RECURRENT SEVERE WITHOUT PSYCHOTIC FEATURES (HCC): ICD-10-CM

## 2022-03-11 PROCEDURE — 99213 OFFICE O/P EST LOW 20 MIN: CPT | Performed by: PSYCHIATRY & NEUROLOGY

## 2022-03-11 RX ORDER — QUETIAPINE FUMARATE 50 MG/1
50 TABLET, FILM COATED ORAL
Qty: 90 TABLET | Refills: 2 | Status: SHIPPED | OUTPATIENT
Start: 2022-03-11

## 2022-03-11 RX ORDER — CITALOPRAM 20 MG/1
30 TABLET ORAL DAILY
Qty: 135 TABLET | Refills: 2 | Status: SHIPPED | OUTPATIENT
Start: 2022-03-11

## 2022-03-11 NOTE — PSYCH
Virtual Regular Visit    Verification of patient location:    Patient is located in the following state in which I hold an active license [place]      Assessment/Plan:    Problem List Items Addressed This Visit        Other    Major depressive disorder, recurrent severe without psychotic features (HCC)    Relevant Medications    citalopram (CeleXA) 20 mg tablet    QUEtiapine (SEROquel) 50 mg tablet    Generalized anxiety disorder    Relevant Medications    citalopram (CeleXA) 20 mg tablet    QUEtiapine (SEROquel) 50 mg tablet      Other Visit Diagnoses     Insomnia, unspecified type    -  Primary    Relevant Medications    QUEtiapine (SEROquel) 50 mg tablet              Reason for visit is   Chief Complaint   Patient presents with    Medication Management    Virtual Regular Visit        Encounter provider Veronica Genao MD    Provider located at [address] [address] [phone]      Recent Visits  Date Type Provider Dept   03/09/22 Telephone [facility] [address] recent visits within past 7 days and meeting all other requirements  Today's Visits  Date Type Provider Dept   03/11/22 [address], MD [address] today's visits and meeting all other requirements  Future Appointments  No visits were found meeting these conditions  Showing future appointments within next 150 days and meeting all other requirements       The patient was identified by name and date of birth  Alexis Blum was informed that this is a telemedicine visit and [facility] the [facility] and patient was informed that this is a secure, HIPAA-compliant platform  He agrees to proceed     My office door was closed  No one else was in the room  He acknowledged consent and understanding of privacy and security of the video platform   The patient has agreed to participate and understands they can discontinue the visit at any time  Patient is aware this is a billable service  Subjective:     Patient ID: Aditya Brody is a 56 y o  male with depression, anxiety, and insomnia being seen for a follow up visit  He is currently on Celexa and Seroquel  HPI ROS Appetite Changes and Sleep: the patient stated that he has been doing well  He has been trying to stay busy and active with his son  His therapist schedule was not compatible with his any longer so he needs a new therapist  He believes the medications are working, but he is only sleeping 4-5 hours a night  This started 2 months ago and around that time he started to  wrestling at night again and thus has trouble falling asleep after a work out  This just finished last week  He started to work out in the morning today  He denied SI/HI  His anxiety has been controlled some days better then others, but he is trying to stay busy and this helps  His relationship with his wife is better and they try to spend a lot of time together  They go on date night once a week  Appetite is increased and he has gained 40 lbs this happened after house arrest for driving without a license  He was only allowed to go see his therapist and back home  He ate a lot then  He is trying to get down some of the weight, just not as fast as he did  Review Of Systems:     Mood Anxiety and Depression improving   Behavior Normal    Thought Content Normal   General Emotional Problems and Sleep Disturbances   Personality Normal   Other Psych Symptoms Normal   Constitutional As Noted in HPI   ENT Negative   Cardiovascular Negative   Respiratory Negative   Gastrointestinal Negative   Genitourinary Negative   Musculoskeletal Negative   Integumentary Negative   Neurological Negative   Endocrine Normal    Other Symptoms Normal              Laboratory Results: No results found for this or any previous visit      Substance Abuse History:  Social History     Substance and Sexual Activity   Drug Use Not Currently    Types: Marijuana    Comment: occasionally       Family Psychiatric History:   Family History   Problem Relation Age of Onset   Karla Leisure Cancer Mother     Depression Sister     Drug abuse Sister     Alcohol abuse Sister        The following portions of the patient's history were reviewed and updated as appropriate: allergies, current medications, past family history, past medical history, past social history, past surgical history and problem list     Social History     Socioeconomic History    Marital status: Single     Spouse name: Not on file    Number of children: 3    Years of education: 15    Highest education level: High school graduate   Occupational History    Occupation: unemployed   Tobacco Use    Smoking status: Never Smoker    Smokeless tobacco: Never Used   Vaping Use    Vaping Use: Never used   Substance and Sexual Activity    Alcohol use: Not Currently     Comment: socially  On [place] drank 2 beers  He used to drink a lot in the past, about 25 years ago  he had numerous DUIs  has been to [place] [id number]      Drug use: Not Currently     Types: Marijuana     Comment: occasionally    Sexual activity: Yes     Partners: Female   Other Topics Concern    Not on file   Social History Narrative    Not on file     Social Determinants of Health     Financial Resource Strain: Not on file   Food Insecurity: Not on file   Transportation Needs: Not on file   Physical Activity: Not on file   Stress: Not on file   Social Connections: Not on file   Intimate Partner Violence: Not on file   Housing Stability: Not on file     Social History     Social History Narrative    Not on file       Objective:       Mental status:  Appearance calm and cooperative , adequate hygiene and grooming and good eye contact    Mood euthymic   Affect affect was broad   Speech a normal rate   Thought Processes coherent/organized and normal thought processes Hallucinations no hallucinations present    Thought Content no delusions   Abnormal Thoughts no suicidal thoughts  and no homicidal thoughts    Orientation  oriented to person and place and time   Remote Memory short term memory intact and long term memory intact   Attention Span concentration intact   Intellect Appears to be of Average Intelligence   Insight Insight intact   Judgement judgment was impaired   Muscle Strength Muscle strength and tone were normal   Language no difficulty naming common objects and no difficulty repeating a phrase    Fund of Knowledge displays adequate knowledge of current events and adequate fund of knowledge regarding past history   Pain none   Pain Scale 0       Assessment/Plan:       Diagnoses and all orders for this visit:    Insomnia, unspecified type  -     QUEtiapine (SEROquel) 50 mg tablet; Take 1 tablet (50 mg total) by mouth daily at bedtime    Major depressive disorder, recurrent severe without psychotic features (HCC)  -     citalopram (CeleXA) 20 mg tablet; Take 1 5 tablets (30 mg total) by mouth daily    Generalized anxiety disorder  -     citalopram (CeleXA) 20 mg tablet; Take 1 5 tablets (30 mg total) by mouth daily        Follow up in 3 months with ludwig lynn      Treatment Recommendations- Risks Benefits      Immediate Medical/Psychiatric/Psychotherapy Treatments and Any Precautions: he has been doing very well and stable on meds  He has had some insomnia due to night time work outs, but he will be changing to morning exercise and hopeful this will help  He can also increase the seroquel to 75mg if needed       Risks, Benefits And Possible Side Effects Of Medications:  PSYCH RISK, BENEFITS AND POSSIBLE SIDE EFFECTS discussed    Controlled Medication Discussion: No records found for controlled prescriptions according to [address]           This note was not shared with the patient due to reasonable likelihood of causing patient harm              I spent 20  minutes with patient today in which greater than 50% of the time was spent in counseling/coordination of care regarding treatment    [address] verbally agrees to participate in [employer]  Pt is aware that [employer] could be limited without vital signs or the ability to perform a full hands-on physical exam  Ramone Reilly understands he or the provider may request at any time to terminate the video visit and request the patient to seek care or treatment in person

## 2022-03-11 NOTE — PATIENT INSTRUCTIONS
Insomnia, unspecified type  -     QUEtiapine (SEROquel) 50 mg tablet; Take 1 tablet (50 mg total) by mouth daily at bedtime    Major depressive disorder, recurrent severe without psychotic features (HCC)  -     citalopram (CeleXA) 20 mg tablet; Take 1 5 tablets (30 mg total) by mouth daily    Generalized anxiety disorder  -     citalopram (CeleXA) 20 mg tablet;  Take 1 5 tablets (30 mg total) by mouth daily        Follow up in 3 months with ludwig lynn

## 2022-03-22 NOTE — TELEPHONE ENCOUNTER
Certificate for the Discharge Letter was signed/received on 3/22/2022  A copy has been scanned into [employer]

## 2022-04-20 ENCOUNTER — TELEPHONE (OUTPATIENT)
Dept: PSYCHIATRY | Facility: CLINIC | Age: 47
End: 2022-04-20

## 2022-07-15 ENCOUNTER — TELEPHONE (OUTPATIENT)
Dept: PSYCHIATRY | Facility: CLINIC | Age: 47
End: 2022-07-15

## 2022-07-28 ENCOUNTER — OFFICE VISIT (OUTPATIENT)
Dept: FAMILY MEDICINE CLINIC | Facility: CLINIC | Age: 47
End: 2022-07-28

## 2022-07-28 VITALS
DIASTOLIC BLOOD PRESSURE: 60 MMHG | HEIGHT: 69 IN | BODY MASS INDEX: 23.85 KG/M2 | SYSTOLIC BLOOD PRESSURE: 102 MMHG | TEMPERATURE: 97.4 F | WEIGHT: 161 LBS | OXYGEN SATURATION: 99 % | HEART RATE: 60 BPM | RESPIRATION RATE: 18 BRPM

## 2022-07-28 DIAGNOSIS — I21.09 ST ELEVATION MYOCARDIAL INFARCTION (STEMI) INVOLVING OTHER CORONARY ARTERY OF ANTERIOR WALL (HCC): ICD-10-CM

## 2022-07-28 DIAGNOSIS — R73.03 PRE-DIABETES: ICD-10-CM

## 2022-07-28 DIAGNOSIS — F33.2 MAJOR DEPRESSIVE DISORDER, RECURRENT SEVERE WITHOUT PSYCHOTIC FEATURES (HCC): Primary | ICD-10-CM

## 2022-07-28 PROBLEM — I21.3 ST ELEVATION (STEMI) MYOCARDIAL INFARCTION OF UNSPECIFIED SITE (HCC): Status: ACTIVE | Noted: 2022-07-28

## 2022-07-28 PROBLEM — I21.02 STEMI INVOLVING LEFT ANTERIOR DESCENDING CORONARY ARTERY (HCC): Status: ACTIVE | Noted: 2022-07-28

## 2022-07-28 PROCEDURE — 99496 TRANSJ CARE MGMT HIGH F2F 7D: CPT | Performed by: FAMILY MEDICINE

## 2022-07-28 NOTE — ASSESSMENT & PLAN NOTE
Hemoglobin A1c of 5 7 taking during recent hospital admission   Will avoid medications at this time due to new heavy medication burden & emphasize importance of eating healthy and commencing cardiac rehabilitation    - Will follow-up in 1 month considering medications

## 2022-07-28 NOTE — ASSESSMENT & PLAN NOTE
Un specified anterior STEMI with stenting x2 at [facility] on 07/19/2022  Echo during admission revealed EF of 20-25% with large LAD wall motion abnormality  Discharged last week, with plan for follow-up with cardiology next week  Commenced on dual antiplatelet with ticagrelor 90 mg twice daily and aspirin 81 mg daily which patient reports 100% compliance and understands the importance of continuing  Notes reviewed does report episodic post intervention AFib with RVR treated with amiodarone  On discharge patient was advised to commence cardiac rehab patient yet call  Strongly advised patient to commence, which he states he will call and arrange today  On exam today no evidence of distress  Normal heart sounds S1 plus S2 auscultated  Chest otherwise clear without signs of overload status  Right radial access site well healed  Holter in-situ  Patient has adequate supply of medications at home    - Will defer further intervention at this time pending cardiology appointment next week  - Patient aware he will require repeat labs having commenced Ace (previously booked by Cardiology team)  - Continues:  Aspirin 81mg qd  Brilinta 90mg qd     Metoprolol Succ 50mg qd  Losartan 25mg po qd  Atorvastatin 80mg HS

## 2022-07-28 NOTE — PROGRESS NOTES
Transition of Care  Follow-up After Hospitalization    Rafael Isabel 56 y o  male   Date:  7/28/2022    TCM Call     None      TCM Call     None        Hospital records were reviewed  Medications upon discharge reviewed/updated  Discharge Disposition:    Follow up visits with other specialists:     Assessment and Plan:  ST elevation (STEMI) myocardial infarction of unspecified site [facility])  Un specified anterior STEMI with stenting x2 at [facility] on 07/19/2022  Echo during admission revealed EF of 20-25% with large LAD wall motion abnormality  Discharged last week, with plan for follow-up with cardiology next week  Commenced on dual antiplatelet with ticagrelor 90 mg twice daily and aspirin 81 mg daily which patient reports 100% compliance and understands the importance of continuing  Notes reviewed does report episodic post intervention AFib with RVR treated with amiodarone  On discharge patient was advised to commence cardiac rehab patient yet call  Strongly advised patient to commence, which he states he will call and arrange today  On exam today no evidence of distress  Normal heart sounds S1 plus S2 auscultated  Chest otherwise clear without signs of overload status  Right radial access site well healed  Holter in-situ  Patient has adequate supply of medications at home    - Will defer further intervention at this time pending cardiology appointment next week  - Patient aware he will require repeat labs having commenced Ace (previously booked by Cardiology team)  - Continues:  Aspirin 81mg qd  Brilinta 90mg qd     Metoprolol Succ 50mg qd  Losartan 25mg po qd  Atorvastatin 80mg HS    Pre-diabetes  Hemoglobin A1c of 5 7 taking during recent hospital admission   Will avoid medications at this time due to new heavy medication burden & emphasize importance of eating healthy and commencing cardiac rehabilitation    - Will follow-up in 1 month considering medications      Major depressive disorder, recurrent severe without psychotic features [facility])  Denies SI/SH/AVH  States mood did take it when diagnosed with recent STEMI  Has been sleeping poorly and ruminating over diagnosis and rationale as to why he experienced a STEMI at age 56 without family history or lifestyle factors  Does speak optimistically and feels each day is better  Does have a therapist and physician has an appointment next week  - Continues home: DULoxetine 30 MG capsule  - Explained should symptoms persist or in the to worsen he should reach out sooner otherwise follow-up in clinic in [address] was seen today for new patient visit, back pain and neck pain  Diagnoses and all orders for this visit:    Major depressive disorder, recurrent severe without psychotic features ([facility] [address] )    ST elevation myocardial infarction (STEMI) involving other coronary artery of anterior wall ([facility] [address] )    Pre-diabetes        HPI:  32-year-old Arminda Shea was seen in clinic by me for the 1st time today  He had unfortunately [facility] having indured a STEMI requiring multiple stents to his LAD after experiencing chest pain and passing out whilst at work  Arminda Shea has been recovering at home with help from his wife and daughter  Denying symptoms bar lethargy on discharge and being off his food for the 1st few days, he is now moving around much better and left the house for the first time today  He denies any chest pain or discomfort including short of breath though has experiences some orthopnea since discharge particularly at night  He has not noted any leg swelling and is voiding/stooling and without concern  He has felt anxious since the event and found himself ruminating as to why he may have experienced a STEMI at his young age  His mood is now stabilizing he is able to concentrate more and reports being in better spirits    We spent time in size in the importance of medication compliance, I really counseled heavily on reaching out and establishing cardiac rehabilitation along with compliance with his cardiology follow-up  We discussed briefly his raised Hb A1c and I will follow this up in clinic after cardiology follow-up  No further concerns were related during this visit  ROS: Review of Systems   Constitutional: Negative for chills and fever  HENT: Negative for ear pain and sore throat  Eyes: Negative for pain and visual disturbance  Respiratory: Negative for cough and shortness of breath  Cardiovascular: Negative for chest pain, palpitations and leg swelling  Gastrointestinal: Negative for abdominal pain and vomiting  Genitourinary: Negative for dysuria and hematuria  Musculoskeletal: Negative for arthralgias and back pain  Skin: Negative for color change and rash  Neurological: Negative for seizures and syncope  All other systems reviewed and are negative  Past Medical History:   Diagnosis Date    Arthritis     Chronic pain disorder     low back and neck    Colon polyp     Hypertension     Intestinal polyp     Mild depression 01/31/2019    Other hyperlipidemia     Rectal bleeding     Right lumbar radiculopathy        Past Surgical History:   Procedure Laterality Date    CARDIAC SURGERY      COLONOSCOPY  07/06/2021    5 YEAR RECOMMENDED = HX OF TUBULAR    KNEE ARTHROSCOPY Left     knee    [place] COLONOSCOPY FLX DX W/COLLJ SPEC WHEN PFRMD N/A 05/02/2017    Procedure: COLONOSCOPY with polypectomies;  Surgeon: Gilles Carroll MD;  Location: [facility];   Service: General       Social History     Socioeconomic History    Marital status: Single     Spouse name: None    Number of children: 3    Years of education: 15    Highest education level: High school graduate   Occupational History    Occupation: unemployed   Tobacco Use    Smoking status: Never Smoker    Smokeless tobacco: Never Used   Vaping Use    Vaping Use: Never used   Substance and Sexual Activity    Alcohol use: Not Currently Comment: socially  On [place] drank 2 beers  He used to drink a lot in the past, about 25 years ago  he had numerous DUIs  has been to [place] [id number]   Drug use: Not Currently     Types: Marijuana     Comment: occasionally    Sexual activity: Yes     Partners: Female   Other Topics Concern    None   Social History Narrative    None     Social Determinants of Health     Financial Resource Strain: Not on file   Food Insecurity: Not on file   Transportation Needs: Not on file   Physical Activity: Not on file   Stress: Not on file   Social Connections: Not on file   Intimate Partner Violence: Not on file   Housing Stability: Not on file       Family History   Problem Relation Age of Onset    Cancer Mother     Depression Sister     Drug abuse Sister     Alcohol abuse Sister        Allergies   Allergen Reactions    Allegra [Fexofenadine] Other (See Comments)     depression         Current Outpatient Medications:     citalopram (CeleXA) 20 mg tablet, Take 1 5 tablets (30 mg total) by mouth daily, Disp: 135 tablet, Rfl: 2    QUEtiapine (SEROquel) 50 mg tablet, Take 1 tablet (50 mg total) by mouth daily at bedtime, Disp: 90 tablet, Rfl: 2      Physical Exam:  /60 (BP Location: Left arm, Patient Position: Sitting, Cuff Size: Adult)   Pulse 60   Temp (!) 97 4 °F (36 3 °C) (Temporal)   Resp 18   Ht 5' 9" (1 753 m)   Wt 73 kg (161 lb)   SpO2 99%   BMI 23 78 kg/m²     Physical Exam  Vitals and nursing note reviewed  Constitutional:       General: He is not in acute distress  Appearance: Normal appearance  He is not ill-appearing, toxic-appearing or diaphoretic  Comments: Alert oriented speaking in full sentences denying any pain/discomfort though endorsing some weakness which is resolving since discharge   HENT:      Head: Normocephalic and atraumatic        Right Ear: External ear normal       Left Ear: External ear normal       Nose: Nose normal       Mouth/Throat:      Mouth: Mucous membranes are moist       Pharynx: No oropharyngeal exudate or posterior oropharyngeal erythema  Eyes:      General: No scleral icterus  Right eye: No discharge  Left eye: No discharge  Conjunctiva/sclera: Conjunctivae normal    Cardiovascular:      Rate and Rhythm: Normal rate and regular rhythm  Pulses: Normal pulses  Heart sounds: Normal heart sounds  No murmur heard  Pulmonary:      Effort: Pulmonary effort is normal  No respiratory distress  Comments: Wearing Holter on the left side of upper torso  Abdominal:      General: Bowel sounds are normal       Palpations: Abdomen is soft  Tenderness: There is no abdominal tenderness  Musculoskeletal:         General: Normal range of motion  Cervical back: Normal range of motion and neck supple  Right lower leg: No edema  Left lower leg: No edema  Skin:     General: Skin is warm  Findings: No rash  Neurological:      General: No focal deficit present  Mental Status: He is alert and oriented to person, place, and time        Gait: Gait normal    Psychiatric:         Mood and Affect: Mood normal      Labs:  Lab Results   Component Value Date    WBC 11 64 (H) 12/05/2017    HGB 14 8 12/05/2017    HCT 43 5 12/05/2017    MCV 91 12/05/2017     12/05/2017     Lab Results   Component Value Date    K 3 9 12/05/2017     12/05/2017    CO2 29 12/05/2017    BUN 9 12/05/2017    CREATININE 0 81 12/05/2017    GLUF 103 (H) 12/05/2017    CALCIUM 9 4 12/05/2017    AST 13 12/05/2017    ALT 17 12/05/2017    ALKPHOS 50 12/05/2017    EGFR 110 12/05/2017

## 2022-07-28 NOTE — ASSESSMENT & PLAN NOTE
Denies SI/SH/AVH  States mood did take it when diagnosed with recent STEMI  Has been sleeping poorly and ruminating over diagnosis and rationale as to why he experienced a STEMI at age 56 without family history or lifestyle factors  Does speak optimistically and feels each day is better  Does have a therapist and physician has an appointment next week      - Continues home: DULoxetine 30 MG capsule  - Explained should symptoms persist or in the to worsen he should reach out sooner otherwise follow-up in clinic in 1 month

## 2022-08-11 ENCOUNTER — TELEPHONE (OUTPATIENT)
Dept: FAMILY MEDICINE CLINIC | Facility: CLINIC | Age: 47
End: 2022-08-11

## 2022-08-11 NOTE — TELEPHONE ENCOUNTER
IEB FORM RECEIVED ON 8/11/22  GIVEN TO GOSIA FARAH TO BE COMPLETED, SIGNED BY MD/ (INCLUDE LISC   NUMBER), AND FAXED BACK IN 3 DAYS

## 2022-08-16 ENCOUNTER — TELEPHONE (OUTPATIENT)
Dept: PSYCHIATRY | Facility: CLINIC | Age: 47
End: 2022-08-16

## 2022-08-16 ENCOUNTER — OFFICE VISIT (OUTPATIENT)
Dept: PSYCHIATRY | Facility: CLINIC | Age: 47
End: 2022-08-16
Payer: COMMERCIAL

## 2022-08-16 VITALS — WEIGHT: 160 LBS | HEIGHT: 69 IN | BODY MASS INDEX: 23.7 KG/M2

## 2022-08-16 DIAGNOSIS — F43.0 ACUTE STRESS DISORDER: Primary | ICD-10-CM

## 2022-08-16 DIAGNOSIS — F33.2 MAJOR DEPRESSIVE DISORDER, RECURRENT SEVERE WITHOUT PSYCHOTIC FEATURES (HCC): ICD-10-CM

## 2022-08-16 PROCEDURE — 90792 PSYCH DIAG EVAL W/MED SRVCS: CPT | Performed by: NURSE PRACTITIONER

## 2022-08-16 RX ORDER — CLONAZEPAM 0.5 MG/1
0.25 TABLET ORAL 2 TIMES DAILY
Qty: 30 TABLET | Refills: 0 | Status: SHIPPED | OUTPATIENT
Start: 2022-08-16 | End: 2022-09-13 | Stop reason: SDUPTHER

## 2022-08-16 RX ORDER — CLONAZEPAM 0.5 MG/1
0.5 TABLET ORAL 2 TIMES DAILY
Qty: 60 TABLET | Refills: 0 | Status: SHIPPED | OUTPATIENT
Start: 2022-08-16 | End: 2022-08-16 | Stop reason: DRUGHIGH
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
## 2022-08-16 NOTE — BH TREATMENT PLAN
TREATMENT PLAN (Medication Management Only)        [facility]    Name and Date of Birth:  Guicho Magana 56 y o  1975  Date of Treatment Plan: August 16, 2022  Diagnosis/Diagnoses:    1  Acute stress disorder    2  Major depressive disorder, recurrent severe without psychotic features [facility])      Strengths/Personal Resources for Self-Care: supportive family, supportive friends, taking medications as prescribed, ability to adapt to life changes, ability to communicate needs, ability to communicate well, ability to listen, ability to reason, average or above intelligence, family ties, general fund of knowledge, good understanding of illness, motivation for treatment, ability to negotiate basic needs, being resoureceful, strong cheikh, willingness to work on problems, work skills  Area/Areas of need (in own words): anxiety symptoms, depressive symptoms, sleep problems, lack of sleep  1  Long Term Goal: alleviate anxiety  Target Date:6 months - 2/16/2023  Person/Persons responsible for completion of goal: [facility]  2  Short Term Objective (s) - How will we reach this goal?:   A  Provider new recommended medication/dosage changes and/or continue medication(s): discontinue Cymbalta and Seroquel  Start Zoloft and Klonopin  B  Attend medication management appointments regularly  C  Attend psychotherapy regularly  Target Date:6 months - 2/16/2023  Person/Persons Responsible for Completion of Goal: Ramone  Progress Towards Goals: starting treatment  Treatment Modality: medication management every 4 weeks  Review due 180 days from date of this plan: 6 months - 2/16/2023  Expected length of service: ongoing treatment  My Physician/PA/NP and I have developed this plan together and I agree to work on the goals and objectives  I understand the treatment goals that were developed for my treatment

## 2022-08-16 NOTE — PATIENT INSTRUCTIONS
Zoloft (sertraline) take 1/2 tablet (25 mg) for 7 days, THEN take whole tablet (50 mg total) and continue    Please return for a follow up appointment as discussed  If you are running late or are unable to attend your appointment, please call our [place] office at [phone], or if you were seen in the [place] office, please call [phone]  If you have thoughts of harming yourself or are otherwise in psychological crisis, do not hesitate to contact your [facility] hotline, or go to the nearest emergency room    [facility], [place] Crisis: [address] Crisis: [phone]  [place] [address] Crisis: [address] Crisis: [address] Crisis: [address] Crisis: [address] [facility] Crisis: [phone]  National Suicide Prevention Hotline: 8-316.856.8983

## 2022-08-19 ENCOUNTER — TELEPHONE (OUTPATIENT)
Dept: PSYCHIATRY | Facility: CLINIC | Age: 47
End: 2022-08-19

## 2022-08-19 NOTE — TELEPHONE ENCOUNTER
Behavorial Health Outpatient Intake Questions    Referred by: Chloe Chaudhary     Please advised interviewee that they need to answer all questions truthfully to allow for best care and any misrepresentations of information may affect their ability to be seen at this clinic   => Was this discussed? Yes     Behavorial Health Outpatient Intake History -     Presenting Problem (in patient's words):   Depression anxiety , having some health issues that have him stressed     Are there any developmental disabilities? ? If yes, can they speak to you on the phone? If they are too limited to speak to you on phone, refer out No    Are you taking any psychiatric medications? Yes    => If yes, who prescribes? If yes, are they injectable medications? Klonopin, zoloft =Andree Longo     Does the patient have a language barrier or hearing impairment? No    Have you been treated at [facility] by a therapist or a doctor in the past? If yes, who? Yes  Chloe Chaudhary, Dr Bryanna Garner    Has the patient been hospitalized for mental health? No   If yes, how long ago was last hospitalization and where was it? Do you actively use alcohol or marijuana or illegal substances? If yes, what and how much - refer out to Drug and alcohol treatment if use is excessive or daily use of illegal substances No concerns of substance abuse are reported  Do you have a community treatment team or ? No    Legal History-     Does the patient have any history of arrests, alf/FPC time, or DUIs? Yes  If Yes-  1) What types of charges? 2) When were they last incarcerated? 3) Are they currently on parole or probation? Minor Child-    Who has custody of the child? Is there a custody agreement? If there is a custody agreement remind parent that they must bring a copy to the first appt or they will not be seen       Intake Team, please check with provider before scheduling if flags come up such as:  - complex case  - legal history (other than DUI)  - communication barrier concerns are present  - if, in your judgment, this needs further review    ACCEPTED as a patient Yes  => Appointment Date: 08/29/22 @ 3 pm     Referred Elsewhere? No    Name of Elliott Harris 32 #[id number] Phone #  If ins is primary or secondary  If patient is a minor, parents information such as Name, D  O B of guarantor

## 2022-08-29 ENCOUNTER — SOCIAL WORK (OUTPATIENT)
Dept: BEHAVIORAL/MENTAL HEALTH CLINIC | Facility: CLINIC | Age: 47
End: 2022-08-29
Payer: COMMERCIAL

## 2022-08-29 DIAGNOSIS — F41.1 GENERALIZED ANXIETY DISORDER: Primary | ICD-10-CM

## 2022-08-29 DIAGNOSIS — F33.2 MAJOR DEPRESSIVE DISORDER, RECURRENT SEVERE WITHOUT PSYCHOTIC FEATURES (HCC): ICD-10-CM

## 2022-08-29 PROCEDURE — 90791 PSYCH DIAGNOSTIC EVALUATION: CPT
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
## 2022-09-13 ENCOUNTER — TELEPHONE (OUTPATIENT)
Dept: PSYCHIATRY | Facility: CLINIC | Age: 47
End: 2022-09-13

## 2022-09-13 ENCOUNTER — TELEMEDICINE (OUTPATIENT)
Dept: PSYCHIATRY | Facility: CLINIC | Age: 47
End: 2022-09-13
Payer: COMMERCIAL

## 2022-09-13 DIAGNOSIS — F43.10 PTSD (POST-TRAUMATIC STRESS DISORDER): Primary | ICD-10-CM

## 2022-09-13 DIAGNOSIS — F33.2 MAJOR DEPRESSIVE DISORDER, RECURRENT SEVERE WITHOUT PSYCHOTIC FEATURES (HCC): ICD-10-CM

## 2022-09-13 PROCEDURE — 99214 OFFICE O/P EST MOD 30 MIN: CPT | Performed by: NURSE PRACTITIONER

## 2022-09-13 RX ORDER — SERTRALINE HYDROCHLORIDE 100 MG/1
100 TABLET, FILM COATED ORAL DAILY
Qty: 90 TABLET | Refills: 0 | Status: SHIPPED | OUTPATIENT
Start: 2022-09-13

## 2022-09-13 RX ORDER — CLONAZEPAM 0.5 MG/1
0.25 TABLET ORAL 2 TIMES DAILY
Qty: 30 TABLET | Refills: 0 | Status: SHIPPED | OUTPATIENT
Start: 2022-09-13 | End: 2022-10-24
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
## 2022-09-13 NOTE — TELEPHONE ENCOUNTER
Left voicemail to inform patient that appt for today 9/13/22 needed to be switched to a virtual visit or rescheduled due to provider not feeling well  Asked patient to call the office back to confirm which they prefer  Writer provided office number   Appt was changed to a virtual until patient returns office call

## 2022-09-14 ENCOUNTER — SOCIAL WORK (OUTPATIENT)
Dept: BEHAVIORAL/MENTAL HEALTH CLINIC | Facility: CLINIC | Age: 47
End: 2022-09-14
Payer: COMMERCIAL

## 2022-09-14 DIAGNOSIS — F41.1 GENERALIZED ANXIETY DISORDER: ICD-10-CM

## 2022-09-14 DIAGNOSIS — F33.2 MAJOR DEPRESSIVE DISORDER, RECURRENT SEVERE WITHOUT PSYCHOTIC FEATURES (HCC): Primary | ICD-10-CM

## 2022-09-14 PROCEDURE — 90834 PSYTX W PT 45 MINUTES: CPT

## 2022-09-29 ENCOUNTER — TELEPHONE (OUTPATIENT)
Dept: PSYCHIATRY | Facility: CLINIC | Age: 47
End: 2022-09-29

## 2022-09-29 ENCOUNTER — TELEPHONE (OUTPATIENT)
Dept: BEHAVIORAL/MENTAL HEALTH CLINIC | Facility: CLINIC | Age: 47
End: 2022-09-29

## 2022-09-29 NOTE — TELEPHONE ENCOUNTER
Pt called to cx the appt on 9/29/22 with Prince Perez due to pt ride never showed up, Thank you   Follow-up 10/17/22

## 2022-09-29 NOTE — TELEPHONE ENCOUNTER
After Office received a call from Seth Casillas, this worker called Seth Casillas  He confirmed that he is unable to make today's appointment due to transportation issues  We confirmed for next appointment 10/17 @ 3pm and set several future biweekly appointments  Brief conversation; Seth Casillas reported doing ok, had a f/up with doctor and due for a f/up procedure on 10/11 @ 6am; plans for f/up with Myriam Bennett medication management later that same day  We discussed the possibility of future virtual visits

## 2022-10-11 ENCOUNTER — TELEPHONE (OUTPATIENT)
Dept: PSYCHIATRY | Facility: CLINIC | Age: 47
End: 2022-10-11

## 2022-10-11 NOTE — TELEPHONE ENCOUNTER
Patient is calling regarding cancelling an appointment      Date/Time: 10/11/22 at 9:30a    Reason: Patient admitted into hospital    Patient was rescheduled: YES [x] NO []  If yes, when was Patient reschedule for: 10/24/22 at 9:30a    Patient requesting call back to reschedule: YES [] NO [x]

## 2022-10-17 ENCOUNTER — SOCIAL WORK (OUTPATIENT)
Dept: BEHAVIORAL/MENTAL HEALTH CLINIC | Facility: CLINIC | Age: 47
End: 2022-10-17
Payer: COMMERCIAL

## 2022-10-17 DIAGNOSIS — F41.1 GENERALIZED ANXIETY DISORDER: ICD-10-CM

## 2022-10-17 DIAGNOSIS — F33.2 MAJOR DEPRESSIVE DISORDER, RECURRENT SEVERE WITHOUT PSYCHOTIC FEATURES (HCC): Primary | ICD-10-CM

## 2022-10-17 PROCEDURE — 90834 PSYTX W PT 45 MINUTES: CPT

## 2022-10-17 NOTE — PSYCH
Visit Time    Visit Start Time: 3:01 PM  Visit Stop Time: 3:53 PM  Total Visit Duration: 52 minutesPsychotherapy Provided: Individual Psychotherapy 52 minutes     Length of time in session: 52 minutes, follow up in 2 weeks    Encounter Diagnosis     ICD-10-CM    1  Major depressive disorder, recurrent severe without psychotic features ([facility] [address] )  F33 2    2  Generalized anxiety disorder  F41 1        Goals addressed in session: Goal 1 - initiated treatment plan and will complete it via computer / signature next session  Overall goal is "to get as close as I can to normal"    D: Cara Lacie says he recently was dx'ed with a blood clot in his heart and found out at his ECHO  He says he was told this could possibly lead to having a stroke  He was in the hospital x 2 days and injected with medications and monitored  He says he has been on blood thinners since, and he injects himself in his belly  He says this has not been a problem for him  He says he also has to test his blood every couple of days and the readings go to his doctors for monitoring  He has a f/up in less than 2 weeks/another ECHO, but other procedures are put on hold  He admits to having lots of feelings since this latest medical issue  He admits to having thoughts of death, and that it is hard for him to plan the future or have discussions about the future  He says he realizes people don't really understand this, and he doesn't talk about it  He says he doesn't want to worry his family  He says he doesn't know how to talk with his older son, and he and his wife (not legally ) discuss how is best to speak with him so he doesn't take on too much burden  Cara Alisajeanette says his son already helps him lifting things, and this makes him feel badly  Cara Medellin also says he has attended his son's baseball games, though missed the last one while in the hospital, and that he is getting a lot of support from the families who know him and were told about what happened   He says he is in the PT with older men and they are surprised at how young he is and needing the heart treatment  He admits to saying 'why me' at times  Ramone acknowledges feeling anxious about everything he does, afraid of what might happen, especially because the heart attack came out of nowhere  Also, this blood clot was not known, and he wonders what could have happened    imagines where he might have been to have a stroke  He acknowledges considering writing a will and this is not easy to think about  With all of this, however, Jean Paul Garcia is readily able to say he continues to have hope  He does 'cheer' his older son at the games, but just not as loudly  He does spend time with his children and wife, and enjoys his younger son and his energy  He has changed his diet, which is a bummer in that no more fried foods, but is accepting of this change, and knows he needs to  He says he likes fish, and his wife makes it for him 2 x per week  We address his adjustments behaviorally and consider his internal struggle in allowing vulnerability with his family and friends  We discuss a potential treatment plan  He stated having difficulty knowing how to define goals  He says he wants to 'get as close as I can to normal', including 'accepting ways he needs to change behaviors' , 'deal with feelings', and 'allow himself to enjoy positive experiences with family and friends'  He is able to identify strengths as having a support group and willpower  A: Jean Paul Garcia continues to experience the stressors of life-threatening physical symptomology, and resulting anxiety  He is more readily able to address his thoughts and feelings, but continues to be reserved and hesitant at times  He appears to benefit from having a place to be vulnerable; he is struggling with the conflict of seeing himself differently than the strong individual for his wife and children  He denies SI/HI   Continue to provide environment where he can address thoughts and feelings he otherwise deems unacceptable, and continue to work toward allowing vulnerability with his family  P:   continue biweekly therapy          Current suicide risk : Amando Pack discusses keeping hope for feeling better secondary to his recent heart attack and other medical symptomotology, as well as the resulting lifestyle changes  He denies SI/HI  Behavioral Health Treatment Plan [facility]: Diagnosis and Treatment Plan explained to [address] relates understanding diagnosis and is agreeable to Treatment Plan  Yes - agreed to formalize treatment plan into computer at next session

## 2022-10-24 ENCOUNTER — OFFICE VISIT (OUTPATIENT)
Dept: PSYCHIATRY | Facility: CLINIC | Age: 47
End: 2022-10-24
Payer: COMMERCIAL

## 2022-10-24 DIAGNOSIS — G47.00 INSOMNIA, UNSPECIFIED TYPE: ICD-10-CM

## 2022-10-24 DIAGNOSIS — F33.2 MAJOR DEPRESSIVE DISORDER, RECURRENT SEVERE WITHOUT PSYCHOTIC FEATURES (HCC): ICD-10-CM

## 2022-10-24 DIAGNOSIS — F43.10 PTSD (POST-TRAUMATIC STRESS DISORDER): Primary | ICD-10-CM

## 2022-10-24 DIAGNOSIS — F41.8 ANXIETY ABOUT HEALTH: ICD-10-CM

## 2022-10-24 PROCEDURE — 99214 OFFICE O/P EST MOD 30 MIN: CPT | Performed by: NURSE PRACTITIONER

## 2022-10-24 RX ORDER — ATORVASTATIN CALCIUM 80 MG/1
80 TABLET, FILM COATED ORAL DAILY
COMMUNITY
Start: 2022-07-21 | End: 2023-07-21

## 2022-10-24 RX ORDER — CLOPIDOGREL BISULFATE 75 MG/1
75 TABLET ORAL DAILY
COMMUNITY
Start: 2022-10-14 | End: 2023-10-14

## 2022-10-24 RX ORDER — METOPROLOL SUCCINATE 50 MG/1
50 TABLET, EXTENDED RELEASE ORAL DAILY
COMMUNITY
Start: 2022-10-14 | End: 2023-10-14

## 2022-10-24 RX ORDER — CLONAZEPAM 0.5 MG
0.5 TABLET ORAL 2 TIMES DAILY
Qty: 60 TABLET | Refills: 0 | Status: SHIPPED | OUTPATIENT
Start: 2022-10-24

## 2022-10-24 RX ORDER — SACUBITRIL AND VALSARTAN 49; 51 MG/1; MG/1
1 TABLET, FILM COATED ORAL 2 TIMES DAILY
COMMUNITY
Start: 2022-09-19

## 2022-10-24 RX ORDER — WARFARIN SODIUM 5 MG/1
5 TABLET ORAL DAILY
COMMUNITY
Start: 2022-10-13 | End: 2023-10-13

## 2022-10-24 RX ORDER — SERTRALINE HYDROCHLORIDE 100 MG/1
150 TABLET, FILM COATED ORAL DAILY
COMMUNITY

## 2022-10-24 RX ORDER — ISOSORBIDE MONONITRATE 60 MG/1
60 TABLET, EXTENDED RELEASE ORAL DAILY
COMMUNITY
Start: 2022-10-14 | End: 2023-10-14
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
## 2022-11-10 ENCOUNTER — SOCIAL WORK (OUTPATIENT)
Dept: BEHAVIORAL/MENTAL HEALTH CLINIC | Facility: CLINIC | Age: 47
End: 2022-11-10

## 2022-11-10 DIAGNOSIS — F43.10 PTSD (POST-TRAUMATIC STRESS DISORDER): Primary | ICD-10-CM

## 2022-11-10 DIAGNOSIS — F33.2 MAJOR DEPRESSIVE DISORDER, RECURRENT SEVERE WITHOUT PSYCHOTIC FEATURES (HCC): ICD-10-CM

## 2022-11-10 DIAGNOSIS — F41.1 GENERALIZED ANXIETY DISORDER: ICD-10-CM

## 2022-11-10 NOTE — BH TREATMENT PLAN
Natalia Santiago  1975       Date of Initial Treatment Plan: 11/10/22   Date of Current Treatment Plan: 11/10/22    Treatment Plan Number 1     Strengths/Personal Resources for Self Care: willpower, support group    Diagnosis:   1  PTSD (post-traumatic stress disorder)     2  Generalized anxiety disorder     3  Major depressive disorder, recurrent severe without psychotic features ([facility] [address] )         Area of Needs: not being mad all the time; stop thinking so much, be kinder to myself      Long Term Goal 1: "get as close as I can to normal"    Target Date: 5/10/22  Completion Date: TBD/ongoing         Short Term Objectives for Goal 1: 1) continue to deal with feelings about physical limitations and changes; 2) continue to allow myself to enjoy positive experiences with family and friends; 3) be patient with myself through this life changing process      GOAL 1: Modality: Individual 2x per month   Completion Date TBD/ongoing and [address]: Diagnosis and Treatment Plan explained to [address] relates understanding diagnosis and is agreeable to Treatment Plan  Client Comments : Please share your thoughts, feelings, need and/or experiences regarding your treatment plan: no additional comments; discussed that we will review on or before 6 months

## 2022-11-10 NOTE — PSYCH
follow up in 2 weeks    Encounter Diagnosis     ICD-10-CM    1  PTSD (post-traumatic stress disorder)  F43 10    2  Generalized anxiety disorder  F41 1    3  Major depressive disorder, recurrent severe without psychotic features ([facility] [address] )  F33 2        Goals addressed in session: Goal 1     D: Completed treatment plan  Discussed upcoming pacemaker procedure and reasons dr  determined improvement not happening  Discussed his concerns over how the procedure will go and ultimate fear that he will not make it  He is able to think to the future, however, but recognizes these thoughts do come into his head  We discuss the normality of his anxiety in this regard  Discussed Ramone's concerns and fears and being in the process of acceptance of his condition  Discussed feelings about limits, and not being able to teach his younger son about wrestling and no longer being able to engage in high-action sports  He states coming to terms with his limits and realizing this is a process  He stated one positive that has come from all of this is that he realized life is too short and he doesn't have enough energy to stay angry with people  He stated how disagreemennts with his cousin and brother-in-law have been resolved as a result  Addressed ongoing goals to manage through this ongoing medical situation and accept the changes, while recognizing that he doesn't yet know his ultimate limits  A:  Recardo Erp presents as anxious, but rational, realistic and appropriately emotional regarding his medical issues  He continues to report good support system and to be hopeful, though scared, about his medical condition  Continue to monitor his overall daily coping and ADL management; continue to monitor for any worsening symptomotology / decrease in emotional functioning   No SI/HI/SIB    P:   continue biweekly therapy    Current suicide risk : Low     Recardo Erp is futuristic in thinking, goal-oriented, and addressing medical needs; he has awareness about mortality due to medical issues; no SI/HI/SIB    Behavioral Health Treatment Plan [facility]: Diagnosis and Treatment Plan explained to [address] relates understanding diagnosis and is agreeable to Treatment Plan   Yes     Visit start and stop times:    11/14/22  Start Time: 1500  Stop Time: 1552  Total Visit Time: 52 minutes

## 2022-11-15 ENCOUNTER — TELEPHONE (OUTPATIENT)
Dept: PSYCHIATRY | Facility: CLINIC | Age: 47
End: 2022-11-15

## 2022-11-15 NOTE — TELEPHONE ENCOUNTER
Medical record request was received from [place] [employer] for time frame of7/19/2021 to present   Will be placed in [address] to be completed by    Mallorie Lynch

## 2022-11-21 ENCOUNTER — TELEPHONE (OUTPATIENT)
Dept: PSYCHIATRY | Facility: CLINIC | Age: 47
End: 2022-11-21

## 2022-11-21 NOTE — TELEPHONE ENCOUNTER
Patient is calling regarding cancelling an appointment  Date/Time: 11/21/2022 at 3:00 p m  Reason: Still feel soar from a surgery    Patient was rescheduled: YES [] NO [x]  If yes, when was Patient reschedule for:     Patient requesting call back to reschedule: YES [] NO [x]    Pt has an appt schedule already

## 2022-11-23 ENCOUNTER — TELEPHONE (OUTPATIENT)
Dept: PSYCHIATRY | Facility: CLINIC | Age: 47
End: 2022-11-23

## 2022-12-01 ENCOUNTER — DOCUMENTATION (OUTPATIENT)
Dept: BEHAVIORAL/MENTAL HEALTH CLINIC | Facility: CLINIC | Age: 47
End: 2022-12-01
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
## 2022-12-07 ENCOUNTER — OFFICE VISIT (OUTPATIENT)
Dept: PSYCHIATRY | Facility: CLINIC | Age: 47
End: 2022-12-07

## 2022-12-07 DIAGNOSIS — F33.2 MAJOR DEPRESSIVE DISORDER, RECURRENT SEVERE WITHOUT PSYCHOTIC FEATURES (HCC): ICD-10-CM

## 2022-12-07 DIAGNOSIS — F43.10 PTSD (POST-TRAUMATIC STRESS DISORDER): Primary | ICD-10-CM

## 2022-12-07 DIAGNOSIS — F41.8 ANXIETY ABOUT HEALTH: ICD-10-CM

## 2022-12-07 PROBLEM — R45.89 ANXIETY ABOUT HEALTH: Status: ACTIVE | Noted: 2019-08-28

## 2022-12-07 RX ORDER — SERTRALINE HYDROCHLORIDE 25 MG/1
25 TABLET, FILM COATED ORAL DAILY
Qty: 90 TABLET | Refills: 1 | Status: SHIPPED | OUTPATIENT
Start: 2022-12-07

## 2022-12-07 RX ORDER — CLONAZEPAM 0.5 MG
0.5 TABLET ORAL 2 TIMES DAILY
Qty: 60 TABLET | Refills: 0 | Status: SHIPPED | OUTPATIENT
Start: 2022-12-07
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
## 2022-12-15 ENCOUNTER — TELEMEDICINE (OUTPATIENT)
Dept: BEHAVIORAL/MENTAL HEALTH CLINIC | Facility: CLINIC | Age: 47
End: 2022-12-15

## 2022-12-15 DIAGNOSIS — F43.10 PTSD (POST-TRAUMATIC STRESS DISORDER): Primary | ICD-10-CM

## 2022-12-15 NOTE — PSYCH
Virtual Regular Visit     Verification of patient location:     Patient is located in the following state in which I hold an active license [place]    Problem List Items Addressed This Visit        Other    PTSD (post-traumatic stress disorder) - Primary       This virtual visit started at 1:05 PM and ended at 1:57 PM     Reason for visit is scheduled virtual regular visit  Encounter provider Dani Mcintyre LCSW     Provider located at [address] [place] [id number] [phone]     Recent Visits  No visits were found meeting these conditions  Showing recent visits within past 7 days and meeting all other requirements  Today's Visits  Date Type Provider Dept   12/15/22 Telemedicine [address], [address] [place] [address] [facility]   Showing today's visits and meeting all other requirements  Future Appointments  No visits were found meeting these conditions  Showing future appointments within next 150 days and meeting all other requirements      HPI     Past Medical History:   Diagnosis Date   • Arthritis    • Chronic pain disorder     low back and neck   • Colon polyp    • Hypertension    • Intestinal polyp    • Mild depression 01/31/2019   • Other hyperlipidemia    • Rectal bleeding    • Right lumbar radiculopathy        Past Surgical History:   Procedure Laterality Date   • CARDIAC SURGERY     • COLONOSCOPY  07/06/2021    5 YEAR RECOMMENDED = HX OF TUBULAR   • KNEE ARTHROSCOPY Left     knee   • MI COLONOSCOPY FLX DX W/COLLJ SPEC WHEN PFRMD N/A 05/02/2017    Procedure: COLONOSCOPY with polypectomies;  Surgeon: Mckenzie Armstrong MD;  Location: [facility];   Service: General       Current Outpatient Medications   Medication Sig Dispense Refill   • atorvastatin (LIPITOR) 80 mg tablet Take 80 mg by mouth daily     • clopidogrel (PLAVIX) 75 mg tablet Take 75 mg by mouth daily     • Entresto 49-51 MG TABS Take 1 tablet by mouth 2 (two) times a day     • isosorbide mononitrate (IMDUR) 60 mg 24 hr tablet Take 60 mg by mouth daily     • KlonoPIN 0 5 MG tablet Take 1 tablet (0 5 mg total) by mouth 2 (two) times a day 60 tablet 0   • metoprolol succinate (TOPROL-XL) 50 mg 24 hr tablet Take 50 mg by mouth daily     • sertraline (ZOLOFT) 100 mg tablet Take 200 mg by mouth daily Take 2 tablets (200 mg total) in addition to 25 mg tablet for a total of 225 mg daily  • sertraline (Zoloft) 25 mg tablet Take 1 tablet (25 mg total) by mouth daily Take in addition to 200 mg (2-100 mg tablets) for a total of 225 mg daily  90 tablet 1   • warfarin (COUMADIN) 5 mg tablet Take 5 mg by mouth daily       No current facility-administered medications for this visit  Allergies   Allergen Reactions   • Allegra [Fexofenadine] Other (See Comments)     depression       The patient was identified by name and date of birth  Anamika Rosales was informed that this is a telemedicine visit and that the visit is being conducted through a virtual online media the [facility]  He agrees to proceed     My office door was closed  No one else was in the room  He acknowledged consent and understanding of privacy and security of the video platform  The patient has agreed to participate and understands they can discontinue the visit at any time  Patient is aware and confirmed that this virtual visit is a billable service  D: This therapist met with [facility] for a(n) Individual Therapy session  The session was changed to virtual platform due to inclement weather  Ramone stated managing with his pacemaker  He stated feeling somewhat better overall, but still has to take it easy, as per his doctor  He states frustration with this  He specifically is frustrated that he has to limit is left hand movement to not disrupt the pacemaker  He states that he is left-handed, so this is especially challenging  He states that he is doing it, nonetheless   He states that his doctors are telling him to keep the limits through January, and he is due for a followup visit, where they will review his progress and healing  We review multiple other areas of how Caren Marley is managing  He states attending a recent [employer] event and he was happy to be there, but sad that he had to hold back  We address his feelings and the process of accepting his limits, and the 'new normal'  He chuckles and says that he and his wife were just talking about the same thing yesterday  He says he was out with a friend, went to the wrestling and went to the store with his wife on same day  He acknowledges that he was 'feeling it', and how he also realizes he must pace himself     'the new normal' recognition  He states that he and his wife are doing well  She had to  a second job due to money, and he is in process of addressing longterm disability  Upon questioning, he acknowledges that he must consider the possibility of not being able to work again  Caren Marley discusses that his children are doing ok, his older son is still protective and monitoring to ensure he limits his physical output; his daughter is unjudgmental and 'glad he is alive', and he even had a talk with his younger son, letting him know how he has doctors helping him to feel better  We discuss the upcoming Christmas holiday, and several family members coming to their house  He acknowledges some difficulties in eating healthier foods and 'missing' some of the fried foods he used to love  We discuss the reality of how Ramone's medical issues have resulted in him experiencing losses of his physical limits and involvement, and social experiences  We address the process of acceptance for this change  We also discuss how Caren Marley recognizes that he is doing it, to the best he can  He acknowledges sad feelings, but also a desire to get better  He expresses understanding of future possibility for increased activity, but patience and time are a factor  A: Jacquie Blizzard was oriented x3  He was focused and engaged, and able to maintain humor and animation  He appropriately maintains hope for improvement, while going through a healthy process of acceptance of his medical condition and resulting limits  He continues to struggle with the possibility of working again, the degree to which he can participate peripherally in social and sporting events, and resulting acceptance of self  Ramone did not present with HI SI or SIB  Continue to provide supportive therapy  P: Ramone's next session is scheduled for 12/30/22 @1pm; confirmed at this time  Psychotherapy Provided: Individual Psychotherapy 52 minutes     Length of time in session: 52 minutes, follow up in 2 weeks    Goals addressed in session: Goal 1       Current suicide risk : [place] 27 is futuristic in thinking and goal-oriented    [address]: Diagnosis and Treatment Plan explained to [address] relates understanding diagnosis and is agreeable to Treatment Plan  Yes     Video Exam     There were no vitals filed for this visit  VIRTUAL VISIT [address] verbally agrees to participate in [employer]  Pt is aware that [employer] could be limited without vital signs or the ability to perform a full hands-on physical exam  Guicho Borne understands she or the provider may request at any time to terminate the video visit and request the patient to seek care or treatment in person      Luna Lara LCSW

## 2022-12-30 ENCOUNTER — SOCIAL WORK (OUTPATIENT)
Dept: BEHAVIORAL/MENTAL HEALTH CLINIC | Facility: CLINIC | Age: 47
End: 2022-12-30

## 2022-12-30 DIAGNOSIS — F41.8 ANXIETY ABOUT HEALTH: ICD-10-CM

## 2022-12-30 DIAGNOSIS — F43.10 PTSD (POST-TRAUMATIC STRESS DISORDER): Primary | ICD-10-CM
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
## 2023-02-07 ENCOUNTER — OFFICE VISIT (OUTPATIENT)
Dept: PSYCHIATRY | Facility: CLINIC | Age: 48
End: 2023-02-07

## 2023-02-07 DIAGNOSIS — F33.1 MODERATE EPISODE OF RECURRENT MAJOR DEPRESSIVE DISORDER (HCC): ICD-10-CM

## 2023-02-07 DIAGNOSIS — F41.8 ANXIETY ABOUT HEALTH: ICD-10-CM

## 2023-02-07 DIAGNOSIS — F43.10 PTSD (POST-TRAUMATIC STRESS DISORDER): Primary | ICD-10-CM

## 2023-02-07 RX ORDER — CLONAZEPAM 0.5 MG
0.5 TABLET ORAL DAILY PRN
Qty: 30 TABLET | Refills: 0 | Status: SHIPPED | OUTPATIENT
Start: 2023-02-07 | End: 2023-02-14 | Stop reason: DRUGHIGH
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
## 2023-02-14 ENCOUNTER — TELEPHONE (OUTPATIENT)
Dept: PSYCHIATRY | Facility: CLINIC | Age: 48
End: 2023-02-14

## 2023-02-14 DIAGNOSIS — F41.8 ANXIETY ABOUT HEALTH: Primary | ICD-10-CM

## 2023-02-14 RX ORDER — CLONAZEPAM 0.5 MG/1
0.5 TABLET ORAL DAILY PRN
Qty: 30 TABLET | Refills: 0 | Status: SHIPPED | OUTPATIENT
Start: 2023-02-14

## 2023-02-14 NOTE — TELEPHONE ENCOUNTER
Left a discreet message for Ilana Marin- Medication he requested a PA for was mistakenly sent as BRAND NAME and was re-sent for generic and should not require a PA  Encouraged to call pharmacy

## 2023-02-14 NOTE — TELEPHONE ENCOUNTER
Upon review of the chart it appears the medication is Klonopin 0 5 mg      Hira Coleman- can you please provide a rationale for PA submission as to why BRAND NAME is required? Thanks!

## 2023-03-09 ENCOUNTER — SOCIAL WORK (OUTPATIENT)
Dept: BEHAVIORAL/MENTAL HEALTH CLINIC | Facility: CLINIC | Age: 48
End: 2023-03-09

## 2023-03-09 DIAGNOSIS — F41.8 ANXIETY ABOUT HEALTH: Primary | ICD-10-CM

## 2023-03-09 DIAGNOSIS — F43.10 PTSD (POST-TRAUMATIC STRESS DISORDER): ICD-10-CM

## 2023-03-09 DIAGNOSIS — F33.1 MODERATE EPISODE OF RECURRENT MAJOR DEPRESSIVE DISORDER (HCC): ICD-10-CM

## 2023-03-09 NOTE — PSYCH
Behavioral Health Psychotherapy Progress Note    Psychotherapy Provided: Individual Psychotherapy     1  Anxiety about health        2  Moderate episode of recurrent major depressive disorder ([facility] [address] )        3  PTSD (post-traumatic stress disorder)            Goals addressed in session: Goal 1     DATA: Ramone discussed latest regarding medical issues, doctor updates, pending tests and recommendations; he stated now taking cholesterol shots due to not seeing improved results  He addressed ongoing distress at the limited indication of improvements as well as distress at not being able to be more physical, as this was a big part of his life  We addressed his thoughts and feelings  We addressed ways he is 'still a father' to his sons, and he was able to provide an example where his son was discouraged about something and he gave him words of encouragement  We addressed his thoughts regarding doctor recommendation at slowly increasing his physical activities and nervous about doing so  He reflected on his post-trauma from the heart attack event itself, due to it's suddenness  We discussed his awareness of wearing a medical necklace, and that he is not yet doing it  We addressed possible trip to [facility] in [place]  We discussed his concerns about flying, limitations, inability to eat food and be active versus possible enjoyment and time spent with family  He was encouraged to reach out to his doctor to help assess the medical aspects of flying and participating in this trip  We discussed options from an emotional standpoint of being with family  We addressed overall mood management  He stated having some nightmares and he described one, and we discussed briefly; he was encouraged to write down a future nightmare if possible and we could discuss it further  We scheduled followup appointment         During this session, this clinician used the following therapeutic modalities: Cognitive Behavioral Therapy and Supportive Psychotherapy    Substance Abuse was not addressed during this session  If the client is diagnosed with a co-occurring substance use disorder, please indicate any changes in the frequency or amount of use: n/a  Stage of change for addressing substance use diagnoses: No substance use/Not applicable    ASSESSMENT:  Eulogio Manning presents with a Dysthymic mood  his affect is Constricted, which is congruent, with his mood and the content of the session  The client has made progress on their goals  Miguel A Vasquez continues to be verbal and engaged in the therapy process  He is able to address his mix of feelings and loss due to his physical limits  He is processing his loss and the post trauma of his heart attack in June/July 2022  Eulogio Manning presents with a low risk of suicide, low risk of self-harm, and low risk of harm to others  Miguel A Vasquez is futuristic in thinking and goal-oriented; no SI/HI/SIB    For any risk assessment that surpasses a "low" rating, a safety plan must be developed  A safety plan was indicated: no  If yes, describe in detail n/a    PLAN: Between sessions, Eulogio Manning will consider ways he is 'still' a father to his children; continue to 'be present' at events and activities with his family; continue to process acceptance of medical issues  At the next session, the therapist will use Cognitive Behavioral Therapy and Supportive Psychotherapy to address processing medical issues and their impact on his physical health and the emotional impact of those limitations  Behavioral Health Treatment Plan and Discharge Planning: Eulogio Manning is aware of and agrees to continue to work on their treatment plan  They have identified and are working toward their discharge goals   yes    Visit start and stop times:    03/09/23  Start Time: 1000  Stop Time: 1052  Total Visit Time: 52 minutes

## 2023-03-15 ENCOUNTER — APPOINTMENT (EMERGENCY)
Dept: RADIOLOGY | Facility: HOSPITAL | Age: 48
End: 2023-03-15

## 2023-03-15 ENCOUNTER — HOSPITAL ENCOUNTER (EMERGENCY)
Facility: HOSPITAL | Age: 48
Discharge: HOME | End: 2023-03-15
Attending: EMERGENCY MEDICINE

## 2023-03-15 VITALS
TEMPERATURE: 98.3 F | HEART RATE: 62 BPM | BODY MASS INDEX: 24.86 KG/M2 | RESPIRATION RATE: 18 BRPM | DIASTOLIC BLOOD PRESSURE: 56 MMHG | WEIGHT: 168.43 LBS | OXYGEN SATURATION: 100 % | SYSTOLIC BLOOD PRESSURE: 102 MMHG

## 2023-03-15 DIAGNOSIS — M25.511 ACUTE PAIN OF RIGHT SHOULDER: ICD-10-CM

## 2023-03-15 DIAGNOSIS — M25.512 ACUTE PAIN OF LEFT SHOULDER: Primary | ICD-10-CM

## 2023-03-15 LAB
2HR DELTA HS TROPONIN: 1 NG/L
ANION GAP SERPL CALCULATED.3IONS-SCNC: 10 MMOL/L (ref 4–13)
ATRIAL RATE: 58 BPM
BASOPHILS # BLD AUTO: 0.06 THOUSANDS/ÂΜL (ref 0–0.1)
BASOPHILS NFR BLD AUTO: 1 % (ref 0–1)
BUN SERPL-MCNC: 15 MG/DL (ref 5–25)
CALCIUM SERPL-MCNC: 9.5 MG/DL (ref 8.4–10.2)
CARDIAC TROPONIN I PNL SERPL HS: 5 NG/L
CARDIAC TROPONIN I PNL SERPL HS: 6 NG/L
CHLORIDE SERPL-SCNC: 107 MMOL/L (ref 96–108)
CO2 SERPL-SCNC: 23 MMOL/L (ref 21–32)
CREAT SERPL-MCNC: 1.02 MG/DL (ref 0.6–1.3)
EOSINOPHIL # BLD AUTO: 0.31 THOUSAND/ÂΜL (ref 0–0.61)
EOSINOPHIL NFR BLD AUTO: 3 % (ref 0–6)
ERYTHROCYTE [DISTWIDTH] IN BLOOD BY AUTOMATED COUNT: 14.9 % (ref 11.6–15.1)
GFR SERPL CREATININE-BSD FRML MDRD: 87 ML/MIN/1.73SQ M
GLUCOSE SERPL-MCNC: 143 MG/DL (ref 65–140)
HCT VFR BLD AUTO: 40 % (ref 36.5–49.3)
HGB BLD-MCNC: 13.5 G/DL (ref 12–17)
IMM GRANULOCYTES # BLD AUTO: 0.04 THOUSAND/UL (ref 0–0.2)
IMM GRANULOCYTES NFR BLD AUTO: 0 % (ref 0–2)
LYMPHOCYTES # BLD AUTO: 4.21 THOUSANDS/ÂΜL (ref 0.6–4.47)
LYMPHOCYTES NFR BLD AUTO: 39 % (ref 14–44)
MCH RBC QN AUTO: 30.5 PG (ref 26.8–34.3)
MCHC RBC AUTO-ENTMCNC: 33.8 G/DL (ref 31.4–37.4)
MCV RBC AUTO: 91 FL (ref 82–98)
MONOCYTES # BLD AUTO: 0.63 THOUSAND/ÂΜL (ref 0.17–1.22)
MONOCYTES NFR BLD AUTO: 6 % (ref 4–12)
NEUTROPHILS # BLD AUTO: 5.58 THOUSANDS/ÂΜL (ref 1.85–7.62)
NEUTS SEG NFR BLD AUTO: 51 % (ref 43–75)
NRBC BLD AUTO-RTO: 0 /100 WBCS
P AXIS: 21 DEGREES
PLATELET # BLD AUTO: 280 THOUSANDS/UL (ref 149–390)
PMV BLD AUTO: 11.1 FL (ref 8.9–12.7)
POTASSIUM SERPL-SCNC: 3.6 MMOL/L (ref 3.5–5.3)
PR INTERVAL: 126 MS
QRS AXIS: 77 DEGREES
QRSD INTERVAL: 90 MS
QT INTERVAL: 420 MS
QTC INTERVAL: 412 MS
RBC # BLD AUTO: 4.42 MILLION/UL (ref 3.88–5.62)
SODIUM SERPL-SCNC: 140 MMOL/L (ref 135–147)
T WAVE AXIS: 89 DEGREES
VENTRICULAR RATE: 58 BPM
WBC # BLD AUTO: 10.83 THOUSAND/UL (ref 4.31–10.16)

## 2023-03-15 RX ORDER — LIDOCAINE 50 MG/G
2 PATCH TOPICAL ONCE
Status: DISCONTINUED | OUTPATIENT
Start: 2023-03-15 | End: 2023-03-16 | Stop reason: HOSPADM

## 2023-03-15 RX ORDER — ACETAMINOPHEN 325 MG/1
975 TABLET ORAL ONCE
Status: COMPLETED | OUTPATIENT
Start: 2023-03-15 | End: 2023-03-15

## 2023-03-15 RX ORDER — LIDOCAINE 40 MG/G
CREAM TOPICAL AS NEEDED
Qty: 30 G | Refills: 0 | Status: SHIPPED | OUTPATIENT
Start: 2023-03-15

## 2023-03-15 RX ORDER — METOPROLOL SUCCINATE 100 MG/1
100 TABLET, EXTENDED RELEASE ORAL DAILY
COMMUNITY
Start: 2023-02-15

## 2023-03-15 RX ADMIN — LIDOCAINE 5% 2 PATCH: 700 PATCH TOPICAL at 19:54

## 2023-03-15 RX ADMIN — ACETAMINOPHEN 325MG 975 MG: 325 TABLET ORAL at 19:54

## 2023-03-15 NOTE — ED PROVIDER NOTES
History  Chief Complaint   Patient presents with   • Shoulder Pain     Pt reports bilateral shoulder pain; R shoulder hurts from trying to prevent fall, states it felt like something tore  Left shoulder hurts, unsure if it is due to ICD placed 4 months ago  67-year-old male with a past medical history significant for arthritis, chronic back pain, MI, HTN, LV thrombus on Coumadin presenting to the ED with complaint of bilateral shoulder pain  Patient reports he began with right shoulder pain about 6 weeks ago, states he tripped and caught his fall by grabbing a street sign  States he began with pain immediate that time  He denies any deformity or hearing any sounds at that time  Reports the pain has been progressively worsening  States the pain is exacerbated with any shoulder movement  He denies any other right arm complaints  Also reports he has left shoulder pain which has been having for the last 4 weeks since he had his ICD placed  Reports he was in a shoulder sling for about 1 month after the procedure but he was moving his shoulder multiple times throughout the day while in the sling  Also denies any other LUE complaints today  Denies numbness, tingling, weakness or coolness in B/L UE  Reports he is otherwise been well and denies any other complaints today  Specifically denies fever, chills, cough, congestion, chest pain, shortness of breath, palpitations, diaphoresis, radiation of the pain to his neck/back/abdomen abdominal pain, N/V/D, urinary complaints, neck pain, headache, confusion, weakness and any other complaint  He denies any pain medications PTA  Denies any previous shoulder injuries or shoulder surgeries  Prior to Admission Medications   Prescriptions Last Dose Informant Patient Reported? Taking?    Entresto 49-51 MG TABS   Yes Yes   Sig: Take 1 tablet by mouth 2 (two) times a day   Evolocumab 140 MG/ML SOAJ   Yes Yes   Sig: Inject 140 mg under the skin every 14 (fourteen) days   atorvastatin (LIPITOR) 80 mg tablet   Yes Yes   Sig: Take 80 mg by mouth daily   clonazePAM (KlonoPIN) 0 5 mg tablet   No Yes   Sig: Take 1 tablet (0 5 mg total) by mouth daily as needed for anxiety   clopidogrel (PLAVIX) 75 mg tablet   Yes Yes   Sig: Take 75 mg by mouth daily   isosorbide mononitrate (IMDUR) 60 mg 24 hr tablet   Yes Yes   Sig: Take 60 mg by mouth daily   metoprolol succinate (TOPROL-XL) 100 mg 24 hr tablet   Yes Yes   Sig: Take 100 mg by mouth daily   metoprolol succinate (TOPROL-XL) 50 mg 24 hr tablet   Yes Yes   Sig: Take 50 mg by mouth daily   sertraline (ZOLOFT) 100 mg tablet   Yes Yes   Sig: Take 200 mg by mouth daily Take 2 tablets (200 mg total) in addition to 25 mg tablet for a total of 225 mg daily  sertraline (Zoloft) 25 mg tablet   No Yes   Sig: Take 1 tablet (25 mg total) by mouth daily Take in addition to 200 mg (2-100 mg tablets) for a total of 225 mg daily  warfarin (COUMADIN) 5 mg tablet   Yes Yes   Sig: Take 5 mg by mouth daily      Facility-Administered Medications: None       Past Medical History:   Diagnosis Date   • Arthritis    • Chronic pain disorder     low back and neck   • Colon polyp    • Heart attack [facility])    • Hypertension    • Intestinal polyp    • Mild depression 01/31/2019   • Other hyperlipidemia    • Rectal bleeding    • Right lumbar radiculopathy        Past Surgical History:   Procedure Laterality Date   • CARDIAC SURGERY     • COLONOSCOPY  07/06/2021    5 YEAR RECOMMENDED = HX OF TUBULAR   • KNEE ARTHROSCOPY Left     knee   • TX COLONOSCOPY FLX DX W/COLLJ SPEC WHEN PFRMD N/A 05/02/2017    Procedure: COLONOSCOPY with polypectomies;  Surgeon: Robbin Rollins MD;  Location: [facility]; Service: General       Family History   Problem Relation Age of Onset   • Cancer Mother    • Depression Sister    • Drug abuse Sister    • Alcohol abuse Sister      I have reviewed and agree with the history as documented      E-Cigarette/Vaping   • E-Cigarette Use Never User      E-Cigarette/Vaping Substances   • Nicotine No    • THC No    • CBD No    • Flavoring No    • Other No    • Unknown No      Social History     Tobacco Use   • Smoking status: Never   • Smokeless tobacco: Never   Vaping Use   • Vaping Use: Never used   Substance Use Topics   • Alcohol use: Not Currently     Comment: socially  On Marjorie drank 2 beers  He used to drink a lot in the past, about 25 years ago  he had numerous DUIs  has been to [place] [id number]  • Drug use: Not Currently     Types: Marijuana     Comment: occasionally       Review of Systems   Constitutional: Negative for chills and fever  HENT: Negative for ear pain and sore throat  Eyes: Negative for pain and visual disturbance  Respiratory: Negative for cough and shortness of breath  Cardiovascular: Negative for chest pain and palpitations  Gastrointestinal: Negative for abdominal pain, diarrhea, nausea and vomiting  Genitourinary: Negative for dysuria and hematuria  Musculoskeletal: Negative for arthralgias, back pain and neck pain  B/l shoulder pain   Skin: Negative for color change and rash  Neurological: Negative for seizures, syncope, weakness and headaches  Psychiatric/Behavioral: Negative for confusion  All other systems reviewed and are negative  Physical Exam  Physical Exam  Vitals and nursing note reviewed  Constitutional:       General: He is not in acute distress  Appearance: He is well-developed  Comments: Awake, alert, interactive and resting in the stretcher in no acute distress  Patient is not ill or toxic appearing  HENT:      Head: Normocephalic and atraumatic  Mouth/Throat:      Mouth: Mucous membranes are moist    Eyes:      Conjunctiva/sclera: Conjunctivae normal    Cardiovascular:      Rate and Rhythm: Normal rate and regular rhythm  Heart sounds: No murmur heard  Pulmonary:      Effort: Pulmonary effort is normal  No respiratory distress        Breath sounds: Normal breath sounds  Chest:      Comments: Scar over the left chest consistent with scar from ICD placement  Patient has mild associated TTP but there is no associated erythema, swelling, bruising, crepitus, or any other concerning findings  No other chest TTP  Abdominal:      Palpations: Abdomen is soft  Tenderness: There is no abdominal tenderness  Musculoskeletal:         General: No swelling  Cervical back: Neck supple  Comments: TTP over the anterior aspects of the shoulders bilaterally  No specific bony TTP B/L  No associated swelling, erythema, warmth, bruising, skin disruption, effusion or deformity  Patient does have limited PROM B/L 2/2 exacerbating the pain  B/l shoulder strength decreased 2/2 to exacerbating the pain otherwise strength 4/5 in b/l UE  Gwenette Carrier Sensation and motor otherwise intact in b/l UE  No specific TTP over the clavicles, scapula, humerus b/l or any other part of b/l UEs  No UE edema  B/l radial pulses 2+  No midline or paraspinal TTP over the C/T/L spine  Skin:     General: Skin is warm and dry  Capillary Refill: Capillary refill takes less than 2 seconds  Neurological:      General: No focal deficit present  Mental Status: He is alert  GCS: GCS eye subscore is 4  GCS verbal subscore is 5  GCS motor subscore is 6     Psychiatric:         Mood and Affect: Mood normal          Vital Signs  ED Triage Vitals   Temperature Pulse Respirations Blood Pressure SpO2   03/15/23 1909 03/15/23 1909 03/15/23 1909 03/15/23 1909 03/15/23 1909   98 3 °F (36 8 °C) 55 18 128/76 100 %      Temp Source Heart Rate Source Patient Position - Orthostatic VS BP Location FiO2 (%)   03/15/23 1909 03/15/23 1909 03/15/23 1909 03/15/23 1909 --   Oral Monitor Sitting Right arm       Pain Score       03/15/23 1954       10 - Worst Possible Pain           Vitals:    03/15/23 1909 03/15/23 2123   BP: 128/76 102/56   Pulse: 55 62   Patient Position - Orthostatic VS: Sitting Sitting Visual Acuity      ED Medications  Medications   lidocaine (LIDODERM) 5 % patch 2 patch (2 patches Topical Medication Applied 3/15/23 1954)   acetaminophen (TYLENOL) tablet 975 mg (975 mg Oral Given 3/15/23 1954)       Diagnostic Studies  Results Reviewed     Procedure Component Value Units Date/Time    HS Troponin I 2hr [[id number]]  (Normal) Collected: 03/15/23 2219    Lab Status: Final result Specimen: Blood from Arm, Right Updated: 03/15/23 2248     hs TnI 2hr 6 ng/L      Delta 2hr hsTnI 1 ng/L     HS Troponin 0hr (reflex protocol) [[id number]]  (Normal) Collected: 03/15/23 2004    Lab Status: Final result Specimen: Blood from Arm, Right Updated: 03/15/23 2104     hs TnI 0hr 5 ng/L     HS Troponin I 4hr [[id number]]     Lab Status: No result Specimen: Blood     Basic metabolic panel [[id number]]  (Abnormal) Collected: 03/15/23 2004    Lab Status: Final result Specimen: Blood from Arm, Right Updated: 03/15/23 2100     Sodium 140 mmol/L      Potassium 3 6 mmol/L      Chloride 107 mmol/L      CO2 23 mmol/L      ANION GAP 10 mmol/L      BUN 15 mg/dL      Creatinine 1 02 mg/dL      Glucose 143 mg/dL      Calcium 9 5 mg/dL      eGFR 87 ml/min/1 73sq m     Narrative:      Barry guidelines for Chronic Kidney Disease (CKD):   •  Stage 1 with normal or high GFR (GFR > 90 mL/min/1 73 square meters)  •  Stage 2 Mild CKD (GFR = 60-89 mL/min/1 73 square meters)  •  Stage 3A Moderate CKD (GFR = 45-59 mL/min/1 73 square meters)  •  Stage 3B Moderate CKD (GFR = 30-44 mL/min/1 73 square meters)  •  Stage 4 Severe CKD (GFR = 15-29 mL/min/1 73 square meters)  •  Stage 5 End Stage CKD (GFR <15 mL/min/1 73 square meters)  Note: GFR calculation is accurate only with a steady state creatinine    CBC and differential [[id number]]  (Abnormal) Collected: 03/15/23 2004    Lab Status: Final result Specimen: Blood from Arm, Right Updated: 03/15/23 2010     WBC 10 83 Thousand/uL      RBC 4 42 Million/uL Hemoglobin 13 5 g/dL      Hematocrit 40 0 %      MCV 91 fL      MCH 30 5 pg      MCHC 33 8 g/dL      RDW 14 9 %      MPV 11 1 fL      Platelets 054 Thousands/uL      nRBC 0 /100 WBCs      Neutrophils Relative 51 %      Immat GRANS % 0 %      Lymphocytes Relative 39 %      Monocytes Relative 6 %      Eosinophils Relative 3 %      Basophils Relative 1 %      Neutrophils Absolute 5 58 Thousands/µL      Immature Grans Absolute 0 04 Thousand/uL      Lymphocytes Absolute 4 21 Thousands/µL      Monocytes Absolute 0 63 Thousand/µL      Eosinophils Absolute 0 31 Thousand/µL      Basophils Absolute 0 06 Thousands/µL                  XR shoulder 2+ views LEFT   ED Interpretation by Juanito Mcfarland PA-C (03/15 2150)   ED wet read:  No acute osseous abnormality appreciated  XR shoulder 2+ views RIGHT   ED Interpretation by Juanito Mcfarland PA-C (03/15 2150)   ED wet read:  No acute osseous abnormality appreciated  XR chest 1 view portable   ED Interpretation by Juanito Mcfarland PA-C (03/15 2147)   ED wet read:   ICD appears in place, wire appears intact  No acute cardiopulmonary disease appreciated                    Procedures  ECG 12 Lead Documentation Only    Date/Time: 3/15/2023 8:17 PM  Performed by: Juanito Mcfarland PA-C  Authorized by: Juanito Mcfarland PA-C     Indications / Diagnosis:  L shoulder pain, hx of MI   ECG reviewed by me, the ED Provider: yes    Patient location:  ED  Rate:     ECG rate:  58    ECG rate assessment: bradycardic    Rhythm:     Rhythm: sinus bradycardia    Ectopy:     Ectopy: none    QRS:     QRS axis:  Normal    QRS intervals:  Normal  Conduction:     Conduction: normal    ST segments:     ST segments:  Non-specific (V2)  T waves:     T waves: inverted      Inverted:  I, aVL, V2 and V1  ECG 12 Lead Documentation Only    Date/Time: 3/15/2023 9:28 PM  Performed by: Juanito Mcfarland PA-C  Authorized by: Danny Fry PA-C     Indications / Diagnosis:  Repeat   ECG reviewed by me, the ED Provider: yes    Patient location:  ED  Quality:     Tracing quality:  Limited by artifact  Rate:     ECG rate:  61    ECG rate assessment: normal    Rhythm:     Rhythm: sinus rhythm    Ectopy:     Ectopy: none    QRS:     QRS axis:  Normal    QRS intervals:  Normal  Conduction:     Conduction: normal    ST segments:     ST segments:  Normal  T waves:     T waves: inverted      Inverted:  AVL, V2, V1 and aVR  ECG 12 Lead Documentation Only    Date/Time: 3/15/2023 11:19 PM  Performed by: Noris Fontana PA-C  Authorized by: Norsi Fontana PA-C     Indications / Diagnosis:  Delta trop  ECG reviewed by me, the ED Provider: yes    Patient location:  ED  Previous ECG:     Previous ECG:  Compared to current    Similarity:  No change  Rate:     ECG rate:  55    ECG rate assessment: bradycardic    Rhythm:     Rhythm: sinus bradycardia    Ectopy:     Ectopy: none    QRS:     QRS axis:  Normal    QRS intervals:  Normal  Conduction:     Conduction: normal    ST segments:     ST segments:  Normal  T waves:     T waves: inverted      Inverted:  I, aVL, V1 and V2             ED Course  ED Course as of 03/16/23 1643   Wed Mar 15, 2023   2124 WBC(!): 10 83   2124 hs TnI 0hr: 5  Will obtain a delta troponin  EKG showed a likely biphasic T wave in V2 and inverted T waves in AVL, V1, and V2  No previous for comparison  2150 No acute findings appreciated on shoulder x-rays, pending official read  2150 No acute cardiopulmonary disease appreciated on CXR  Defibrillator appears in place  Defibrillator wire appears intact  Pending official read  2151 Heart score- 4  Will place cardiology referral    2316 Delta 2hr hsTnI: 1  No chnages on EKG from previous  Heart score- 4  Will place ambulatory referral to cardiology although pt follows up with cardiology through [facility], advised to contact his cardiologist as well and follow-up as soon as reasonably possible    Advised to continue acetaminophen and lidocaine cream as needed for pain control  Advised Lidoderm patches need to removed in 12 hours  Advised to follow-up with orthopedics the contact number provided for reevaluation and further management  Strict return precautions verbally communicated to the patient as outlined in the AVS   All patient questions and concerns were answered  Patient verbally communicated their understanding and agreement to the above plan  Patient stable at discharge  Portions of the record may have been created with voice recognition software  Occasional wrong word or "sound a like" substitutions may have occurred due to the inherent limitations of voice recognition software  Read the chart carefully and recognize, using context, where substitutions have occurred  HEART Risk Score    Flowsheet Row Most Recent Value   Heart Score Risk Calculator    History 0 Filed at: 03/15/2023 2151   ECG 1 Filed at: 03/15/2023 2151   Age 1 Filed at: 03/15/2023 2151   Risk Factors 2 Filed at: 03/15/2023 2151   Troponin 0 Filed at: 03/15/2023 2151   HEART Score 4 Filed at: 03/15/2023 2151                                      Medical Decision Making  80-year-old male presenting to the ED with a complaint of bilateral shoulder pain  Right shoulder pain has been present for about 6 weeks since he caught himself on a street sign after tripping  Denies any deformity or hearing any sounds at that time but the pain has been progressively worsening since  Also reports left shoulder pain since his ICD placed about 4 weeks ago, states that he was in a sling for about 1 month after the procedure but he was moving his shoulder multiple times throughout the day while he was in the sling  He did not take any pain medications PTA  He denies any other UE complaints  Denies associated chest pain, shortness of breath, palpitations, back pain, abdominal pain and any other complaint    Reports he is otherwise been well   Exam patient is a well-appearing 50-year-old male resting in stretcher no acute distress  He does have TTP over the anterior aspect of B/L shoulders however no other specific shoulder pain  He does have decreased PROM 2/2 exacerbating the pain  No other concerning findings specifically including no erythema, warmth, swelling, bruising, deformity, crepitus, effusion or any other concerning findings  No other TTP over the B/L UE, back or chest   No other concerning findings on PE   VSS  DDx including but not limited to: tendinitis, bursitis, arthritis, rotator cuff injury, contusion, strain, sprain  Doubt: fracture, dislocation, cardiac etiology, septic arthritis, DVT, arterial occlusion, brachial plexus impingement, radiculopathy  This appears to be likely musculoskeletal in nature however with patient's significant cardiac history will obtain an EKG and troponin to rule out cardiac etiology  We will obtain a CXR for cardiac work-up and to ensure ICD leads appear intact and in place  Will also obtain baseline labs of cbc and bmp in light of cardiac workup  Will treat symptomatically with acetaminophen and lidocaine patches  Disposition likely home with referral to orthopedics and PCP/cardiology follow-up however will reevaluate after labs and imaging  Acute pain of left shoulder: acute illness or injury  Acute pain of right shoulder: acute illness or injury      Disposition  Final diagnoses:   None     ED Disposition     None      Follow-up Information    None         Patient's Medications   Discharge Prescriptions    No medications on file       No discharge procedures on file      PDMP Review       Value Time User    PDMP Reviewed  Yes 2/7/2023  9:52 AM Rad Benitez, [address]          ED Provider  Electronically Signed by           Meena Casey PA-C  03/16/23 2510

## 2023-03-16 LAB
ATRIAL RATE: 55 BPM
ATRIAL RATE: 72 BPM
P AXIS: 27 DEGREES
PR INTERVAL: 144 MS
QRS AXIS: 102 DEGREES
QRS AXIS: 126 DEGREES
QRSD INTERVAL: 84 MS
QRSD INTERVAL: 90 MS
QT INTERVAL: 408 MS
QT INTERVAL: 432 MS
QTC INTERVAL: 410 MS
QTC INTERVAL: 413 MS
T WAVE AXIS: 91 DEGREES
T WAVE AXIS: 94 DEGREES
VENTRICULAR RATE: 55 BPM
VENTRICULAR RATE: 61 BPM

## 2023-03-16 NOTE — DISCHARGE INSTRUCTIONS
Please return to the ED for any concerns as outlined in the AVS or for any other concerns  Please follow-up with your primary care provider, cardiologist and orthopedics the contact number provided in 2 days for re-evaluation and further management  Continue acetaminophen and lidocaine cream as needed for pain control  Lidoderm patches need to be removed in 12 hours  Consider rest and ice as needed for symptomatic control  Ice on for 20 minutes and off for 20 minutes

## 2023-03-23 ENCOUNTER — SOCIAL WORK (OUTPATIENT)
Dept: BEHAVIORAL/MENTAL HEALTH CLINIC | Facility: CLINIC | Age: 48
End: 2023-03-23

## 2023-03-23 DIAGNOSIS — F43.10 PTSD (POST-TRAUMATIC STRESS DISORDER): ICD-10-CM

## 2023-03-23 DIAGNOSIS — F41.8 ANXIETY ABOUT HEALTH: Primary | ICD-10-CM

## 2023-03-23 NOTE — PSYCH
Behavioral Health Psychotherapy Progress Note    Psychotherapy Provided: Individual Psychotherapy     1  Anxiety about health        2  PTSD (post-traumatic stress disorder)            Goals addressed in session: Goal 1     DATA: Cas Thompson stated he needs to leave a few minutes early to be home to get delivery of injection supplies  We monitored the time accordingly  Ramone discussed ongoing management of physical health and navigating ways he has to alter his life, and his feelings in so doing  He stated his daughter continues to help prepare meals and get paid to assist with medical needs (including injections)  Ramone discussed his son's baseball endeavors and doing very well in the sport  Cas Thompson speaks with energy and pride in how his son is doing  He discussed arm pain and going to the ED to get X-rays and having to go through a bunch of heart-related tests and the frustration of it  He stated having an upcoming f/up chest echo in early May to determine if his blood clot has decreased  He stated ongoing plans to go with family to [address] [facility] in mid May  We discussed ways he will have to attend to his limits and how he is feeling at the time, as well as nutritional needs  He discussed a bit about his outgoing, energetic, social uncle and a previous visit, and looking forward to seeing him  We addressed Ramone's increasing ability to recognize the reality of his limits and he provided an example of how he sees himself 'getting better' at accepting his limits  He was also able to acknowledge that he maintains hope that some things may be different in the future, but for now he needs to attend to his limits as they are now  He discussed wanting to talk about the possibility of his mortality to his wife and kids, but they push the topic off  We discussed his own awareness and ways he may ease his mind, such as preparing a will, and he acknowledged he has thought about doing it    We discussed that Cas Thompson is in a process of establishing and balancing realistic caution and limit-setting versus maintaining constant unhealthy and debilitating anxiety and worry or avoiding things out of fear  He acknowledged it is taking time to figure out that balance  He states things going well with Susan Petersen and taking medications steadily and they are helping  He discussed ways he is helping kids with their sporting, and this provider attempted to help [facility] recognize how he is reinventing himself in some ways, and is helping people in different ways than before  We confirmed next appointment and set another one in April  We discussed waiting to schedule any May appointments due to his pending schedule  During this session, this clinician used the following therapeutic modalities: Cognitive Behavioral Therapy, Mindfulness-based Strategies, Motivational Interviewing, Solution-Focused Therapy and Supportive Psychotherapy    Substance Abuse was not addressed during this session  If the client is diagnosed with a co-occurring substance use disorder, please indicate any changes in the frequency or amount of use: n/a  Stage of change for addressing substance use diagnoses: No substance use/Not applicable    ASSESSMENT:  Omaira Walter presents with a Euthymic/ normal mood  his affect is Normal range and intensity, which is congruent, with his mood and the content of the session  The client has made progress on their goals  [facility] presented in good spirits today, notably becoming more energetic when discussing his son, his uncle, and ways he has connected and helped others  He is able to verbalize internal progress in managing the post -trauma and ongoing management of his medical issues  Omaira Walter presents with a low risk of suicide, low risk of self-harm, and low risk of harm to others  For any risk assessment that surpasses a "low" rating, a safety plan must be developed      A safety plan was indicated: no  If yes, describe in detail n/a    PLAN: Between sessions, Alexis Blum will continue to manage medical limits and continue to recognize self-worth in a different capacity  At the next session, the therapist will use Cognitive Behavioral Therapy, Mindfulness-based Strategies, Motivational Interviewing, Solution-Focused Therapy and Supportive Psychotherapy to address ongoing progress in managing medical limits while recognizing his self-worth in a different capacity  Behavioral Health Treatment Plan and Discharge Planning: Alexis Blum is aware of and agrees to continue to work on their treatment plan  They have identified and are working toward their discharge goals   yes    Visit start and stop times:    03/23/23  Start Time: 1000  Stop Time: 6025  Total Visit Time: 47 minutes

## 2023-04-06 ENCOUNTER — SOCIAL WORK (OUTPATIENT)
Dept: BEHAVIORAL/MENTAL HEALTH CLINIC | Facility: CLINIC | Age: 48
End: 2023-04-06

## 2023-04-06 DIAGNOSIS — F41.8 ANXIETY ABOUT HEALTH: ICD-10-CM

## 2023-04-06 DIAGNOSIS — F43.10 PTSD (POST-TRAUMATIC STRESS DISORDER): Primary | ICD-10-CM

## 2023-04-06 DIAGNOSIS — F33.1 MODERATE EPISODE OF RECURRENT MAJOR DEPRESSIVE DISORDER (HCC): ICD-10-CM

## 2023-04-06 NOTE — PSYCH
Behavioral Health Psychotherapy Progress Note    Psychotherapy Provided: Individual Psychotherapy     1  PTSD (post-traumatic stress disorder)        2  Anxiety about health        3  Moderate episode of recurrent major depressive disorder ([address] )            Goals addressed in session: Goal 1     DATA: Ramone discussed recent sports happenings with his older son  He addressed the excitement of his son's baseball endeavors, including the circumstances of a  recent team loss  We discussed Ramone's recognition of the 'mental game' in sports and how he knows his son needs to accept loss, teamsmanship, and staying grounded  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, successes, 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, and even with his physical limits  Anabel acknowledges ongoing support from former sports colleagues, parents, and this is helpful  He acknowledges still feeling upset that he cannot get into the physicality of wrestling and sports and still struggles with these limits  He states, however, that he has decided not to go to [place] with his family due to needing to f/up with his medical tests and feeling this is the best decision at this time  We addressed his feelings  Ramone discussed nightmares of dying and we processed  We addressed the reality of health concerns and the notion of keeping balance  We addressed the significance of how he is engaging where possible, listening to his body, following medical directives, and continuing to involve himself socially in his areas of interest  We discussed wife taking on a second job, and how that is going, and Anabel states everyone is pitching in to help, she is ok, and this is working out at this time  We confirm next appointment  Due to Ramone's pending medical appointments and that family will be in [facility] in May, we discuss plan to wait until next session before scheduling additional sessions       During this session, this clinician used the following therapeutic "modalities: Cognitive Behavioral Therapy, Mindfulness-based Strategies, Motivational Interviewing and Supportive Psychotherapy    Substance Abuse was not addressed during this session  If the client is diagnosed with a co-occurring substance use disorder, please indicate any changes in the frequency or amount of use: n/a  Stage of change for addressing substance use diagnoses: No substance use/Not applicable    ASSESSMENT:  Frank Newsome presents with a Euthymic/ normal mood  his affect is Normal range and intensity, which is congruent, with his mood and the content of the session  The client has made progress on their goals  Hina Anand continues to use therapeutic process to address thoughts and feelings  Frank Newsome presents with a low risk of suicide, low risk of self-harm, and low risk of harm to others  No SI/HI/SIB    For any risk assessment that surpasses a \"low\" rating, a safety plan must be developed  A safety plan was indicated: no  If yes, describe in detail n/a    PLAN: Between sessions, Frank Newsome will continue to address health management with recognition of ways  At the next session, the therapist will use Cognitive Behavioral Therapy and Supportive Psychotherapy to address overall mood management and coping  Behavioral Health Treatment Plan and Discharge Planning: Frank Newsome is aware of and agrees to continue to work on their treatment plan  They have identified and are working toward their discharge goals   yes    Visit start and stop times:    04/06/23  Start Time: 1000  Stop Time: 1052  Total Visit Time: 52 minutes  "

## 2023-04-25 ENCOUNTER — TELEPHONE (OUTPATIENT)
Dept: PSYCHIATRY | Facility: CLINIC | Age: 48
End: 2023-04-25

## 2023-04-25 NOTE — TELEPHONE ENCOUNTER
Medical record request was received from [employer], [place] of Disability Determination, for the time frame of 2/7/2023 to present   Will be placed in [address] by the end of the day to be signed by:  Thierno Dunn

## 2023-04-26 ENCOUNTER — SOCIAL WORK (OUTPATIENT)
Dept: BEHAVIORAL/MENTAL HEALTH CLINIC | Facility: CLINIC | Age: 48
End: 2023-04-26

## 2023-04-26 DIAGNOSIS — F43.10 PTSD (POST-TRAUMATIC STRESS DISORDER): ICD-10-CM

## 2023-04-26 DIAGNOSIS — F41.8 ANXIETY ABOUT HEALTH: Primary | ICD-10-CM

## 2023-04-26 NOTE — PSYCH
Behavioral Health Psychotherapy Progress Note    Psychotherapy Provided: Individual Psychotherapy     1  Anxiety about health        2  PTSD (post-traumatic stress disorder)            Goals addressed in session: Goal 1     DATA: Ramone stated overall doing ok, appetite is good, sleep still not great, but less experiencing of nightmares  He does report the pain in shoulder is dx'ed as tendonitis, which was notable today relating to weather conditions  Ramone reports going to baseball games and enjoying outtings and watching his son thrive  He joked about his son having a small fan base at the games and how others notice his skills  Tiki Rivera discusses how it is important for his son to do well in school, and stay grounded  He says his son is interested in being a pro player, but how he also is very good with animals and may want to become a   Ramone discusses feeling that his decision to stay home while family goes to [place] for 2 weeks is the correct decision  He reports having his echo on May 7 and his daughter is paid caregiver and will stay home as well  He reports very slow improvement in his stamina when walking, and is accepting 'slowly but surely'  We discuss his overall acceptance of his limits and progress he has made in self-management and awareness and attention to his physical well being  He states still feeling badly about his not doing enough financially, but recognizes he is not ready to embark on work  He states communicating with his wife about things, and she is still doing ok in her second job, and he is doing things around the house, at his pace, and what he can do  He was encouraged to continue ongoing communication and check-ins with his wife on how this is going, and how they are both doing  We set additional appointments in and around family trip, psychiatric f/up, and echo       During this session, this clinician used the following therapeutic modalities: Cognitive Behavioral Therapy and "Supportive Psychotherapy    Substance Abuse was not addressed during this session  If the client is diagnosed with a co-occurring substance use disorder, please indicate any changes in the frequency or amount of use: n/a  Stage of change for addressing substance use diagnoses: No substance use/Not applicable    ASSESSMENT:  Sam Youngblood presents with a Euthymic/ normal mood  his affect is Normal range and intensity, which is congruent, with his mood and the content of the session  The client has made progress on their goals  Eduard Amor demonstrates ongoing acceptance of his limitations and understanding alternative ways to still fill his role as father and   He presents overall with improved mood and management  Sam Youngblood presents with a low risk of suicide, low risk of self-harm, and low risk of harm to others  No SI/HI/SIB, Eduard Learn is vested in staying healthy and is goal-oriented and futuristic in thinking  For any risk assessment that surpasses a \"low\" rating, a safety plan must be developed  A safety plan was indicated: no  If yes, describe in detail n/a    PLAN: Between sessions, Sam Youngblood will continue attention to self-care  At the next session, the therapist will use Cognitive Behavioral Therapy and Supportive Psychotherapy to address mood management, continued management of medical issues  Behavioral Health Treatment Plan and Discharge Planning: Sam Youngblood is aware of and agrees to continue to work on their treatment plan  They have identified and are working toward their discharge goals   yes    Visit start and stop times:    04/26/23  Start Time: 1005  Stop Time: 1057  Total Visit Time: 52 minutes  "

## 2023-06-05 ENCOUNTER — SOCIAL WORK (OUTPATIENT)
Dept: BEHAVIORAL/MENTAL HEALTH CLINIC | Facility: CLINIC | Age: 48
End: 2023-06-05
Payer: COMMERCIAL

## 2023-06-05 DIAGNOSIS — F41.8 ANXIETY ABOUT HEALTH: Primary | ICD-10-CM

## 2023-06-05 DIAGNOSIS — F33.1 MODERATE EPISODE OF RECURRENT MAJOR DEPRESSIVE DISORDER (HCC): ICD-10-CM

## 2023-06-05 DIAGNOSIS — F43.10 PTSD (POST-TRAUMATIC STRESS DISORDER): ICD-10-CM

## 2023-06-05 PROCEDURE — 90834 PSYTX W PT 45 MINUTES: CPT

## 2023-06-05 NOTE — PSYCH
Behavioral Health Psychotherapy Progress Note    Psychotherapy Provided: Individual Psychotherapy     1  Anxiety about health        2  Moderate episode of recurrent major depressive disorder ([address] )        3  PTSD (post-traumatic stress disorder)            Goals addressed in session: Goal 1     DATA: Ramone started by saying he needs to end session early as he is heading to a disability evaluation after this appointment  We agreed and watched and ensured session ended by his requested time  He stated canceling recent appointments with provider/psychiatry related to medical testing  Ramone provided update on how he has been doing  He discussed family vacation to [place], which he did not attend for health reasons  He stated doing ok during this 2 week period and his daughter stayed home as well and cared for him as needed  He stated happily about the childrens' experience, discussed his uncle, and how they all enjoyed the time  He showed a brief video of [place] and his younger son enjoying the beach  Ramone stated activities he did and overall felt well  He noted having a f/up echo, and one occasion of a questionable 'passing out', possible seizure, when he was with his friends  He stated that he has already informed his doctor and is going to be addressed in an upcoming visit  He noted heart functioning increase, but stubborn cholesterol, all being addressed  We discussed an overall philosophy, as his year anniversary approaches from the heart attack  He reflectively noted feeling grateful for being at work and people around to help get him to the hospital  He reported overall managing, but at times says 'why me'  He noted not knowing birth father may be a missing link to his genetic component to his heart issues  (says he was very young and apparently just left, and reportedly  a few years ago, but never met him)  Ramone discussed ongoing difficulty falling asleep and having periodic nightmares   He noted thinking about his "health, worry about not waking up  We addressed sleep hygiene, and discussed possible writing down stressful thoughts prior to sleeping or reading, to which he notes things like watching or reading about sports relaxes him and does help  We addressed and reviewed his progress, having ebbs and flows of worry, mixed in with acceptance of losses and adaptive changes to his routine, and ongoing management of unknown and ongoing aspect of his medical needs  He remains hopeful and able to enjoy moments with his family and friends      We set an additional appointment into July, which he stated is a suitable time frame; he noted also needing to schedule again for psychiatry, which he said he plans to do  During this session, this clinician used the following therapeutic modalities: Cognitive Behavioral Therapy, Mindfulness-based Strategies and Supportive Psychotherapy    Substance Abuse was not addressed during this session  If the client is diagnosed with a co-occurring substance use disorder, please indicate any changes in the frequency or amount of use: n/a  Stage of change for addressing substance use diagnoses: No substance use/Not applicable    ASSESSMENT:  Kelly Velez presents with a Euthymic/ normal mood  his affect is Normal range and intensity, which is congruent, with his mood and the content of the session  The client has made progress on their goals  Teri Jernigan continues to demonstrate stability and adjustment and acceptance of medical issues, with still some symptomology with improvement from post trauma  Kelly Velez presents with a low risk of suicide, low risk of self-harm, and low risk of harm to others  For any risk assessment that surpasses a \"low\" rating, a safety plan must be developed  A safety plan was indicated: no  If yes, describe in detail n/a    PLAN: Between sessions, Kelly Velez will continue self-care and enjoyment with family; ongoing acceptance of medical limits   At the " next session, the therapist will use Cognitive Behavioral Therapy, Mindfulness-based Strategies and Supportive Psychotherapy to address mood management, coping strategies for post trauma  Behavioral Health Treatment Plan and Discharge Planning: Lashay Must is aware of and agrees to continue to work on their treatment plan  They have identified and are working toward their discharge goals   yes    Visit start and stop times:    06/05/23  Start Time: 0900  Stop Time: 0943  Total Visit Time: 43 minutes

## 2023-06-19 ENCOUNTER — TELEPHONE (OUTPATIENT)
Dept: PSYCHIATRY | Facility: CLINIC | Age: 48
End: 2023-06-19

## 2023-06-19 NOTE — TELEPHONE ENCOUNTER
Patient contacted the office to schedule a follow up visit with provider  Patient is now scheduled for 6/23/2023  at 2 pm in Office

## 2023-06-23 ENCOUNTER — OFFICE VISIT (OUTPATIENT)
Dept: PSYCHIATRY | Facility: CLINIC | Age: 48
End: 2023-06-23

## 2023-06-23 DIAGNOSIS — Z91.199 NO-SHOW FOR APPOINTMENT: Primary | ICD-10-CM

## 2023-06-23 NOTE — PSYCH
No Call  No Show  No Charge    [facility] no showed 06/23/23 appointment , staff called and left message to reschedule appointment     Treatment Plan not due at this session

## 2023-06-26 ENCOUNTER — TELEPHONE (OUTPATIENT)
Dept: PSYCHIATRY | Facility: CLINIC | Age: 48
End: 2023-06-26

## 2023-06-26 NOTE — LETTER
23     Shailesh Petit   : 1975   [address] [id number]       It is the policy of [facility] to monitor and manage appointments that have been no-showed or cancelled with less than 48-hour notice  This is necessary to ensure that we are able to provide timely access for all patients to our providers  Undue numbers of unutilized appointments delays necessary medical care for all patients  Dear Shailesh Petit : We are sorry that you missed your appointment with LANA Luna on 2023  It has been 5 months  since your last appointment  Your health and follow-up care are important to us  We want to make you aware that you do not have another appointment with LANA Luna scheduled  If you have already rescheduled this appointment, please disregard  Please be aware that our office policy states that if you 'no show' two Medication Management  appointments in a row without prior notice of cancellation, or three or more in a calendar year, you may be discharged from Medication Management  treatment  We want to bring this to your attention now to prevent an interruption of your care  If you have any questions about this policy, please call us at the number above  If we do not hear from you within 10 business days to make a follow up appointment, we will assume you are no longer interested in care here  Thank you in advance for your cooperation and assistance         Sincerely,      [address] [address]

## 2023-07-07 ENCOUNTER — TELEPHONE (OUTPATIENT)
Dept: BEHAVIORAL/MENTAL HEALTH CLINIC | Facility: CLINIC | Age: 48
End: 2023-07-07

## 2023-07-07 NOTE — TELEPHONE ENCOUNTER
Call to Mr. Matthew Belcher. STEPHANI on VM. This is a first no show/no call for Mr. Matthew Belcher. Provider left message regarding today's missed appointment and to advise that we do not have any followups scheduled at this time. Mr. Matthew Belcher was encouraged that he may call the main MR line or provider's direct number if he wishes to schedule additional appointments.

## 2023-07-10 ENCOUNTER — TELEPHONE (OUTPATIENT)
Dept: PSYCHIATRY | Facility: CLINIC | Age: 48
End: 2023-07-10

## 2023-09-19 ENCOUNTER — TELEPHONE (OUTPATIENT)
Dept: PSYCHIATRY | Facility: CLINIC | Age: 48
End: 2023-09-19

## 2023-10-23 ENCOUNTER — DOCUMENTATION (OUTPATIENT)
Dept: BEHAVIORAL/MENTAL HEALTH CLINIC | Facility: CLINIC | Age: 48
End: 2023-10-23

## 2023-10-23 NOTE — PROGRESS NOTES
Psychotherapy Discharge Summary    Preferred Name: Armond Shea  YOB: 1975    Admission date to psychotherapy: 8/29/22    Referred by: LANA Bolden / self    Presenting Problem: dealing with life changes and emotional post-trauma secondary to experiencing an acute medical condition    Course of treatment included : individual therapy     Progress/Outcome of Treatment Goals (brief summary of course of treatment) Mr. Jolynn Alexis attended individual therapy from September 2022 through June 2023, with ongoing, though, intermittent scheduling. Treatment Complications (if any): n/a    Treatment Progress: good. Mr. Jolynn Alexis presented and reported improvement in overall management. Current [facility] Psychiatric Provider: unclear from records if he is still active in treatment    Discharge Medications include: n/a    Discharge Date: 10/23/23    Discharge Diagnosis: anxiety about health; PTSD; Moderate episode of recurrent major depressive disorder    Criteria for Discharge:  Mr. Jolynn Alexis was a no-show for July session, a response was not received to provider phone call outreach, no-show letter, nor letter of intent (9/19/23). Mr. Jolynn Alexis has not scheduled a followup session with provider.       Aftercare recommendations include (include specific referral names and phone numbers, if appropriate): resume therapy and continue psychiatric support/medication monitoring    Prognosis: good

## 2023-10-24 ENCOUNTER — TELEPHONE (OUTPATIENT)
Dept: PSYCHIATRY | Facility: CLINIC | Age: 48
End: 2023-10-24

## 2023-10-26 NOTE — TELEPHONE ENCOUNTER
DISCHARGE LETTER for Sukh Guallpa LCSW (certified and regular) placed in outgoing mail on 10/26/23.     Article #:  [id number]    Address:  [address] [place] [id number]

## 2025-03-20 ENCOUNTER — TELEPHONE (OUTPATIENT)
Dept: FAMILY MEDICINE CLINIC | Facility: CLINIC | Age: 50
End: 2025-03-20

## (undated) DEVICE — SINGLE-USE POLYPECTOMY SNARE: Brand: ROTATABLE SNARE